# Patient Record
Sex: MALE | Race: WHITE | Employment: OTHER | URBAN - METROPOLITAN AREA
[De-identification: names, ages, dates, MRNs, and addresses within clinical notes are randomized per-mention and may not be internally consistent; named-entity substitution may affect disease eponyms.]

---

## 2021-03-27 ENCOUNTER — IMMUNIZATION (OUTPATIENT)
Dept: PRIMARY CARE CLINIC | Age: 51
End: 2021-03-27

## 2021-03-27 PROCEDURE — 0011A COVID-19, MODERNA VACCINE 100MCG/0.5ML DOSE: CPT | Performed by: PHYSICIAN ASSISTANT

## 2021-03-27 PROCEDURE — 91301 COVID-19, MODERNA VACCINE 100MCG/0.5ML DOSE: CPT | Performed by: PHYSICIAN ASSISTANT

## 2021-05-03 ENCOUNTER — IMMUNIZATION (OUTPATIENT)
Dept: PRIMARY CARE CLINIC | Age: 51
End: 2021-05-03

## 2021-05-03 PROCEDURE — 91301 COVID-19, MODERNA VACCINE 100MCG/0.5ML DOSE: CPT | Performed by: PHYSICIAN ASSISTANT

## 2021-05-03 PROCEDURE — 0012A COVID-19, MODERNA VACCINE 100MCG/0.5ML DOSE: CPT | Performed by: PHYSICIAN ASSISTANT

## 2022-04-26 ENCOUNTER — OFFICE VISIT (OUTPATIENT)
Dept: PRIMARY CARE CLINIC | Age: 52
End: 2022-04-26
Payer: COMMERCIAL

## 2022-04-26 VITALS
HEART RATE: 63 BPM | WEIGHT: 268 LBS | SYSTOLIC BLOOD PRESSURE: 122 MMHG | TEMPERATURE: 98.6 F | HEIGHT: 78 IN | DIASTOLIC BLOOD PRESSURE: 64 MMHG | OXYGEN SATURATION: 97 % | BODY MASS INDEX: 31.01 KG/M2

## 2022-04-26 DIAGNOSIS — F41.1 GAD (GENERALIZED ANXIETY DISORDER): ICD-10-CM

## 2022-04-26 DIAGNOSIS — Z12.11 SCREENING FOR COLON CANCER: ICD-10-CM

## 2022-04-26 DIAGNOSIS — E11.610 CHARCOT FOOT DUE TO DIABETES MELLITUS (HCC): Primary | ICD-10-CM

## 2022-04-26 DIAGNOSIS — E11.42 TYPE 2 DIABETES MELLITUS WITH DIABETIC POLYNEUROPATHY, WITH LONG-TERM CURRENT USE OF INSULIN (HCC): ICD-10-CM

## 2022-04-26 DIAGNOSIS — Z79.899 HIGH RISK MEDICATION USE: ICD-10-CM

## 2022-04-26 DIAGNOSIS — S98.112A AMPUTATION OF LEFT GREAT TOE (HCC): ICD-10-CM

## 2022-04-26 DIAGNOSIS — K64.4 HEMORRHOIDS, EXTERNAL WITHOUT COMPLICATIONS: ICD-10-CM

## 2022-04-26 DIAGNOSIS — Z79.4 TYPE 2 DIABETES MELLITUS WITH DIABETIC PERIPHERAL ANGIOPATHY WITHOUT GANGRENE, WITH LONG-TERM CURRENT USE OF INSULIN (HCC): ICD-10-CM

## 2022-04-26 DIAGNOSIS — Z86.31 PERSONAL HISTORY OF DIABETIC FOOT ULCER: ICD-10-CM

## 2022-04-26 DIAGNOSIS — Z79.4 TYPE 2 DIABETES MELLITUS WITH DIABETIC POLYNEUROPATHY, WITH LONG-TERM CURRENT USE OF INSULIN (HCC): ICD-10-CM

## 2022-04-26 DIAGNOSIS — R06.09 DOE (DYSPNEA ON EXERTION): ICD-10-CM

## 2022-04-26 DIAGNOSIS — Z12.5 SCREENING FOR PROSTATE CANCER: ICD-10-CM

## 2022-04-26 DIAGNOSIS — Z95.1 HISTORY OF FOUR VESSEL CORONARY ARTERY BYPASS GRAFT: ICD-10-CM

## 2022-04-26 DIAGNOSIS — E55.9 VITAMIN D DEFICIENCY: ICD-10-CM

## 2022-04-26 DIAGNOSIS — S98.131A AMPUTATION OF FIFTH TOE OF RIGHT FOOT (HCC): ICD-10-CM

## 2022-04-26 DIAGNOSIS — E11.51 TYPE 2 DIABETES MELLITUS WITH DIABETIC PERIPHERAL ANGIOPATHY WITHOUT GANGRENE, WITH LONG-TERM CURRENT USE OF INSULIN (HCC): ICD-10-CM

## 2022-04-26 DIAGNOSIS — N52.9 ERECTILE DYSFUNCTION, UNSPECIFIED ERECTILE DYSFUNCTION TYPE: ICD-10-CM

## 2022-04-26 PROBLEM — E11.621 TYPE 2 DIABETES MELLITUS WITH FOOT ULCER, WITH LONG-TERM CURRENT USE OF INSULIN (HCC): Status: ACTIVE | Noted: 2022-04-26

## 2022-04-26 PROBLEM — E11.621 TYPE 2 DIABETES MELLITUS WITH FOOT ULCER, WITH LONG-TERM CURRENT USE OF INSULIN (HCC): Status: RESOLVED | Noted: 2022-04-26 | Resolved: 2022-04-26

## 2022-04-26 PROBLEM — L97.509 TYPE 2 DIABETES MELLITUS WITH FOOT ULCER, WITH LONG-TERM CURRENT USE OF INSULIN (HCC): Status: RESOLVED | Noted: 2022-04-26 | Resolved: 2022-04-26

## 2022-04-26 PROBLEM — L97.509 TYPE 2 DIABETES MELLITUS WITH FOOT ULCER, WITH LONG-TERM CURRENT USE OF INSULIN (HCC): Status: ACTIVE | Noted: 2022-04-26

## 2022-04-26 LAB
ALBUMIN SERPL-MCNC: 5 G/DL (ref 3.5–5.2)
ALP BLD-CCNC: 96 U/L (ref 40–129)
ALT SERPL-CCNC: 26 U/L (ref 0–40)
ANION GAP SERPL CALCULATED.3IONS-SCNC: 16 MMOL/L (ref 7–16)
AST SERPL-CCNC: 22 U/L (ref 0–39)
BILIRUB SERPL-MCNC: 0.5 MG/DL (ref 0–1.2)
BUN BLDV-MCNC: 23 MG/DL (ref 6–20)
CALCIUM SERPL-MCNC: 10 MG/DL (ref 8.6–10.2)
CHLORIDE BLD-SCNC: 94 MMOL/L (ref 98–107)
CHOLESTEROL, TOTAL: 190 MG/DL (ref 0–199)
CO2: 24 MMOL/L (ref 22–29)
CREAT SERPL-MCNC: 1 MG/DL (ref 0.7–1.2)
CREATININE URINE: 31 MG/DL (ref 40–278)
GFR AFRICAN AMERICAN: >60
GFR NON-AFRICAN AMERICAN: >60 ML/MIN/1.73
GLUCOSE BLD-MCNC: 262 MG/DL (ref 74–99)
HBA1C MFR BLD: 10.2 %
HCT VFR BLD CALC: 46.7 % (ref 37–54)
HDLC SERPL-MCNC: 49 MG/DL
HEMOGLOBIN: 14.9 G/DL (ref 12.5–16.5)
LDL CHOLESTEROL CALCULATED: 114 MG/DL (ref 0–99)
MCH RBC QN AUTO: 27.8 PG (ref 26–35)
MCHC RBC AUTO-ENTMCNC: 31.9 % (ref 32–34.5)
MCV RBC AUTO: 87.1 FL (ref 80–99.9)
MICROALBUMIN UR-MCNC: <12 MG/L
MICROALBUMIN/CREAT UR-RTO: ABNORMAL (ref 0–30)
PDW BLD-RTO: 13.6 FL (ref 11.5–15)
PLATELET # BLD: 190 E9/L (ref 130–450)
PMV BLD AUTO: 10.8 FL (ref 7–12)
POTASSIUM SERPL-SCNC: 5 MMOL/L (ref 3.5–5)
PRO-BNP: 221 PG/ML (ref 0–125)
PROSTATE SPECIFIC ANTIGEN: 0.57 NG/ML (ref 0–4)
RBC # BLD: 5.36 E12/L (ref 3.8–5.8)
SODIUM BLD-SCNC: 134 MMOL/L (ref 132–146)
TOTAL PROTEIN: 8.4 G/DL (ref 6.4–8.3)
TRIGL SERPL-MCNC: 133 MG/DL (ref 0–149)
VITAMIN B-12: 492 PG/ML (ref 211–946)
VITAMIN D 25-HYDROXY: 44 NG/ML (ref 30–100)
VLDLC SERPL CALC-MCNC: 27 MG/DL
WBC # BLD: 4.2 E9/L (ref 4.5–11.5)

## 2022-04-26 PROCEDURE — G8427 DOCREV CUR MEDS BY ELIG CLIN: HCPCS | Performed by: FAMILY MEDICINE

## 2022-04-26 PROCEDURE — G8417 CALC BMI ABV UP PARAM F/U: HCPCS | Performed by: FAMILY MEDICINE

## 2022-04-26 PROCEDURE — 4004F PT TOBACCO SCREEN RCVD TLK: CPT | Performed by: FAMILY MEDICINE

## 2022-04-26 PROCEDURE — 83036 HEMOGLOBIN GLYCOSYLATED A1C: CPT | Performed by: FAMILY MEDICINE

## 2022-04-26 PROCEDURE — 3017F COLORECTAL CA SCREEN DOC REV: CPT | Performed by: FAMILY MEDICINE

## 2022-04-26 PROCEDURE — 2022F DILAT RTA XM EVC RTNOPTHY: CPT | Performed by: FAMILY MEDICINE

## 2022-04-26 PROCEDURE — 99204 OFFICE O/P NEW MOD 45 MIN: CPT | Performed by: FAMILY MEDICINE

## 2022-04-26 PROCEDURE — 3046F HEMOGLOBIN A1C LEVEL >9.0%: CPT | Performed by: FAMILY MEDICINE

## 2022-04-26 RX ORDER — LISINOPRIL 10 MG/1
TABLET ORAL
Qty: 90 TABLET | Refills: 1 | Status: SHIPPED
Start: 2022-04-26 | End: 2022-07-08 | Stop reason: SDUPTHER

## 2022-04-26 RX ORDER — EMPAGLIFLOZIN 25 MG/1
TABLET, FILM COATED ORAL
Qty: 90 TABLET | Refills: 1 | Status: SHIPPED
Start: 2022-04-26 | End: 2022-07-14 | Stop reason: SDUPTHER

## 2022-04-26 RX ORDER — FUROSEMIDE 20 MG/1
20 TABLET ORAL DAILY
Qty: 90 TABLET | Refills: 1 | Status: ON HOLD
Start: 2022-04-26 | End: 2022-08-05 | Stop reason: HOSPADM

## 2022-04-26 RX ORDER — MULTIVIT WITH MINERALS/LUTEIN
1000 TABLET ORAL DAILY
COMMUNITY

## 2022-04-26 RX ORDER — LISINOPRIL 10 MG/1
TABLET ORAL
COMMUNITY
Start: 2021-03-19 | End: 2022-04-26 | Stop reason: SDUPTHER

## 2022-04-26 RX ORDER — BUSPIRONE HYDROCHLORIDE 15 MG/1
TABLET ORAL
Qty: 180 TABLET | Refills: 1 | Status: SHIPPED
Start: 2022-04-26 | End: 2022-10-13 | Stop reason: SDUPTHER

## 2022-04-26 RX ORDER — SEMAGLUTIDE 1.34 MG/ML
INJECTION, SOLUTION SUBCUTANEOUS
COMMUNITY
Start: 2022-02-12 | End: 2022-04-26 | Stop reason: SDUPTHER

## 2022-04-26 RX ORDER — INSULIN GLARGINE 100 [IU]/ML
60 INJECTION, SOLUTION SUBCUTANEOUS DAILY
COMMUNITY
Start: 2021-01-28 | End: 2022-04-26 | Stop reason: SDUPTHER

## 2022-04-26 RX ORDER — CITALOPRAM 40 MG/1
TABLET ORAL
COMMUNITY
Start: 2022-02-10 | End: 2022-05-12 | Stop reason: ALTCHOICE

## 2022-04-26 RX ORDER — ASPIRIN 81 MG/1
162 TABLET ORAL DAILY
COMMUNITY

## 2022-04-26 RX ORDER — FUROSEMIDE 40 MG/1
TABLET ORAL
COMMUNITY
Start: 2022-02-10 | End: 2022-04-26 | Stop reason: SDUPTHER

## 2022-04-26 RX ORDER — METOPROLOL SUCCINATE 25 MG/1
TABLET, EXTENDED RELEASE ORAL
Qty: 90 TABLET | Refills: 1 | Status: SHIPPED
Start: 2022-04-26 | End: 2022-07-08 | Stop reason: SDUPTHER

## 2022-04-26 RX ORDER — BLOOD SUGAR DIAGNOSTIC
STRIP MISCELLANEOUS
Qty: 100 EACH | Refills: 1 | Status: SHIPPED
Start: 2022-04-26 | End: 2022-06-28

## 2022-04-26 RX ORDER — EMPAGLIFLOZIN 25 MG/1
TABLET, FILM COATED ORAL
COMMUNITY
Start: 2022-02-12 | End: 2022-04-26 | Stop reason: SDUPTHER

## 2022-04-26 RX ORDER — SEMAGLUTIDE 1.34 MG/ML
INJECTION, SOLUTION SUBCUTANEOUS
Qty: 4.5 ML | Refills: 1 | Status: SHIPPED
Start: 2022-04-26 | End: 2022-07-14 | Stop reason: SDUPTHER

## 2022-04-26 RX ORDER — GABAPENTIN 300 MG/1
300 CAPSULE ORAL 3 TIMES DAILY
Qty: 270 CAPSULE | Refills: 0 | Status: SHIPPED
Start: 2022-04-26 | End: 2022-10-13 | Stop reason: SDUPTHER

## 2022-04-26 RX ORDER — GABAPENTIN 300 MG/1
300 CAPSULE ORAL 3 TIMES DAILY
COMMUNITY
Start: 2022-04-22 | End: 2022-04-26 | Stop reason: SDUPTHER

## 2022-04-26 RX ORDER — BLOOD SUGAR DIAGNOSTIC
STRIP MISCELLANEOUS
COMMUNITY
Start: 2022-03-30

## 2022-04-26 RX ORDER — CITALOPRAM 10 MG/1
10 TABLET ORAL DAILY
Qty: 30 TABLET | Refills: 0 | Status: ON HOLD
Start: 2022-04-26 | End: 2022-08-03

## 2022-04-26 RX ORDER — INSULIN GLARGINE 100 [IU]/ML
60 INJECTION, SOLUTION SUBCUTANEOUS DAILY
Qty: 54 ML | Refills: 0
Start: 2022-04-26 | End: 2022-07-08 | Stop reason: SDUPTHER

## 2022-04-26 RX ORDER — TADALAFIL 20 MG/1
20 TABLET ORAL DAILY PRN
Qty: 30 TABLET | Refills: 1 | Status: SHIPPED | OUTPATIENT
Start: 2022-04-26

## 2022-04-26 RX ORDER — BUSPIRONE HYDROCHLORIDE 15 MG/1
TABLET ORAL
COMMUNITY
Start: 2022-04-07 | End: 2022-04-26 | Stop reason: SDUPTHER

## 2022-04-26 RX ORDER — METOPROLOL SUCCINATE 25 MG/1
TABLET, EXTENDED RELEASE ORAL
COMMUNITY
Start: 2022-02-10 | End: 2022-04-26 | Stop reason: SDUPTHER

## 2022-04-26 ASSESSMENT — PATIENT HEALTH QUESTIONNAIRE - PHQ9
2. FEELING DOWN, DEPRESSED OR HOPELESS: 0
1. LITTLE INTEREST OR PLEASURE IN DOING THINGS: 0
SUM OF ALL RESPONSES TO PHQ QUESTIONS 1-9: 0
SUM OF ALL RESPONSES TO PHQ9 QUESTIONS 1 & 2: 0
SUM OF ALL RESPONSES TO PHQ QUESTIONS 1-9: 0

## 2022-04-26 NOTE — PROGRESS NOTES
[facility]   1970    Chief Complaint:     Chief Complaint   Patient presents with   Sorto Establish Care     concern for poss hemorrhoids and weight loss     HPI   To establish  DM2  Taking medications as prescribed. Has not been compliant with recommended diet and exercise. Also taking ASA, ACEi/ARB. Not taking statin. Eye exam current (within one year): yes  Weight trend: stable  Ever attended diabetic education? yes  Compliance with diet:good   Compliance with medication: good   Home blood glucose monitoring? Frequent, CGM  Home blood sugar records: trend: stable  Any episodes of hypoglycemia? no  Proper foot care? yes  Diabetic complications: peripheral neuropathy, cardiovascular disease and peripheral vascular disease  Lab Results   Component Value Date    CHOL 190 2022    TRIG 133 2022    HDL 49 2022    LDLCALC 114 2022    LABVLDL 27 2022      Lab Results   Component Value Date/Time    MALBCR - (AA) 2022 12:00 PM   Hx amputation R 5th toe and L great toe. Follows w Podiatry and needs local referral. Charcot foot. Slow weight loss since dietary changes and starting SGLT2 and GLP1. No constitutional symptoms. Due for cancer screenings. Hx 4 vessel CABG. Needs current labs and to start back on statin. No CP, palps, tightness, orthopnea, PND. Has some exertional dyspnea. In reviewing Cardiology [facility] from [facility] he was to begin Cardiac Rehab on moving here a year ago. Having recent difficulty maintaining erections. Tried Viagra 100mg but no effect. Would likt to trial another medication. Reviewing meds, problem may have started since starting Celexa. Has lump lateral to anus. States bowel movements are normal. No bleeding. Due for colon cancer screening as well.      Need records from PCP, Endo, Podiatry, Cardiology    Past Medical History:   Diagnosis Date    Anxiety     Depression     Type 2 diabetes mellitus without complication ([facility] [address]        Past Surgical History:   Procedure Laterality Date    CORONARY ARTERY BYPASS GRAFT      EYE LID SURGERY      FOOT SURGERY Bilateral     OTHER SURGICAL HISTORY      atrial clip-has card in wallet    VEIN SURGERY         Social History     Socioeconomic History    Marital status:      Spouse name: None    Number of children: None    Years of education: None    Highest education level: None   Occupational History    None   Tobacco Use    Smoking status: Never Smoker    Smokeless tobacco: Current User     Types: Chew   Substance and Sexual Activity    Alcohol use: None    Drug use: None    Sexual activity: None   Other Topics Concern    None   Social History Narrative    None     Social Determinants of Health     Financial Resource Strain:     Difficulty of Paying Living Expenses: Not on file   Food Insecurity:     Worried About Running Out of Food in the Last Year: Not on file    Cindy of Food in the Last Year: Not on file   Transportation Needs:     Lack of Transportation (Medical): Not on file    Lack of Transportation (Non-Medical):  Not on file   Physical Activity:     Days of Exercise per Week: Not on file    Minutes of Exercise per Session: Not on file   Stress:     Feeling of Stress : Not on file   Social Connections:     Frequency of Communication with Friends and Family: Not on file    Frequency of Social Gatherings with Friends and Family: Not on file    Attends Muslim Services: Not on file    Active Member of Clubs or Organizations: Not on file    Attends Club or Organization Meetings: Not on file    Marital Status: Not on file   Intimate Partner Violence:     Fear of Current or Ex-Partner: Not on file    Emotionally Abused: Not on file    Physically Abused: Not on file    Sexually Abused: Not on file   Housing Stability:     Unable to Pay for Housing in the Last Year: Not on file    Number of [place] in the Last Year: Not on file    Unstable Housing in the Last Year: Not on file       Family History   Problem Relation Age of Onset    Diabetes Mother              Current Outpatient Medications   Medication Sig Dispense Refill    ONETOUCH VERIO strip USE 1 STRIP TO CHECK GLUCOSE TWICE DAILY      citalopram (CELEXA) 40 MG tablet TAKE 1 TABLET BY MOUTH ONCE DAILY FOR 90 DAYS      vitamin D3 (CHOLECALCIFEROL) 125 MCG (5000 UT) TABS tablet Take 5,000 Units by mouth daily      aspirin 81 MG EC tablet Take 162 mg by mouth daily      Ascorbic Acid (VITAMIN C) 1000 MG tablet Take 1,000 mg by mouth daily      tadalafil (CIALIS) 20 MG tablet Take 1 tablet by mouth daily as needed for Erectile Dysfunction 30 tablet 1    citalopram (CELEXA) 10 MG tablet Take 1 tablet by mouth daily . Taper to d/c 30 tablet 0    OZEMPIC, 0.25 OR 0.5 MG/DOSE, 2 MG/1.5ML SOPN INJECT 0.5MG SUBCUTANEOUSLY ONCE A WEEK 4.5 mL 1    metoprolol succinate (TOPROL XL) 25 MG extended release tablet TAKE 1 TABLET BY MOUTH ONCE DAILY FOR 90 DAYS 90 tablet 1    lisinopril (PRINIVIL;ZESTRIL) 10 MG tablet Take one tablet daily . 90 tablet 1    insulin glargine (LANTUS SOLOSTAR) 100 UNIT/ML injection pen Inject 60 Units into the skin daily 54 mL 0    gabapentin (NEURONTIN) 300 MG capsule Take 1 capsule by mouth 3 times daily for 90 days. 270 capsule 0    furosemide (LASIX) 20 MG tablet Take 1 tablet by mouth daily 90 tablet 1    empagliflozin (JARDIANCE) 25 MG tablet TAKE 1 TABLET BY MOUTH ONCE DAILY FOR 90 DAYS 90 tablet 1    busPIRone (BUSPAR) 15 MG tablet TAKE 1 TABLET BY MOUTH TWICE DAILY 180 tablet 1    Insulin Pen Needle (PEN NEEDLES) 32G X 5 MM MISC Use daily w insulin pens 100 each 1    rosuvastatin (CRESTOR) 20 MG tablet Take 1 tablet by mouth nightly 30 tablet 5     No current facility-administered medications for this visit.        No Known Allergies    REVIEW OF SYSTEMS  Review of Systems   Constitutional: Negative for activity change, appetite change, chills, diaphoresis, fatigue and fever.   Eyes: Negative for redness and visual disturbance. Respiratory: Positive for shortness of breath (exertional alone). Negative for cough, chest tightness and wheezing. Cardiovascular: Negative for chest pain, palpitations and leg swelling. Gastrointestinal: Negative for abdominal pain, anal bleeding, blood in stool, constipation, diarrhea, nausea, rectal pain and vomiting. Endocrine: Negative for cold intolerance, heat intolerance, polydipsia, polyphagia and polyuria. Skin: Negative for color change, pallor and rash. Neurological: Negative for dizziness, syncope, weakness, numbness and headaches. Psychiatric/Behavioral: Negative for confusion, dysphoric mood and sleep disturbance. The patient is not nervous/anxious. All other systems reviewed and are negative. PHYSICAL EXAM  /64   Pulse 63   Temp 98.6 °F (37 °C) (Temporal)   Ht 6' 7\" (2.007 m)   Wt 268 lb (121.6 kg)   SpO2 97%   BMI 30.19 kg/m²   Physical Exam  Vitals reviewed. Constitutional:       General: He is not in acute distress. Appearance: He is well-developed. HENT:      Right Ear: Tympanic membrane, ear canal and external ear normal.      Left Ear: Tympanic membrane, ear canal and external ear normal.      Nose: Nose normal.      Mouth/Throat:      Mouth: Mucous membranes are moist.      Pharynx: Oropharynx is clear. No oropharyngeal exudate or posterior oropharyngeal erythema. Eyes:      General: No scleral icterus. Conjunctiva/sclera: Conjunctivae normal.   Neck:      Thyroid: No thyromegaly. Vascular: No JVD. Cardiovascular:      Rate and Rhythm: Normal rate and regular rhythm. Heart sounds: Normal heart sounds. No murmur heard. No friction rub. No gallop. Pulmonary:      Effort: Pulmonary effort is normal. No respiratory distress. Breath sounds: Normal breath sounds. No stridor. No wheezing, rhonchi or rales.    Abdominal:      General: Bowel sounds are normal. There is no distension. Palpations: Abdomen is soft. Tenderness: There is no abdominal tenderness. Genitourinary:     Comments: Small, left sided external hemorrhoid  Musculoskeletal:      Cervical back: Neck supple. Lymphadenopathy:      Cervical: No cervical adenopathy. Skin:     General: Skin is warm and dry. Coloration: Skin is not pale. Findings: No erythema or rash. Neurological:      Mental Status: He is alert and oriented to person, place, and time. Psychiatric:         Behavior: Behavior normal.         Thought Content: Thought content normal.         Judgment: Judgment normal.         ASSESSMENT/PLAN:     Diagnosis Orders   1. Charcot foot due to diabetes mellitus [facility])  Andi Kayser, MD, Endocrinology, [address] [address], Raimundo Alegria DPM, Podiatry, [place]   2. Personal history of diabetic foot ulcer  Andi Kayser, MD, Endocrinology, [address] [address], Raimundo Alegria DPM, Podiatry, [place]   3. History of four vessel coronary artery bypass graft  aspirin 81 MG EC tablet    Kendell Gr MD, Cardiology, [place]    metoprolol succinate (TOPROL XL) 25 MG extended release tablet   4. HERNANDEZ (dyspnea on exertion)  Brain Natriuretic Peptide   5. High risk medication use  Vitamin B12   6. Type 2 diabetes mellitus with diabetic polyneuropathy, with long-term current use of insulin ([facility] [address]  Hu Fraga MD, Endocrinology, [place]    CBC    Comprehensive Metabolic Panel    Lipid Panel    Microalbumin / Creatinine Urine Ratio    POCT glycosylated hemoglobin (Hb A1C)    OZEMPIC, 0.25 OR 0.5 MG/DOSE, 2 MG/1.5ML SOPN    lisinopril (PRINIVIL;ZESTRIL) 10 MG tablet    insulin glargine (LANTUS SOLOSTAR) 100 UNIT/ML injection pen    empagliflozin (JARDIANCE) 25 MG tablet    Insulin Pen Needle (PEN NEEDLES) 32G X 5 MM MISC   7. Hemorrhoids, external without complications  Bebe Kasper MD, General Surgery, [facility]   8.  Screening for colon cancer  Guerrero Shah MD, General Surgery, [place]   9. Amputation of fifth toe of right foot [facility])  Keaton Pemberton MD, Endocrinology, [address], Carie Ross DPM, Podiatry, [place]   10. Amputation of left great toe [facility])  Keaton Pemberton MD, Endocrinology, [address], Carie Ross DPM, Podiatry, [place]   11. Vitamin D deficiency  vitamin D3 (CHOLECALCIFEROL) 125 MCG (5000 UT) TABS tablet    Vitamin D 25 Hydroxy   12. Screening for prostate cancer  PSA Screening   13. Type 2 diabetes mellitus with diabetic peripheral angiopathy without gangrene, with long-term current use of insulin (HCC)  gabapentin (NEURONTIN) 300 MG capsule   14. Erectile dysfunction, unspecified erectile dysfunction type  tadalafil (CIALIS) 20 MG tablet   15. RAQUEL (generalized anxiety disorder)  citalopram (CELEXA) 10 MG tablet    busPIRone (BUSPAR) 15 MG tablet     Orders and referrals as above. Continue same meds and restart statin. Labs to establish baseline. Records releases. Will order Cardiac Rehab. Taper off of Celexa and trial buspar, see if ED resolves off of Celexa. Reviewed age and gender appropriate health screening exams and vaccinations. Advised patient regardingimportance of keeping up with recommended health maintenance and to schedule as soon as possible if overdue, as this is important in assessing for undiagnosed pathology, especially cancer. Patient verbalizes understandingand agrees. Call or go to ED immediately if symptoms worsen or persist.  Return in about 6 weeks (around 6/7/2022). Sooner if necessary. Counseled regarding above diagnosis, including possible risks and complications,  especially if leftuncontrolled. Counseled regarding the possible side effects, risks, benefits and alternatives to treatment; patient and/or guardian verbalizes understanding. Advised patient to call with any new medication issues.  Allquestions answered.     Future Appointments   Date Time Provider Berta Steinbergi   5/13/2022  8:30 AM Jannie Negro MD [facility]   6/6/2022  3:45 PM [facility]   6/20/2022 10:00 AM Abdulaziz Bowles MD [facility]       Yovanny Meehan MD  4/26/2022

## 2022-05-03 ENCOUNTER — TELEPHONE (OUTPATIENT)
Dept: PRIMARY CARE CLINIC | Age: 52
End: 2022-05-03

## 2022-05-03 DIAGNOSIS — Z95.1 HISTORY OF FOUR VESSEL CORONARY ARTERY BYPASS GRAFT: Primary | ICD-10-CM

## 2022-05-03 DIAGNOSIS — R68.89 EXERCISE INTOLERANCE: ICD-10-CM

## 2022-05-03 DIAGNOSIS — R06.02 SOB (SHORTNESS OF BREATH): ICD-10-CM

## 2022-05-03 RX ORDER — ROSUVASTATIN CALCIUM 20 MG/1
20 TABLET, COATED ORAL NIGHTLY
Qty: 30 TABLET | Refills: 5 | Status: SHIPPED
Start: 2022-05-03 | End: 2022-05-13

## 2022-05-04 NOTE — TELEPHONE ENCOUNTER
Crestor sent to [facility]. Order placed for Cardiac Rehab. Per notes, he never completed on moving here.

## 2022-05-04 NOTE — TELEPHONE ENCOUNTER
----- Message from Arno Klinefelter, [place] sent at 4/29/2022  3:19 PM EDT -----  Spoke with patient regarding results. He has been on Crestor before which helped him. He would like rx sent to [facility]. He has never had cardiac rehab and does not have an exercise regiment.

## 2022-05-07 PROBLEM — N52.9 ERECTILE DYSFUNCTION: Status: ACTIVE | Noted: 2022-05-07

## 2022-05-07 PROBLEM — F41.1 GAD (GENERALIZED ANXIETY DISORDER): Status: ACTIVE | Noted: 2022-05-07

## 2022-05-07 ASSESSMENT — ENCOUNTER SYMPTOMS
RECTAL PAIN: 0
VOMITING: 0
ABDOMINAL PAIN: 0
BLOOD IN STOOL: 0
COUGH: 0
CONSTIPATION: 0
COLOR CHANGE: 0
WHEEZING: 0
ANAL BLEEDING: 0
SHORTNESS OF BREATH: 1
CHEST TIGHTNESS: 0
DIARRHEA: 0
NAUSEA: 0
EYE REDNESS: 0

## 2022-05-12 ENCOUNTER — HOSPITAL ENCOUNTER (OUTPATIENT)
Dept: CARDIAC REHAB | Age: 52
Setting detail: THERAPIES SERIES
Discharge: HOME | End: 2022-05-12
Payer: COMMERCIAL

## 2022-05-12 VITALS
HEART RATE: 70 BPM | WEIGHT: 277.4 LBS | OXYGEN SATURATION: 100 % | HEIGHT: 78 IN | RESPIRATION RATE: 20 BRPM | BODY MASS INDEX: 32.1 KG/M2

## 2022-05-12 PROCEDURE — 93798 PHYS/QHP OP CAR RHAB W/ECG: CPT

## 2022-05-12 PROCEDURE — 93797 PHYS/QHP OP CAR RHAB WO ECG: CPT

## 2022-05-12 ASSESSMENT — PATIENT HEALTH QUESTIONNAIRE - PHQ9
SUM OF ALL RESPONSES TO PHQ9 QUESTIONS 1 & 2: 0
SUM OF ALL RESPONSES TO PHQ QUESTIONS 1-9: 0
1. LITTLE INTEREST OR PLEASURE IN DOING THINGS: 0
2. FEELING DOWN, DEPRESSED OR HOPELESS: 0
SUM OF ALL RESPONSES TO PHQ QUESTIONS 1-9: 0

## 2022-05-12 ASSESSMENT — EJECTION FRACTION: EF_VALUE: 45

## 2022-05-12 ASSESSMENT — EXERCISE STRESS TEST
PEAK_BP: 140/62
PEAK_HR: 94
PEAK_BP: 140/62
PEAK_RPE: 13

## 2022-05-12 ASSESSMENT — LIFESTYLE VARIABLES: SMOKELESS_TOBACCO: YES

## 2022-05-12 ASSESSMENT — NEW YORK HEART ASSOCIATION (NYHA) CLASSIFICATION: NYHA FUNCTIONAL CLASS: NO NYHA CLASS OR UNABLE TO DETERMINE

## 2022-05-12 NOTE — CARDIO/PULMONARY
PT. S/P CABG X4 FROM 12-. PT. PT IS A DIABETIC TYPE II. HE HAS A HX OF CHARCOT FOOT AND HAS HAD AN AMPUTATION OF HIS RIGHTT 5TH TOE AND LEFT GREAT TOE. PT STATES HE GETS SOB ON EXERTION. HE HAS A HX OF ANXIETY AND DEPRESSION WHICH IS CONTROLLED WITH MEDICATION, PHQ 9 SCORE IS A 0. HE IS A HIGH [place] RISK. 5/8. PT WEARS A BRACE TO HIS RIGHT FOOT WHEN STANDING AND WALKING FOR LONG PERIODS OF TIME. PT CHEWS TOBACCO AND IS TRYING TO QUIT ON HIS OWN.

## 2022-05-12 NOTE — PROGRESS NOTES
05/12/22 0934   Treatment Diagnosis   Treatment Diagnosis 1 CABG   CABG Date 12/18/20   Referral Date 05/03/22   Co-morbidities Diabetes   Oxygen Intervention   Oxygen Use No   Individual Treatment Plan   ITP Visit Type Initial assessment   1st Date of Exercise  05/12/22   Visit #/Total Visits 2/36   EF% 45 %   Risk Stratification Moderate   ITP Exercise;Psychosocial;Tobacco;Nutrition;Education   Exercise    Stages of Change Action   Assisted Devices Other (comment)  (PT WEARS A FOOT BRACE . ... [place] BRACE ON RIGHT FOOT)   Test Exercise tolerance test  (Air assault bike)   Data Measured Before Walk   Heart Rate 70   Blood Pressure 109/67   O2 Saturation 100   O2 Device Room air   RPE 6   Data Measured during Walk   Indicate Mode of RPE 6 minutes   RPE Data Measured During   Bike Speed 160 mph   Symptoms   (none)   Any problems while exercising no   Do You Have Shortness of Breath No   Describe Type of Pain You Are Having none   Peak RPE 13   NYHA Heart Failure Classification No NYHA class or unable to determine   Data Measured Immediately After Walk   Peak Heart Rate 94   Peak Blood Pressure 140/62   RPE 13   O2 Saturation 99   Data Measured at 5 Minutes After Walk   Heart Rate 61   Blood Pressure 112/64   SpO2 100 %   Comments No issues. Unable to walk d/t Charcot syndrome. Air assault bike used for 6 minute test without difficulty. [address] [facility]. speed 160, RPM 40, 1.3 miles achieved. Exercise Prescription   Mode Bike;Ergometer; Other (comment)  (Nustep)   Frequency per week 3   Duration Per Session 31-60   Intensity METS       1.4-4.1   RPE 11-13   Progression Work up to 45-60 minutes of aerobic exercise. Work up to 3-5 METs and 12-14 RPE.    Symptoms with Exercise   (none)   Target Heart Rate   (UNM equation with 5% taken out for beta blocker)   Resistance Training Yes   Exercise Blood Pressures   Resting /67   Peak /62   Is BP WDL Yes   Exercise Activity at Home   Type non weight bearing Frequency 2x per week   Duration 20+minutes. Resistance Training Yes  (After we start here)   Exercise Education   Education Exercise safety;Signs/symptoms to report;RPE scale;Equipment orientation; Warm up/cool down;Physically active   Exercise Target Goal   Target Goal(s) Individual exercise RX; Aerobic activity 30 + minutes/day  5 days/week   Patient Stated Exercise Goals To get back to normal activities w/out SOB, to stay in shape and to start strength training   Psychosocial   Stages of Change Contemplate; Action   Psychosocial Intervention   Interventions Currently under treatment for depression   Consults PCP  (PT UNDER CARE OF PCP FOR ANXIETY AND DEPRESSION)   Resources Provided   (NONE)   Currently Taking Psychotropic Meds Yes   Medication Changes No   Psychosocial Education   Education Cardiac meds; Coping techniques;Relaxation techniques;Signs/symptoms of depression   Psychosocial Target Goals   Target Goal(s) Assess presence or absence of depression using a valid screening tool   Uses Stress Mgmt Techniques No   Patient Stated Psychosocial Goals CONTINUE TO HAVE ANXIETY AND DEPRESION UNDER CONTROL   Tobacco   Stages of Change Contemplate   Tobacco Use Yes   Date Started 05/12/10   Smokeless Tobacco Use Yes   Amount 1 CAN/DAY   Tobacco Cessation Intervention   Smoking Cessation Referral No  (PT IS TRYING TO QUIT ON HIS OWN)   Tobacco Education   Resource Information Provided No   Target Goal   Target Goal Complete cessation of tobacco use   Patient Stated Tobacco Goals PT STATED HE IS TRYING TO QUIT ON HIS OWN   Nutrition   Stages of Change Contemplate; Action   Diabetes Yes      FBS Date 05/12/22   HbA1C 10.2   HbA1C Date 04/26/22   BG at Home Yes   Diabetes Med(s) OZEMPIC,LANTUS, JARDIANCE   Diabetes Med(s) Change No   BG in Range Yes  (PT STATES 120-150)   BG Frequency TWICE A DAY  (TWICE A  DAY)   Lipids   Date Lipids Drawn 04/26/22   Total 190   HDL 49      Triglycerides 133   Lipid Med(s) CRESTOR   Lipid Med Change(s) No   Weight Management   Weight  277 lb 6.4 oz (125.8 kg)   Height  6' 7\" (2.007 m)   BMI 31.32   Alcohol None   Nutrition Intervention   Dietitian Consult Yes  (1;1 sceduled for Monday June 6th, 9am)   Nurse/Patient Discussion Yes   Diabetes Education Referral No   Lipid Clinic Referral No   Nutrition Education   Education Signs/symptoms/treatment of hypo/hyperglycemia;DM & CAD relationship;Low saturated fat diet;Limit added sugars; Overall healthy eating pattern that emphasizes vegetables, fruits, wholegrains, healthy proteins and non-tropical oils   Nutrition Target Goals   Target Goals LDL-C less than 70 and non-HDL-C <100 for those with ASCVD;HbA1C less than 7 percent for those with diabetes   Patient Stated Nutrition Goals LOWER HGBA1C   Education   Learning Barrier Ready to learn   Knowledge Test Score 8/10   Education Intervention   Education Schedule Given Yes   Patient Education    Education CAD;Risk factors;Med compliance;Cardiac A&P;Signs/Symptoms of angina; Sexuality   Hypertension Yes   Hypertension Controlled Yes   Is BP WDL Yes   Med(s) Change No   BP Meds LISINOPRIL, METOPROLOL   ACE/ARB Prescribed Yes   ACE/ARB Adherent Yes   ASA Prescribed Yes   ASA Adherent Yes   BB Prescribed Yes   BB Adherent Yes   Statins Prescribed Yes  (CRESTOR)   Statins Adherent Yes   Education Target Goals   Target Goals Medication compliance; Risk factors; Understand target guidelines for lipids; Understand target guidelines for B/P   Patient Stated Education Goals He want s learn how control his diabetes and to learn ways quit chewing tobacco   Treatment Goals   Goals Exercise; Tobacco;Education   Exercise Goal Work up to 60 minutes of aerobic exercise  and moderate intensityby end of program   Exercise Goal Status Initial   Exercise Goal Assessment Frequency/duration   Tobacco Goal To stop chewing tobacco   Tobacco Goal Status Initial   Tobacco Goal Comments Teach ways/methods of stopping cewing tobacco   Tobacco Goal Assessment Recommendations   Education Goal Improve cardiac knowledge, control diabetes and chewing tobacco cessation   Education Goal Status Initial   Education Goal Assessment Recommendations

## 2022-05-13 ENCOUNTER — TELEPHONE (OUTPATIENT)
Dept: VASCULAR SURGERY | Age: 52
End: 2022-05-13

## 2022-05-13 ENCOUNTER — OFFICE VISIT (OUTPATIENT)
Dept: CARDIOLOGY CLINIC | Age: 52
End: 2022-05-13
Payer: COMMERCIAL

## 2022-05-13 VITALS
HEIGHT: 78 IN | OXYGEN SATURATION: 98 % | WEIGHT: 268 LBS | DIASTOLIC BLOOD PRESSURE: 62 MMHG | HEART RATE: 74 BPM | SYSTOLIC BLOOD PRESSURE: 126 MMHG | RESPIRATION RATE: 20 BRPM | BODY MASS INDEX: 31.01 KG/M2

## 2022-05-13 DIAGNOSIS — I83.92 VARICOSE VEINS OF LEFT LOWER EXTREMITY, UNSPECIFIED WHETHER COMPLICATED: Primary | ICD-10-CM

## 2022-05-13 DIAGNOSIS — Z95.1 HISTORY OF FOUR VESSEL CORONARY ARTERY BYPASS GRAFT: ICD-10-CM

## 2022-05-13 DIAGNOSIS — I25.5 ISCHEMIC CARDIOMYOPATHY: ICD-10-CM

## 2022-05-13 DIAGNOSIS — I25.10 CORONARY ARTERY DISEASE INVOLVING NATIVE CORONARY ARTERY OF NATIVE HEART WITHOUT ANGINA PECTORIS: Primary | ICD-10-CM

## 2022-05-13 PROCEDURE — G8417 CALC BMI ABV UP PARAM F/U: HCPCS | Performed by: INTERNAL MEDICINE

## 2022-05-13 PROCEDURE — 3017F COLORECTAL CA SCREEN DOC REV: CPT | Performed by: INTERNAL MEDICINE

## 2022-05-13 PROCEDURE — 93000 ELECTROCARDIOGRAM COMPLETE: CPT | Performed by: INTERNAL MEDICINE

## 2022-05-13 PROCEDURE — G8427 DOCREV CUR MEDS BY ELIG CLIN: HCPCS | Performed by: INTERNAL MEDICINE

## 2022-05-13 PROCEDURE — 99203 OFFICE O/P NEW LOW 30 MIN: CPT | Performed by: INTERNAL MEDICINE

## 2022-05-13 PROCEDURE — 4004F PT TOBACCO SCREEN RCVD TLK: CPT | Performed by: INTERNAL MEDICINE

## 2022-05-13 RX ORDER — ROSUVASTATIN CALCIUM 40 MG/1
40 TABLET, COATED ORAL NIGHTLY
Qty: 30 TABLET | Refills: 5 | Status: SHIPPED
Start: 2022-05-13 | End: 2022-10-13 | Stop reason: SDUPTHER

## 2022-05-13 ASSESSMENT — ENCOUNTER SYMPTOMS
WHEEZING: 0
NAUSEA: 0
BACK PAIN: 0
CONSTIPATION: 0
SHORTNESS OF BREATH: 1
VOMITING: 0
DIARRHEA: 1
COUGH: 0
BLOOD IN STOOL: 0
ABDOMINAL PAIN: 0

## 2022-05-13 NOTE — TELEPHONE ENCOUNTER
Received referral from Dr. Brinda Chao for varicose veins, left message for patient to schedule appointment with Dr. Henrik Perera.

## 2022-05-13 NOTE — PROGRESS NOTES
OUTPATIENT CARDIOLOGY CONSULT    Name: Gricelda Christensen    Age: 46 y.o. Date of Service: 5/13/2022    Reason for Consultation:   Chief Complaint   Patient presents with    Coronary Artery Disease     Consult, per Dr Kaitlyn Perera, for cardiac eval. Seen by cardio in [place]; records in [address] [place] [address] Rd. Labs 4/26/22.  Atrial Fibrillation    Cardiomyopathy    Hypertension    Hyperlipidemia    Diabetes    Chronic Kidney Disease        Referring Physician: Raquel Grant MD    History of Present Illness:  51-year-old morbidly obese male who is referred for cardiac evaluation due to  He has history of hypertension, hyperlipidemia, diabetes, peripheral neuropathy, CAD, status post four-vessel CABG with LIMA to LAD, SVG to first diagonal, SVG to second obtuse marginal branch, and SVG to PDA and Atriclip in December 8, 2020 at [facility] in [place], post op atrial fibrillation, ischemic cardiomyopathy with an ejection fraction of 25 to 30% prior to surgery and 45+25% post surgery, chronic kidney disease, diabetic foot ulcer, status post left great toe amputation, erectile dysfunction, vitamin D deficiency, anxiety and depression. Patient has not been active. He denies smoking. He started cardiac rehab yesterday. He feels dyspnea on exertion at times. He feels little chest pain during strenuous exercise. He feels occasional dizziness but denies syncope. He denies orthopnea, PND or lower extremity edema. He denies palpitations. EKG done today revealed sinus rhythm at 74 bpm, left atrial enlargement, low voltage in frontal leads, PVC, interventricular conduction delay and nonspecific T wave changes. Review of Systems:  Review of Systems   Constitutional: Negative for chills, fatigue and fever. HENT: Negative for nosebleeds. Respiratory: Positive for shortness of breath. Negative for cough and wheezing. Cardiovascular: Positive for chest pain. Gastrointestinal: Positive for diarrhea.  Negative for abdominal pain, blood in stool, constipation, nausea and vomiting. Genitourinary: Negative for dysuria and hematuria. Musculoskeletal: Negative for back pain, joint swelling and myalgias. Right shoulder aching. Neurological: Negative for syncope and light-headedness. Psychiatric/Behavioral: The patient is not nervous/anxious. Past Medical History:  Past Medical History:   Diagnosis Date    Anxiety     CAD (coronary artery disease)     Depression     Hyperlipidemia     Type 2 diabetes mellitus without complication (HCC)        Past Surgical History:  Past Surgical History:   Procedure Laterality Date    CARDIAC SURGERY      CORONARY ARTERY BYPASS GRAFT      EYE LID SURGERY      FOOT SURGERY Bilateral     FRACTURE SURGERY      OTHER SURGICAL HISTORY      atrial clip-has card in wallet    VEIN SURGERY         Family History:  Family History   Problem Relation Age of Onset    Diabetes Mother     Coronary Art Dis Father        Social History:  Social History     Socioeconomic History    Marital status: Legally      Spouse name: Not on file    Number of children: Not on file    Years of education: Not on file    Highest education level: Not on file   Occupational History    Not on file   Tobacco Use    Smoking status: Never Smoker    Smokeless tobacco: Current User     Types: Chew   Substance and Sexual Activity    Alcohol use: Not Currently    Drug use: Not on file    Sexual activity: Not on file   Other Topics Concern    Not on file   Social History Narrative    Not on file     Social Determinants of Health     Financial Resource Strain:     Difficulty of Paying Living Expenses: Not on file   Food Insecurity:     Worried About Running Out of Food in the Last Year: Not on file    Cindy of Food in the Last Year: Not on file   Transportation Needs:     Lack of Transportation (Medical): Not on file    Lack of Transportation (Non-Medical):  Not on file Physical Activity:     Days of Exercise per Week: Not on file    Minutes of Exercise per Session: Not on file   Stress:     Feeling of Stress : Not on file   Social Connections:     Frequency of Communication with Friends and Family: Not on file    Frequency of Social Gatherings with Friends and Family: Not on file    Attends Cheondoism Services: Not on file    Active Member of [address] [place] [address] or Organizations: Not on file    Attends Club or Organization Meetings: Not on file    Marital Status: Not on file   Intimate Partner Violence:     Fear of Current or Ex-Partner: Not on file    Emotionally Abused: Not on file    Physically Abused: Not on file    Sexually Abused: Not on file   Housing Stability:     Unable to Pay for Housing in the Last Year: Not on file    Number of [place] in the Last Year: Not on file    Unstable Housing in the Last Year: Not on file       Allergies:  No Known Allergies    Current Medications:  Current Outpatient Medications   Medication Sig Dispense Refill    rosuvastatin (CRESTOR) 20 MG tablet Take 1 tablet by mouth nightly 30 tablet 5    ONETOUCH VERIO strip USE 1 STRIP TO CHECK GLUCOSE TWICE DAILY      vitamin D3 (CHOLECALCIFEROL) 125 MCG (5000 UT) TABS tablet Take 5,000 Units by mouth daily      aspirin 81 MG EC tablet Take 162 mg by mouth daily      Ascorbic Acid (VITAMIN C) 1000 MG tablet Take 1,000 mg by mouth daily      tadalafil (CIALIS) 20 MG tablet Take 1 tablet by mouth daily as needed for Erectile Dysfunction 30 tablet 1    citalopram (CELEXA) 10 MG tablet Take 1 tablet by mouth daily . Taper to d/c 30 tablet 0    OZEMPIC, 0.25 OR 0.5 MG/DOSE, 2 MG/1.5ML SOPN INJECT 0.5MG SUBCUTANEOUSLY ONCE A WEEK 4.5 mL 1    metoprolol succinate (TOPROL XL) 25 MG extended release tablet TAKE 1 TABLET BY MOUTH ONCE DAILY FOR 90 DAYS 90 tablet 1    lisinopril (PRINIVIL;ZESTRIL) 10 MG tablet Take one tablet daily .  90 tablet 1    insulin glargine (LANTUS SOLOSTAR) 100 UNIT/ML injection pen Inject 60 Units into the skin daily 54 mL 0    gabapentin (NEURONTIN) 300 MG capsule Take 1 capsule by mouth 3 times daily for 90 days. 270 capsule 0    furosemide (LASIX) 20 MG tablet Take 1 tablet by mouth daily 90 tablet 1    empagliflozin (JARDIANCE) 25 MG tablet TAKE 1 TABLET BY MOUTH ONCE DAILY FOR 90 DAYS 90 tablet 1    busPIRone (BUSPAR) 15 MG tablet TAKE 1 TABLET BY MOUTH TWICE DAILY 180 tablet 1    Insulin Pen Needle (PEN NEEDLES) 32G X 5 MM MISC Use daily w insulin pens 100 each 1     No current facility-administered medications for this visit. Physical Exam:  There were no vitals taken for this visit. Wt Readings from Last 3 Encounters:   05/12/22 277 lb 6.4 oz (125.8 kg)   04/26/22 268 lb (121.6 kg)     Physical Exam:  There were no vitals taken for this visit. Wt Readings from Last 3 Encounters:   05/12/22 277 lb 6.4 oz (125.8 kg)   04/26/22 268 lb (121.6 kg)     Physical Exam  Constitutional:       General: He is not in acute distress. Appearance: He is well-developed. HENT:      Head: Normocephalic and atraumatic. Neck:      Vascular: No carotid bruit or JVD. Cardiovascular:      Rate and Rhythm: Normal rate and regular rhythm. Heart sounds: No murmur heard. No friction rub. No gallop. Comments: Distant heart sounds. Pulmonary:      Breath sounds: No wheezing or rales. Comments: Fair air entry with no wheezing or crackles. Chest:      Chest wall: No tenderness. Abdominal:      General: Bowel sounds are normal. There is no distension. Palpations: Abdomen is soft. There is no mass. Tenderness: There is no abdominal tenderness. Comments: No abdominal bruit. Musculoskeletal:      Cervical back: Neck supple. Right lower leg: No edema. Left lower leg: No edema. Comments: Varicose veins of the lower extremities was noted. Skin:     General: Skin is warm and dry.    Neurological:      Mental Status: He is alert and oriented to person, place, and time. Laboratory Tests:  Lab Results   Component Value Date    CREATININE 1.0 04/26/2022    BUN 23 (H) 04/26/2022     04/26/2022    K 5.0 04/26/2022    CL 94 (L) 04/26/2022    CO2 24 04/26/2022       Lab Results   Component Value Date    WBC 4.2 (L) 04/26/2022    HGB 14.9 04/26/2022    HCT 46.7 04/26/2022    MCV 87.1 04/26/2022     04/26/2022     Lab Results   Component Value Date    ALT 26 04/26/2022    AST 22 04/26/2022    ALKPHOS 96 04/26/2022    BILITOT 0.5 04/26/2022         Lab Results   Component Value Date    CHOL 190 04/26/2022     Lab Results   Component Value Date    TRIG 133 04/26/2022     Lab Results   Component Value Date    HDL 49 04/26/2022     Lab Results   Component Value Date    LDLCALC 114 (H) 04/26/2022     Lab Results   Component Value Date    LABVLDL 27 04/26/2022             ASSESSMENT / PLAN:  -CAD: Status post four-vessel CABG: Stable with no angina.  -Ischemic cardiomyopathy with mild systolic dysfunction: Clinically compensated.  -Paroxysmal atrial fibrillation post CABG, status post Atriclip. He is in sinus rhythm.  -Hyperlipidemia: LDL is not at target. -Diabetes: Not controlled with last A1c 10.2.  -Hypertension: Controlled. -Chronic kidney disease.  -Peripheral arterial disease.  -Peripheral neuropathy.  -Erectile dysfunction.  -Varicose veins of the lower extremities.  -History of vitamin D deficiency. -Anxiety and depression.  -Morbid obesity. Patient was advised to continue cardiac rehab. We will continue current cardiac medications. Will increase his Crestor to 40 mg daily and recheck his fasting lipid profile and liver function test in 2 months. We will arrange for the patient to have an echocardiogram to reassess ejection fraction. Will refer for vascular consultation due to varicose veins of the lower extremities. Will follow-up at the office in 1 year.         Thank you for allowing me to participate in your patient's care. Please feel free to contact me if you have any questions or concerns.     Mariam Amanda MD [facility] [place], [address] [facility]

## 2022-05-16 ENCOUNTER — HOSPITAL ENCOUNTER (OUTPATIENT)
Dept: CARDIAC REHAB | Age: 52
Setting detail: THERAPIES SERIES
Discharge: HOME | End: 2022-05-16
Payer: COMMERCIAL

## 2022-05-16 PROCEDURE — 93798 PHYS/QHP OP CAR RHAB W/ECG: CPT

## 2022-05-18 ENCOUNTER — HOSPITAL ENCOUNTER (OUTPATIENT)
Dept: CARDIAC REHAB | Age: 52
Setting detail: THERAPIES SERIES
Discharge: HOME | End: 2022-05-18
Payer: COMMERCIAL

## 2022-05-22 ENCOUNTER — PATIENT MESSAGE (OUTPATIENT)
Dept: PRIMARY CARE CLINIC | Age: 52
End: 2022-05-22

## 2022-05-22 DIAGNOSIS — F41.1 GAD (GENERALIZED ANXIETY DISORDER): ICD-10-CM

## 2022-05-23 ENCOUNTER — APPOINTMENT (OUTPATIENT)
Dept: CARDIAC REHAB | Age: 52
End: 2022-05-23
Payer: COMMERCIAL

## 2022-05-23 ENCOUNTER — TELEPHONE (OUTPATIENT)
Dept: PRIMARY CARE CLINIC | Age: 52
End: 2022-05-23

## 2022-05-23 NOTE — TELEPHONE ENCOUNTER
Patient would like doctor to call him because he is having mood swings and is irritable and would like to go back to cardiac rehab. Would like to go back on Celexa or Zoloft.

## 2022-05-24 NOTE — TELEPHONE ENCOUNTER
Spoke with patient, he said the ED has improved since being off the medication. He said he thought you were going to try him on Zoloft since the Celexa caused his ED issues. He would like a rx sent to [address] [place] [address] in [facility].

## 2022-05-25 ENCOUNTER — HOSPITAL ENCOUNTER (OUTPATIENT)
Dept: CARDIAC REHAB | Age: 52
Setting detail: THERAPIES SERIES
End: 2022-05-25
Payer: COMMERCIAL

## 2022-05-25 RX ORDER — SERTRALINE HYDROCHLORIDE 25 MG/1
25 TABLET, FILM COATED ORAL DAILY
Qty: 30 TABLET | Refills: 1 | Status: SHIPPED
Start: 2022-05-25 | End: 2022-07-08 | Stop reason: SDUPTHER

## 2022-05-25 RX ORDER — CITALOPRAM 10 MG/1
10 TABLET ORAL DAILY
Qty: 30 TABLET | Refills: 0 | OUTPATIENT
Start: 2022-05-25

## 2022-05-26 ENCOUNTER — TELEPHONE (OUTPATIENT)
Dept: VASCULAR SURGERY | Age: 52
End: 2022-05-26

## 2022-05-26 NOTE — TELEPHONE ENCOUNTER
Second Attempt: Received referral from Dr. Dejah Rodriguez for Dr. Franko Harmon regarding varicose veins, left message for patient to return call.

## 2022-05-27 ENCOUNTER — APPOINTMENT (OUTPATIENT)
Dept: CARDIAC REHAB | Age: 52
End: 2022-05-27
Payer: COMMERCIAL

## 2022-06-01 ENCOUNTER — HOSPITAL ENCOUNTER (OUTPATIENT)
Dept: CARDIAC REHAB | Age: 52
Setting detail: THERAPIES SERIES
Discharge: HOME | End: 2022-06-01
Payer: COMMERCIAL

## 2022-06-01 PROCEDURE — 93798 PHYS/QHP OP CAR RHAB W/ECG: CPT

## 2022-06-03 ENCOUNTER — HOSPITAL ENCOUNTER (OUTPATIENT)
Dept: CARDIAC REHAB | Age: 52
Setting detail: THERAPIES SERIES
Discharge: HOME | End: 2022-06-03
Payer: COMMERCIAL

## 2022-06-03 PROCEDURE — 93798 PHYS/QHP OP CAR RHAB W/ECG: CPT

## 2022-06-08 ENCOUNTER — HOSPITAL ENCOUNTER (OUTPATIENT)
Dept: CARDIAC REHAB | Age: 52
Setting detail: THERAPIES SERIES
Discharge: HOME | End: 2022-06-08
Payer: COMMERCIAL

## 2022-06-08 PROCEDURE — 93798 PHYS/QHP OP CAR RHAB W/ECG: CPT

## 2022-06-10 ENCOUNTER — HOSPITAL ENCOUNTER (OUTPATIENT)
Dept: CARDIAC REHAB | Age: 52
Setting detail: THERAPIES SERIES
Discharge: HOME | End: 2022-06-10
Payer: COMMERCIAL

## 2022-06-10 PROCEDURE — 93798 PHYS/QHP OP CAR RHAB W/ECG: CPT

## 2022-06-13 ENCOUNTER — HOSPITAL ENCOUNTER (OUTPATIENT)
Dept: CARDIAC REHAB | Age: 52
Setting detail: THERAPIES SERIES
Discharge: HOME | End: 2022-06-13
Payer: COMMERCIAL

## 2022-06-13 PROCEDURE — 93798 PHYS/QHP OP CAR RHAB W/ECG: CPT

## 2022-06-26 DIAGNOSIS — Z79.4 TYPE 2 DIABETES MELLITUS WITH DIABETIC POLYNEUROPATHY, WITH LONG-TERM CURRENT USE OF INSULIN (HCC): ICD-10-CM

## 2022-06-26 DIAGNOSIS — E11.42 TYPE 2 DIABETES MELLITUS WITH DIABETIC POLYNEUROPATHY, WITH LONG-TERM CURRENT USE OF INSULIN (HCC): ICD-10-CM

## 2022-06-27 ENCOUNTER — TELEPHONE (OUTPATIENT)
Dept: CARDIAC REHAB | Age: 52
End: 2022-06-27

## 2022-06-27 NOTE — TELEPHONE ENCOUNTER
LVM for pt to return call regarding attendance to outpatient cardiac rehab. No call/no show since 6/13/2022. 8/36 sessions completed as of today.

## 2022-06-28 RX ORDER — PEN NEEDLE, DIABETIC 32 GX3/16"
NEEDLE, DISPOSABLE MISCELLANEOUS
Qty: 100 EACH | Refills: 1 | Status: SHIPPED
Start: 2022-06-28 | End: 2022-07-14 | Stop reason: SDUPTHER

## 2022-07-01 PROBLEM — E78.5 DYSLIPIDEMIA: Status: ACTIVE | Noted: 2022-07-01

## 2022-07-01 PROBLEM — L97.929 NON-PRESSURE CHRONIC ULCER OF UNSPECIFIED PART OF LEFT LOWER LEG WITH UNSPECIFIED SEVERITY (HCC): Status: ACTIVE | Noted: 2017-02-28

## 2022-07-01 PROBLEM — I83.10 VENOUS ECZEMA: Status: ACTIVE | Noted: 2017-02-28

## 2022-07-01 PROBLEM — I87.2 PERIPHERAL VENOUS INSUFFICIENCY: Status: ACTIVE | Noted: 2017-02-28

## 2022-07-01 PROBLEM — F33.1 MODERATE RECURRENT MAJOR DEPRESSION (HCC): Status: ACTIVE | Noted: 2022-07-01

## 2022-07-01 PROBLEM — L97.929 VENOUS ULCER OF LEFT LEG (HCC): Status: ACTIVE | Noted: 2017-02-28

## 2022-07-01 PROBLEM — I83.029 VENOUS ULCER OF LEFT LEG (HCC): Status: ACTIVE | Noted: 2017-02-28

## 2022-07-01 PROBLEM — N18.2 STAGE 2 CHRONIC KIDNEY DISEASE: Status: ACTIVE | Noted: 2022-07-01

## 2022-07-01 PROBLEM — G47.33 OBSTRUCTIVE SLEEP APNEA SYNDROME: Status: ACTIVE | Noted: 2022-07-01

## 2022-07-01 PROBLEM — I25.5 ISCHEMIC CARDIOMYOPATHY: Status: ACTIVE | Noted: 2022-07-01

## 2022-07-01 PROBLEM — E11.42 POLYNEUROPATHY DUE TO TYPE 2 DIABETES MELLITUS (HCC): Status: ACTIVE | Noted: 2022-07-01

## 2022-07-01 PROBLEM — I25.10 CAD, MULTIPLE VESSEL: Status: ACTIVE | Noted: 2020-12-11

## 2022-07-01 PROBLEM — I50.21 ACUTE SYSTOLIC HEART FAILURE (HCC): Status: ACTIVE | Noted: 2020-12-11

## 2022-07-01 PROBLEM — F41.9 ANXIETY: Status: ACTIVE | Noted: 2020-12-11

## 2022-07-01 PROBLEM — I44.7 LEFT BUNDLE BRANCH BLOCK: Status: ACTIVE | Noted: 2022-07-01

## 2022-07-01 PROBLEM — M86.679 CHRONIC OSTEOMYELITIS INVOLVING ANKLE AND FOOT (HCC): Status: ACTIVE | Noted: 2022-07-01

## 2022-07-08 ENCOUNTER — TELEPHONE (OUTPATIENT)
Dept: PRIMARY CARE CLINIC | Age: 52
End: 2022-07-08

## 2022-07-08 ENCOUNTER — TELEPHONE (OUTPATIENT)
Dept: ADMINISTRATIVE | Age: 52
End: 2022-07-08

## 2022-07-08 DIAGNOSIS — Z95.1 HISTORY OF FOUR VESSEL CORONARY ARTERY BYPASS GRAFT: ICD-10-CM

## 2022-07-08 DIAGNOSIS — E11.42 TYPE 2 DIABETES MELLITUS WITH DIABETIC POLYNEUROPATHY, WITH LONG-TERM CURRENT USE OF INSULIN (HCC): ICD-10-CM

## 2022-07-08 DIAGNOSIS — Z79.4 TYPE 2 DIABETES MELLITUS WITH DIABETIC POLYNEUROPATHY, WITH LONG-TERM CURRENT USE OF INSULIN (HCC): ICD-10-CM

## 2022-07-08 RX ORDER — METOPROLOL SUCCINATE 25 MG/1
TABLET, EXTENDED RELEASE ORAL
Qty: 90 TABLET | Refills: 1 | Status: SHIPPED
Start: 2022-07-08 | End: 2022-10-13 | Stop reason: SDUPTHER

## 2022-07-08 RX ORDER — LISINOPRIL 10 MG/1
TABLET ORAL
Qty: 90 TABLET | Refills: 1 | Status: ON HOLD
Start: 2022-07-08 | End: 2022-08-05 | Stop reason: HOSPADM

## 2022-07-08 RX ORDER — SERTRALINE HYDROCHLORIDE 25 MG/1
25 TABLET, FILM COATED ORAL DAILY
Qty: 30 TABLET | Refills: 1 | Status: SHIPPED
Start: 2022-07-08 | End: 2022-09-20 | Stop reason: SDUPTHER

## 2022-07-08 RX ORDER — INSULIN GLARGINE 100 [IU]/ML
60 INJECTION, SOLUTION SUBCUTANEOUS DAILY
Qty: 54 ML | Refills: 0 | Status: SHIPPED
Start: 2022-07-08 | End: 2022-07-14 | Stop reason: SDUPTHER

## 2022-07-08 NOTE — TELEPHONE ENCOUNTER
Left message for pt to call back for clarification on medication and all other meds have been sent to pharmacy

## 2022-07-08 NOTE — TELEPHONE ENCOUNTER
Pt called to reschedule appt for Dr. Meagan Hull from 6-20-22. Cancellation notes said for office to reschedule this appointment. Please call pt back. Thanks!

## 2022-07-08 NOTE — TELEPHONE ENCOUNTER
I don't have record of Humalog use, nothing in his chart? He didn't follow up on Zoloft either, is it helping? ED better on this than Celexa?

## 2022-07-08 NOTE — TELEPHONE ENCOUNTER
Pt called into [facility], needs refills on medication HUMALOG, LISINOPRIL, METROPROLOL, ZOLOFT, LANTUS AND JARDIANCE, \"running low on all medications listed\". [facility] [address] [facility], [place] [address].      .Electronically signed by Shannan King on 7/8/22 at 11:39 AM EDT

## 2022-07-14 ENCOUNTER — TELEMEDICINE (OUTPATIENT)
Dept: ENDOCRINOLOGY | Age: 52
End: 2022-07-14
Payer: COMMERCIAL

## 2022-07-14 DIAGNOSIS — E78.5 HYPERLIPIDEMIA, UNSPECIFIED HYPERLIPIDEMIA TYPE: ICD-10-CM

## 2022-07-14 DIAGNOSIS — E11.42 TYPE 2 DIABETES MELLITUS WITH DIABETIC POLYNEUROPATHY, WITH LONG-TERM CURRENT USE OF INSULIN (HCC): ICD-10-CM

## 2022-07-14 DIAGNOSIS — Z79.4 TYPE 2 DIABETES MELLITUS WITH DIABETIC POLYNEUROPATHY, WITH LONG-TERM CURRENT USE OF INSULIN (HCC): ICD-10-CM

## 2022-07-14 DIAGNOSIS — Z91.119 DIETARY NONCOMPLIANCE: Primary | ICD-10-CM

## 2022-07-14 PROCEDURE — G8417 CALC BMI ABV UP PARAM F/U: HCPCS | Performed by: INTERNAL MEDICINE

## 2022-07-14 PROCEDURE — 99204 OFFICE O/P NEW MOD 45 MIN: CPT | Performed by: INTERNAL MEDICINE

## 2022-07-14 PROCEDURE — 3046F HEMOGLOBIN A1C LEVEL >9.0%: CPT | Performed by: INTERNAL MEDICINE

## 2022-07-14 PROCEDURE — G8427 DOCREV CUR MEDS BY ELIG CLIN: HCPCS | Performed by: INTERNAL MEDICINE

## 2022-07-14 PROCEDURE — 3017F COLORECTAL CA SCREEN DOC REV: CPT | Performed by: INTERNAL MEDICINE

## 2022-07-14 PROCEDURE — 2022F DILAT RTA XM EVC RTNOPTHY: CPT | Performed by: INTERNAL MEDICINE

## 2022-07-14 PROCEDURE — 4004F PT TOBACCO SCREEN RCVD TLK: CPT | Performed by: INTERNAL MEDICINE

## 2022-07-14 RX ORDER — INSULIN GLARGINE 100 [IU]/ML
60 INJECTION, SOLUTION SUBCUTANEOUS EVERY MORNING
Qty: 25 PEN | Refills: 3 | Status: SHIPPED
Start: 2022-07-14 | End: 2022-09-29 | Stop reason: SDUPTHER

## 2022-07-14 RX ORDER — SEMAGLUTIDE 1.34 MG/ML
INJECTION, SOLUTION SUBCUTANEOUS
Qty: 3 PEN | Refills: 3 | Status: SHIPPED
Start: 2022-07-14 | End: 2022-10-13 | Stop reason: SDUPTHER

## 2022-07-14 RX ORDER — INSULIN LISPRO 100 [IU]/ML
INJECTION, SOLUTION INTRAVENOUS; SUBCUTANEOUS
Qty: 20 PEN | Refills: 3 | Status: SHIPPED
Start: 2022-07-14 | End: 2022-10-13 | Stop reason: SDUPTHER

## 2022-07-14 RX ORDER — PEN NEEDLE, DIABETIC 32 GX3/16"
NEEDLE, DISPOSABLE MISCELLANEOUS
Qty: 350 EACH | Refills: 3 | Status: SHIPPED
Start: 2022-07-14 | End: 2022-08-31

## 2022-07-14 NOTE — PROGRESS NOTES
[address] [place] [address] Department of Endocrinology Diabetes and Metabolism   [address] [facility] [address]   Phone: [phone]  Fax: [phone]    Date of Service: 7/14/2022  Primary Care Physician: Brandy Manley MD  Provider: Joy Castro MD    Reason for the visit:  DM type 2     History of Present Illness: The history is provided by the patient. No  was used. Accuracy of the patient data is excellent. Ishmael Pedro is a very pleasant 46 y.o. male seen today for diabetes management     Ishmael Pedro was diagnosed with diabetes at age 25 and currently on Lantus 60 units daily in the AM, Jardiance 25 mg daily, Ozempic 0.5 mg weekly    The patient has been checking blood sugar few times a week     .     Most recent A1c results summarized below  Lab Results   Component Value Date/Time    LABA1C 10.2 04/26/2022 11:41 AM     Patient has had no hypoglycemic episodes   The patient has been mindful of what has been eating and following diabetic diet as encouraged  I reviewed current medications and the patient has no issues with diabetes medications  Ishmael Pedro is up to date with eye exam and denied any history of diabetic retinopathy   The patient seeing podiatrist every  Few months S/p amputation Lt big toe on Lt and small toe on the RTt   Microvascular complications:  No Retinopathy, Nephropathy or Neuropathy   Macrovascular complications: + CAD s/p CABG   The patient receives Flushot every year and up to date with the Pneumonia vaccine     PAST MEDICAL HISTORY   Past Medical History:   Diagnosis Date    Anxiety     Atrial fibrillation, unspecified type ([address]     CAD (coronary artery disease)     Cardiomyopathy ([address]     CKD (chronic kidney disease)     Depression     HTN (hypertension)     Hyperlipidemia     LBBB (left bundle branch block)     Type 2 diabetes mellitus without complication ([address]        PAST SURGICAL HISTORY   Past Surgical History:   Procedure Laterality Date    CARDIAC SURGERY      CORONARY ARTERY BYPASS GRAFT  12/2020    EYE LID SURGERY      FOOT SURGERY Bilateral     FRACTURE SURGERY      OTHER SURGICAL HISTORY      atrial clip-has card in wallet    VEIN SURGERY         SOCIAL HISTORY   Tobacco:   reports that he has never smoked. His smokeless tobacco use includes chew. Alcohol:   reports previous alcohol use. Drugs:   reports no history of drug use. FAMILY HISTORY   Family History   Problem Relation Age of Onset    Diabetes Mother     Heart Attack Mother     Coronary Art Dis Father     Heart Attack Father        ALLERGIES AND DRUG REACTIONS   No Known Allergies    CURRENT MEDICATIONS   Current Outpatient Medications   Medication Sig Dispense Refill    insulin lispro, 1 Unit Dial, (HUMALOG KWIKPEN) 100 UNIT/ML SOPN Inject 10 units with meals + following sliding scale. -200 add 3U, -250 add 6U, -300 add 9U, -350 add 12U, -400 add 15U, BS over 400 add 18U 20 pen 3    insulin glargine (LANTUS SOLOSTAR) 100 UNIT/ML injection pen Inject 60 Units into the skin every morning 25 pen 3    Insulin Pen Needle (EASY TOUCH PEN NEEDLES) 32G X 5 MM MISC USE FOUR TIMES DAILY WITH INSULIN PENS 350 each 3    empagliflozin (JARDIANCE) 25 MG tablet TAKE 1 TABLET BY MOUTH ONCE DAILY FOR 90 DAYS 90 tablet 3    OZEMPIC, 0.25 OR 0.5 MG/DOSE, 2 MG/1.5ML SOPN INJECT 0.5MG SUBCUTANEOUSLY ONCE A WEEK 3 pen 3    metoprolol succinate (TOPROL XL) 25 MG extended release tablet TAKE 1 TABLET BY MOUTH ONCE DAILY FOR 90 DAYS 90 tablet 1    lisinopril (PRINIVIL;ZESTRIL) 10 MG tablet Take one tablet daily .  90 tablet 1    sertraline (ZOLOFT) 25 MG tablet Take 1 tablet by mouth daily 30 tablet 1    rosuvastatin (CRESTOR) 40 MG tablet Take 1 tablet by mouth nightly 30 tablet 5    ONETOUCH VERIO strip USE 1 STRIP TO CHECK GLUCOSE TWICE DAILY      vitamin D3 (CHOLECALCIFEROL) 125 MCG (5000 UT) TABS tablet Take S2, no murmur, no heave. Dorsalis pedis pulse palpable   Abd: soft lax, no tenderness, no organomegaly, audible bowel sounds. Skin: warm, no lesions, no rash.  No callus, no Ulcers, No acanthosis nigricans  Musculoskeletal: No back tenderness, no kyphosis/scoliosis    Neuro: CN intact, Monofilament sensation decreased bilateral , muscle power normal  Psych: normal mood, and affect    Review of Laboratory Data:  I personally reviewed the following lab:  Lab Results   Component Value Date/Time    WBC 4.2 (L) 04/26/2022 12:00 PM    RBC 5.36 04/26/2022 12:00 PM    HGB 14.9 04/26/2022 12:00 PM    HCT 46.7 04/26/2022 12:00 PM    MCV 87.1 04/26/2022 12:00 PM    MCH 27.8 04/26/2022 12:00 PM    MCHC 31.9 (L) 04/26/2022 12:00 PM    RDW 13.6 04/26/2022 12:00 PM     04/26/2022 12:00 PM    MPV 10.8 04/26/2022 12:00 PM      Lab Results   Component Value Date/Time     04/26/2022 12:00 PM    K 5.0 04/26/2022 12:00 PM    CO2 24 04/26/2022 12:00 PM    BUN 23 (H) 04/26/2022 12:00 PM    CREATININE 1.0 04/26/2022 12:00 PM    CALCIUM 10.0 04/26/2022 12:00 PM    LABGLOM >60 04/26/2022 12:00 PM    GFRAA >60 04/26/2022 12:00 PM      No results found for: TSH, T4FREE, C7VZDPH, FT3, Y5WNDLJ, TSI, TPOABS, THGAB  Lab Results   Component Value Date/Time    LABA1C 10.2 04/26/2022 11:41 AM    GLUCOSE 262 04/26/2022 12:00 PM    MALBCR - 04/26/2022 12:00 PM    LABMICR <12.0 04/26/2022 12:00 PM    LABCREA 31 04/26/2022 12:00 PM     Lab Results   Component Value Date/Time    LABA1C 10.2 04/26/2022 11:41 AM     Lab Results   Component Value Date/Time    TRIG 133 04/26/2022 12:00 PM    HDL 49 04/26/2022 12:00 PM    LDLCALC 114 04/26/2022 12:00 PM    CHOL 190 04/26/2022 12:00 PM     Lab Results   Component Value Date/Time    VITD25 44 04/26/2022 12:00 PM       ASSESSMENT & RECOMMENDATIONS   Dejan Patino, a 46 y.o.-old male seen in for the following issues     Diabetes Mellitus Type 2     · Patient's diabetes is uncontrol   Continue Janrdiance 25 mg daily and Ozempic 0.5 mg/wk   · Will change insulin regimen to Lantus 50u daily in AM, Humalog 10u with meals + ss 3:50>150   · Ordered freestyle Lamberto   · Continue checking blood sugars 4 times a day before meals and at bedtime and send BS readings to our office in a week. · Discussed with patient A1c and blood sugar goals   · Patient up to date with the routine diabetes maintenance and prevention  · A1c at next OV     Dietary noncompliance   Discussed with patient the importance of eating consistent carbohydrate meals, avoiding high glycemic index food. Also, discussed with patient the risk and negative consequences of dietary noncompliance on blood glucose control, blood pressure and weight    HLD  · Continue Crestor 40 mg daily     I personally reviewed external notes from PCP and other patient's care team providers, and personally interpreted labs associated with the above diagnosis. I also ordered labs to further assess and manage the above addressed medical conditions. Return in about 4 months (around 11/14/2022) for DM type 2, Hyperlipidemia, VitD deficiency. The above issues were reviewed with the patient who understood and agreed with the plan. Thank you for allowing us to participate in the care of this patient. Please do not hesitate to contact us with any additional questions. Diagnosis Orders   1. Dietary noncompliance     2. Type 2 diabetes mellitus with diabetic polyneuropathy, with long-term current use of insulin (HCC)  insulin lispro, 1 Unit Dial, (HUMALOG KWIKPEN) 100 UNIT/ML SOPN    insulin glargine (LANTUS SOLOSTAR) 100 UNIT/ML injection pen    Insulin Pen Needle (EASY TOUCH PEN NEEDLES) 32G X 5 MM MISC    empagliflozin (JARDIANCE) 25 MG tablet    OZEMPIC, 0.25 OR 0.5 MG/DOSE, 2 MG/1.5ML SOPN    Hemoglobin A1C   3.  Hyperlipidemia, unspecified hyperlipidemia type         Tamia Perez MD  Endocrinologist, [facility]   [address] [facility] [place] [address]   Phone: [phone]  Fax: [phone]  --------------------------------------------  An electronic signature was used to authenticate this note.  Jorge Guillen MD on 7/14/2022 at 12:50 PM

## 2022-07-30 ENCOUNTER — TELEPHONE (OUTPATIENT)
Dept: CARDIAC REHAB | Age: 52
End: 2022-07-30

## 2022-07-30 NOTE — TELEPHONE ENCOUNTER
LVM for pt to return call regarding attendance to outpatient cardiac rehab.     6/29 pt called to state he fell and was on his way to ED  7/8- pt called to state he planned to return on 7/11 at 0800. No call/ no show  7/30- LVM for pt to return call if he planned to return to exercise to schedule appt times.

## 2022-08-02 ENCOUNTER — APPOINTMENT (OUTPATIENT)
Dept: GENERAL RADIOLOGY | Age: 52
DRG: 347 | End: 2022-08-02
Payer: COMMERCIAL

## 2022-08-02 ENCOUNTER — HOSPITAL ENCOUNTER (INPATIENT)
Age: 52
LOS: 3 days | Discharge: HOME | DRG: 347 | End: 2022-08-05
Attending: EMERGENCY MEDICINE | Admitting: FAMILY MEDICINE
Payer: COMMERCIAL

## 2022-08-02 ENCOUNTER — APPOINTMENT (OUTPATIENT)
Dept: CT IMAGING | Age: 52
DRG: 347 | End: 2022-08-02
Payer: COMMERCIAL

## 2022-08-02 DIAGNOSIS — S12.401A CLOSED NONDISPLACED FRACTURE OF FIFTH CERVICAL VERTEBRA, UNSPECIFIED FRACTURE MORPHOLOGY, INITIAL ENCOUNTER (HCC): ICD-10-CM

## 2022-08-02 DIAGNOSIS — R55 SYNCOPE AND COLLAPSE: Primary | ICD-10-CM

## 2022-08-02 LAB
ALBUMIN SERPL-MCNC: 4.9 G/DL (ref 3.5–5.2)
ALP BLD-CCNC: 90 U/L (ref 40–129)
ALT SERPL-CCNC: 26 U/L (ref 0–40)
ANION GAP SERPL CALCULATED.3IONS-SCNC: 16 MMOL/L (ref 7–16)
AST SERPL-CCNC: 17 U/L (ref 0–39)
BASOPHILS ABSOLUTE: 0.03 E9/L (ref 0–0.2)
BASOPHILS RELATIVE PERCENT: 0.4 % (ref 0–2)
BILIRUB SERPL-MCNC: 0.5 MG/DL (ref 0–1.2)
BUN BLDV-MCNC: 13 MG/DL (ref 6–20)
CALCIUM SERPL-MCNC: 10 MG/DL (ref 8.6–10.2)
CHLORIDE BLD-SCNC: 99 MMOL/L (ref 98–107)
CO2: 26 MMOL/L (ref 22–29)
CREAT SERPL-MCNC: 0.9 MG/DL (ref 0.7–1.2)
D DIMER: 305 NG/ML DDU
EOSINOPHILS ABSOLUTE: 0.04 E9/L (ref 0.05–0.5)
EOSINOPHILS RELATIVE PERCENT: 0.6 % (ref 0–6)
GFR AFRICAN AMERICAN: >60
GFR NON-AFRICAN AMERICAN: >60 ML/MIN/1.73
GLUCOSE BLD-MCNC: 127 MG/DL
GLUCOSE BLD-MCNC: 159 MG/DL (ref 74–99)
HCT VFR BLD CALC: 44.8 % (ref 37–54)
HEMOGLOBIN: 15.1 G/DL (ref 12.5–16.5)
IMMATURE GRANULOCYTES #: 0.02 E9/L
IMMATURE GRANULOCYTES %: 0.3 % (ref 0–5)
LYMPHOCYTES ABSOLUTE: 1.97 E9/L (ref 1.5–4)
LYMPHOCYTES RELATIVE PERCENT: 28.5 % (ref 20–42)
MCH RBC QN AUTO: 28.3 PG (ref 26–35)
MCHC RBC AUTO-ENTMCNC: 33.7 % (ref 32–34.5)
MCV RBC AUTO: 83.9 FL (ref 80–99.9)
METER GLUCOSE: 127 MG/DL (ref 74–99)
MONOCYTES ABSOLUTE: 0.64 E9/L (ref 0.1–0.95)
MONOCYTES RELATIVE PERCENT: 9.3 % (ref 2–12)
NEUTROPHILS ABSOLUTE: 4.21 E9/L (ref 1.8–7.3)
NEUTROPHILS RELATIVE PERCENT: 60.9 % (ref 43–80)
PDW BLD-RTO: 12.9 FL (ref 11.5–15)
PLATELET # BLD: 166 E9/L (ref 130–450)
PMV BLD AUTO: 9.8 FL (ref 7–12)
POTASSIUM REFLEX MAGNESIUM: 4.2 MMOL/L (ref 3.5–5)
RBC # BLD: 5.34 E12/L (ref 3.8–5.8)
SODIUM BLD-SCNC: 141 MMOL/L (ref 132–146)
TOTAL PROTEIN: 8.1 G/DL (ref 6.4–8.3)
TROPONIN, HIGH SENSITIVITY: 14 NG/L (ref 0–11)
TROPONIN, HIGH SENSITIVITY: 14 NG/L (ref 0–11)
WBC # BLD: 6.9 E9/L (ref 4.5–11.5)

## 2022-08-02 PROCEDURE — 71275 CT ANGIOGRAPHY CHEST: CPT

## 2022-08-02 PROCEDURE — 84484 ASSAY OF TROPONIN QUANT: CPT

## 2022-08-02 PROCEDURE — 2060000000 HC ICU INTERMEDIATE R&B

## 2022-08-02 PROCEDURE — 96374 THER/PROPH/DIAG INJ IV PUSH: CPT

## 2022-08-02 PROCEDURE — 6360000002 HC RX W HCPCS: Performed by: STUDENT IN AN ORGANIZED HEALTH CARE EDUCATION/TRAINING PROGRAM

## 2022-08-02 PROCEDURE — 72125 CT NECK SPINE W/O DYE: CPT

## 2022-08-02 PROCEDURE — 85025 COMPLETE CBC W/AUTO DIFF WBC: CPT

## 2022-08-02 PROCEDURE — 6360000004 HC RX CONTRAST MEDICATION: Performed by: RADIOLOGY

## 2022-08-02 PROCEDURE — 36415 COLL VENOUS BLD VENIPUNCTURE: CPT

## 2022-08-02 PROCEDURE — 82962 GLUCOSE BLOOD TEST: CPT

## 2022-08-02 PROCEDURE — 71045 X-RAY EXAM CHEST 1 VIEW: CPT

## 2022-08-02 PROCEDURE — 93005 ELECTROCARDIOGRAM TRACING: CPT | Performed by: STUDENT IN AN ORGANIZED HEALTH CARE EDUCATION/TRAINING PROGRAM

## 2022-08-02 PROCEDURE — 99285 EMERGENCY DEPT VISIT HI MDM: CPT

## 2022-08-02 PROCEDURE — 80053 COMPREHEN METABOLIC PANEL: CPT

## 2022-08-02 PROCEDURE — 70450 CT HEAD/BRAIN W/O DYE: CPT

## 2022-08-02 PROCEDURE — 85378 FIBRIN DEGRADE SEMIQUANT: CPT

## 2022-08-02 RX ORDER — SODIUM CHLORIDE 0.9 % (FLUSH) 0.9 %
10 SYRINGE (ML) INJECTION EVERY 12 HOURS SCHEDULED
Status: DISCONTINUED | OUTPATIENT
Start: 2022-08-03 | End: 2022-08-05 | Stop reason: HOSPADM

## 2022-08-02 RX ORDER — LISINOPRIL 10 MG/1
10 TABLET ORAL DAILY
Status: DISCONTINUED | OUTPATIENT
Start: 2022-08-03 | End: 2022-08-05 | Stop reason: HOSPADM

## 2022-08-02 RX ORDER — POLYETHYLENE GLYCOL 3350 17 G/17G
17 POWDER, FOR SOLUTION ORAL DAILY PRN
Status: DISCONTINUED | OUTPATIENT
Start: 2022-08-02 | End: 2022-08-05 | Stop reason: HOSPADM

## 2022-08-02 RX ORDER — BUSPIRONE HYDROCHLORIDE 7.5 MG/1
15 TABLET ORAL 2 TIMES DAILY
Status: DISCONTINUED | OUTPATIENT
Start: 2022-08-03 | End: 2022-08-05 | Stop reason: HOSPADM

## 2022-08-02 RX ORDER — SERTRALINE HYDROCHLORIDE 25 MG/1
25 TABLET, FILM COATED ORAL DAILY
Status: DISCONTINUED | OUTPATIENT
Start: 2022-08-03 | End: 2022-08-05 | Stop reason: HOSPADM

## 2022-08-02 RX ORDER — ACETAMINOPHEN 325 MG/1
650 TABLET ORAL EVERY 6 HOURS PRN
Status: DISCONTINUED | OUTPATIENT
Start: 2022-08-02 | End: 2022-08-05 | Stop reason: HOSPADM

## 2022-08-02 RX ORDER — ASPIRIN 81 MG/1
162 TABLET ORAL DAILY
Status: DISCONTINUED | OUTPATIENT
Start: 2022-08-03 | End: 2022-08-05 | Stop reason: HOSPADM

## 2022-08-02 RX ORDER — PROMETHAZINE HYDROCHLORIDE 12.5 MG/1
12.5 TABLET ORAL EVERY 6 HOURS PRN
Status: DISCONTINUED | OUTPATIENT
Start: 2022-08-02 | End: 2022-08-05 | Stop reason: HOSPADM

## 2022-08-02 RX ORDER — INSULIN GLARGINE-YFGN 100 [IU]/ML
60 INJECTION, SOLUTION SUBCUTANEOUS
Status: DISCONTINUED | OUTPATIENT
Start: 2022-08-03 | End: 2022-08-05 | Stop reason: HOSPADM

## 2022-08-02 RX ORDER — ONDANSETRON 2 MG/ML
4 INJECTION INTRAMUSCULAR; INTRAVENOUS EVERY 6 HOURS PRN
Status: DISCONTINUED | OUTPATIENT
Start: 2022-08-02 | End: 2022-08-05 | Stop reason: HOSPADM

## 2022-08-02 RX ORDER — ENOXAPARIN SODIUM 100 MG/ML
30 INJECTION SUBCUTANEOUS 2 TIMES DAILY
Status: DISCONTINUED | OUTPATIENT
Start: 2022-08-03 | End: 2022-08-05 | Stop reason: HOSPADM

## 2022-08-02 RX ORDER — FUROSEMIDE 20 MG/1
20 TABLET ORAL DAILY
Status: DISCONTINUED | OUTPATIENT
Start: 2022-08-03 | End: 2022-08-05 | Stop reason: HOSPADM

## 2022-08-02 RX ORDER — GABAPENTIN 300 MG/1
300 CAPSULE ORAL 3 TIMES DAILY
Status: DISCONTINUED | OUTPATIENT
Start: 2022-08-03 | End: 2022-08-05 | Stop reason: HOSPADM

## 2022-08-02 RX ORDER — ROSUVASTATIN CALCIUM 20 MG/1
40 TABLET, COATED ORAL NIGHTLY
Status: DISCONTINUED | OUTPATIENT
Start: 2022-08-03 | End: 2022-08-05 | Stop reason: HOSPADM

## 2022-08-02 RX ORDER — ACETAMINOPHEN 650 MG/1
650 SUPPOSITORY RECTAL EVERY 6 HOURS PRN
Status: DISCONTINUED | OUTPATIENT
Start: 2022-08-02 | End: 2022-08-05 | Stop reason: HOSPADM

## 2022-08-02 RX ORDER — METOPROLOL SUCCINATE 25 MG/1
25 TABLET, EXTENDED RELEASE ORAL DAILY
Status: DISCONTINUED | OUTPATIENT
Start: 2022-08-03 | End: 2022-08-05 | Stop reason: HOSPADM

## 2022-08-02 RX ORDER — KETOROLAC TROMETHAMINE 30 MG/ML
15 INJECTION, SOLUTION INTRAMUSCULAR; INTRAVENOUS ONCE
Status: COMPLETED | OUTPATIENT
Start: 2022-08-02 | End: 2022-08-02

## 2022-08-02 RX ORDER — SODIUM CHLORIDE 0.9 % (FLUSH) 0.9 %
10 SYRINGE (ML) INJECTION PRN
Status: DISCONTINUED | OUTPATIENT
Start: 2022-08-02 | End: 2022-08-05 | Stop reason: HOSPADM

## 2022-08-02 RX ORDER — SODIUM CHLORIDE 9 MG/ML
INJECTION, SOLUTION INTRAVENOUS PRN
Status: DISCONTINUED | OUTPATIENT
Start: 2022-08-02 | End: 2022-08-05 | Stop reason: HOSPADM

## 2022-08-02 RX ADMIN — IOPAMIDOL 70 ML: 755 INJECTION, SOLUTION INTRAVENOUS at 21:24

## 2022-08-02 RX ADMIN — KETOROLAC TROMETHAMINE 15 MG: 30 INJECTION, SOLUTION INTRAMUSCULAR at 22:45

## 2022-08-02 ASSESSMENT — ENCOUNTER SYMPTOMS
SHORTNESS OF BREATH: 1
ABDOMINAL PAIN: 0
NAUSEA: 0
SORE THROAT: 0
VOMITING: 0
EYE REDNESS: 0
DIARRHEA: 0
EYE DISCHARGE: 0
SINUS PRESSURE: 0
WHEEZING: 0
BACK PAIN: 0
COUGH: 0
EYE PAIN: 0

## 2022-08-03 PROBLEM — I50.22 CHRONIC SYSTOLIC CHF (CONGESTIVE HEART FAILURE) (HCC): Status: ACTIVE | Noted: 2022-08-03

## 2022-08-03 PROBLEM — S12.401A CLOSED NONDISPLACED FRACTURE OF FIFTH CERVICAL VERTEBRA (HCC): Status: ACTIVE | Noted: 2022-08-03

## 2022-08-03 PROBLEM — R29.6 FREQUENT FALLS: Status: ACTIVE | Noted: 2022-08-03

## 2022-08-03 LAB
ALBUMIN SERPL-MCNC: 4.6 G/DL (ref 3.5–5.2)
ALP BLD-CCNC: 85 U/L (ref 40–129)
ALT SERPL-CCNC: 24 U/L (ref 0–40)
ANION GAP SERPL CALCULATED.3IONS-SCNC: 11 MMOL/L (ref 7–16)
AST SERPL-CCNC: 17 U/L (ref 0–39)
BACTERIA: ABNORMAL /HPF
BASOPHILS ABSOLUTE: 0.04 E9/L (ref 0–0.2)
BASOPHILS RELATIVE PERCENT: 0.7 % (ref 0–2)
BILIRUB SERPL-MCNC: 0.5 MG/DL (ref 0–1.2)
BILIRUBIN URINE: NEGATIVE
BLOOD, URINE: NEGATIVE
BUN BLDV-MCNC: 16 MG/DL (ref 6–20)
CALCIUM SERPL-MCNC: 9.5 MG/DL (ref 8.6–10.2)
CHLORIDE BLD-SCNC: 97 MMOL/L (ref 98–107)
CLARITY: CLEAR
CO2: 27 MMOL/L (ref 22–29)
COLOR: YELLOW
CREAT SERPL-MCNC: 1 MG/DL (ref 0.7–1.2)
EKG ATRIAL RATE: 79 BPM
EKG P AXIS: 28 DEGREES
EKG P-R INTERVAL: 202 MS
EKG Q-T INTERVAL: 366 MS
EKG QRS DURATION: 102 MS
EKG QTC CALCULATION (BAZETT): 419 MS
EKG R AXIS: 24 DEGREES
EKG T AXIS: -148 DEGREES
EKG VENTRICULAR RATE: 79 BPM
EOSINOPHILS ABSOLUTE: 0.05 E9/L (ref 0.05–0.5)
EOSINOPHILS RELATIVE PERCENT: 0.8 % (ref 0–6)
GFR AFRICAN AMERICAN: >60
GFR NON-AFRICAN AMERICAN: >60 ML/MIN/1.73
GLUCOSE BLD-MCNC: 174 MG/DL (ref 74–99)
GLUCOSE URINE: >=1000 MG/DL
HCT VFR BLD CALC: 43.2 % (ref 37–54)
HEMOGLOBIN: 14.4 G/DL (ref 12.5–16.5)
IMMATURE GRANULOCYTES #: 0.01 E9/L
IMMATURE GRANULOCYTES %: 0.2 % (ref 0–5)
KETONES, URINE: NEGATIVE MG/DL
LEUKOCYTE ESTERASE, URINE: NEGATIVE
LV EF: 53 %
LVEF MODALITY: NORMAL
LYMPHOCYTES ABSOLUTE: 2.29 E9/L (ref 1.5–4)
LYMPHOCYTES RELATIVE PERCENT: 38.7 % (ref 20–42)
MCH RBC QN AUTO: 28.9 PG (ref 26–35)
MCHC RBC AUTO-ENTMCNC: 33.3 % (ref 32–34.5)
MCV RBC AUTO: 86.7 FL (ref 80–99.9)
METER GLUCOSE: 160 MG/DL (ref 74–99)
METER GLUCOSE: 238 MG/DL (ref 74–99)
METER GLUCOSE: 255 MG/DL (ref 74–99)
METER GLUCOSE: 280 MG/DL (ref 74–99)
METER GLUCOSE: 291 MG/DL (ref 74–99)
MONOCYTES ABSOLUTE: 0.56 E9/L (ref 0.1–0.95)
MONOCYTES RELATIVE PERCENT: 9.5 % (ref 2–12)
NEUTROPHILS ABSOLUTE: 2.97 E9/L (ref 1.8–7.3)
NEUTROPHILS RELATIVE PERCENT: 50.1 % (ref 43–80)
NITRITE, URINE: NEGATIVE
PDW BLD-RTO: 12.9 FL (ref 11.5–15)
PH UA: 5.5 (ref 5–9)
PLATELET # BLD: 149 E9/L (ref 130–450)
PMV BLD AUTO: 9.7 FL (ref 7–12)
POTASSIUM REFLEX MAGNESIUM: 4 MMOL/L (ref 3.5–5)
PROTEIN UA: NEGATIVE MG/DL
RBC # BLD: 4.98 E12/L (ref 3.8–5.8)
RBC UA: ABNORMAL /HPF (ref 0–2)
SODIUM BLD-SCNC: 135 MMOL/L (ref 132–146)
SPECIFIC GRAVITY UA: 1.01 (ref 1–1.03)
TOTAL PROTEIN: 7.6 G/DL (ref 6.4–8.3)
UROBILINOGEN, URINE: 0.2 E.U./DL
WBC # BLD: 5.9 E9/L (ref 4.5–11.5)
WBC UA: ABNORMAL /HPF (ref 0–5)

## 2022-08-03 PROCEDURE — 81001 URINALYSIS AUTO W/SCOPE: CPT

## 2022-08-03 PROCEDURE — 99222 1ST HOSP IP/OBS MODERATE 55: CPT | Performed by: NEUROLOGICAL SURGERY

## 2022-08-03 PROCEDURE — 6370000000 HC RX 637 (ALT 250 FOR IP): Performed by: FAMILY MEDICINE

## 2022-08-03 PROCEDURE — 82962 GLUCOSE BLOOD TEST: CPT

## 2022-08-03 PROCEDURE — 80053 COMPREHEN METABOLIC PANEL: CPT

## 2022-08-03 PROCEDURE — 36415 COLL VENOUS BLD VENIPUNCTURE: CPT

## 2022-08-03 PROCEDURE — 2580000003 HC RX 258: Performed by: INTERNAL MEDICINE

## 2022-08-03 PROCEDURE — 93306 TTE W/DOPPLER COMPLETE: CPT

## 2022-08-03 PROCEDURE — 6370000000 HC RX 637 (ALT 250 FOR IP): Performed by: INTERNAL MEDICINE

## 2022-08-03 PROCEDURE — S5553 INSULIN LONG ACTING 5 U: HCPCS | Performed by: FAMILY MEDICINE

## 2022-08-03 PROCEDURE — 6360000004 HC RX CONTRAST MEDICATION: Performed by: FAMILY MEDICINE

## 2022-08-03 PROCEDURE — 2060000000 HC ICU INTERMEDIATE R&B

## 2022-08-03 PROCEDURE — 2580000003 HC RX 258: Performed by: FAMILY MEDICINE

## 2022-08-03 PROCEDURE — 85025 COMPLETE CBC W/AUTO DIFF WBC: CPT

## 2022-08-03 PROCEDURE — 6360000002 HC RX W HCPCS: Performed by: FAMILY MEDICINE

## 2022-08-03 RX ORDER — OXYCODONE HYDROCHLORIDE 5 MG/1
2.5 TABLET ORAL EVERY 6 HOURS PRN
Status: DISCONTINUED | OUTPATIENT
Start: 2022-08-03 | End: 2022-08-05 | Stop reason: HOSPADM

## 2022-08-03 RX ORDER — INSULIN LISPRO 100 [IU]/ML
0-4 INJECTION, SOLUTION INTRAVENOUS; SUBCUTANEOUS NIGHTLY
Status: DISCONTINUED | OUTPATIENT
Start: 2022-08-03 | End: 2022-08-05 | Stop reason: HOSPADM

## 2022-08-03 RX ORDER — INSULIN LISPRO 100 [IU]/ML
0-16 INJECTION, SOLUTION INTRAVENOUS; SUBCUTANEOUS
Status: DISCONTINUED | OUTPATIENT
Start: 2022-08-04 | End: 2022-08-05 | Stop reason: HOSPADM

## 2022-08-03 RX ORDER — INSULIN LISPRO 100 [IU]/ML
0-16 INJECTION, SOLUTION INTRAVENOUS; SUBCUTANEOUS
Status: DISCONTINUED | OUTPATIENT
Start: 2022-08-04 | End: 2022-08-03

## 2022-08-03 RX ORDER — INSULIN LISPRO 100 [IU]/ML
0-8 INJECTION, SOLUTION INTRAVENOUS; SUBCUTANEOUS
Status: DISCONTINUED | OUTPATIENT
Start: 2022-08-03 | End: 2022-08-03

## 2022-08-03 RX ORDER — DEXTROSE MONOHYDRATE 100 MG/ML
INJECTION, SOLUTION INTRAVENOUS CONTINUOUS PRN
Status: DISCONTINUED | OUTPATIENT
Start: 2022-08-03 | End: 2022-08-05 | Stop reason: HOSPADM

## 2022-08-03 RX ORDER — OXYCODONE AND ACETAMINOPHEN 7.5; 325 MG/1; MG/1
1 TABLET ORAL EVERY 6 HOURS PRN
Status: DISCONTINUED | OUTPATIENT
Start: 2022-08-03 | End: 2022-08-03 | Stop reason: CLARIF

## 2022-08-03 RX ORDER — SODIUM CHLORIDE 9 MG/ML
INJECTION, SOLUTION INTRAVENOUS CONTINUOUS
Status: ACTIVE | OUTPATIENT
Start: 2022-08-03 | End: 2022-08-04

## 2022-08-03 RX ORDER — INSULIN LISPRO 100 [IU]/ML
0-4 INJECTION, SOLUTION INTRAVENOUS; SUBCUTANEOUS NIGHTLY
Status: DISCONTINUED | OUTPATIENT
Start: 2022-08-03 | End: 2022-08-03

## 2022-08-03 RX ORDER — OXYCODONE HYDROCHLORIDE AND ACETAMINOPHEN 5; 325 MG/1; MG/1
1 TABLET ORAL EVERY 6 HOURS PRN
Status: DISCONTINUED | OUTPATIENT
Start: 2022-08-03 | End: 2022-08-05 | Stop reason: HOSPADM

## 2022-08-03 RX ADMIN — SERTRALINE HYDROCHLORIDE 25 MG: 25 TABLET ORAL at 08:41

## 2022-08-03 RX ADMIN — BUSPIRONE HYDROCHLORIDE 15 MG: 7.5 TABLET ORAL at 08:41

## 2022-08-03 RX ADMIN — INSULIN LISPRO 4 UNITS: 100 INJECTION, SOLUTION INTRAVENOUS; SUBCUTANEOUS at 17:55

## 2022-08-03 RX ADMIN — ENOXAPARIN SODIUM 30 MG: 100 INJECTION SUBCUTANEOUS at 20:43

## 2022-08-03 RX ADMIN — ASPIRIN 162 MG: 81 TABLET, COATED ORAL at 08:38

## 2022-08-03 RX ADMIN — Medication 5000 UNITS: at 08:41

## 2022-08-03 RX ADMIN — OXYCODONE AND ACETAMINOPHEN 1 TABLET: 5; 325 TABLET ORAL at 08:36

## 2022-08-03 RX ADMIN — LISINOPRIL 10 MG: 10 TABLET ORAL at 08:38

## 2022-08-03 RX ADMIN — GABAPENTIN 300 MG: 300 CAPSULE ORAL at 14:59

## 2022-08-03 RX ADMIN — ENOXAPARIN SODIUM 30 MG: 100 INJECTION SUBCUTANEOUS at 08:44

## 2022-08-03 RX ADMIN — INSULIN GLARGINE-YFGN 60 UNITS: 100 INJECTION, SOLUTION SUBCUTANEOUS at 09:02

## 2022-08-03 RX ADMIN — SODIUM CHLORIDE: 9 INJECTION, SOLUTION INTRAVENOUS at 15:50

## 2022-08-03 RX ADMIN — OXYCODONE 2.5 MG: 5 TABLET ORAL at 08:37

## 2022-08-03 RX ADMIN — GABAPENTIN 300 MG: 300 CAPSULE ORAL at 02:05

## 2022-08-03 RX ADMIN — OXYCODONE 2.5 MG: 5 TABLET ORAL at 20:44

## 2022-08-03 RX ADMIN — GABAPENTIN 300 MG: 300 CAPSULE ORAL at 08:38

## 2022-08-03 RX ADMIN — ACETAMINOPHEN 650 MG: 325 TABLET ORAL at 23:41

## 2022-08-03 RX ADMIN — GABAPENTIN 300 MG: 300 CAPSULE ORAL at 20:44

## 2022-08-03 RX ADMIN — OXYCODONE AND ACETAMINOPHEN 1 TABLET: 5; 325 TABLET ORAL at 20:43

## 2022-08-03 RX ADMIN — METOPROLOL SUCCINATE 25 MG: 25 TABLET, EXTENDED RELEASE ORAL at 08:38

## 2022-08-03 RX ADMIN — ENOXAPARIN SODIUM 30 MG: 100 INJECTION SUBCUTANEOUS at 02:06

## 2022-08-03 RX ADMIN — SODIUM CHLORIDE, PRESERVATIVE FREE 10 ML: 5 INJECTION INTRAVENOUS at 08:43

## 2022-08-03 RX ADMIN — INSULIN LISPRO 2 UNITS: 100 INJECTION, SOLUTION INTRAVENOUS; SUBCUTANEOUS at 12:26

## 2022-08-03 RX ADMIN — PERFLUTREN 1.65 MG: 6.52 INJECTION, SUSPENSION INTRAVENOUS at 12:16

## 2022-08-03 RX ADMIN — ROSUVASTATIN 40 MG: 20 TABLET, FILM COATED ORAL at 20:44

## 2022-08-03 RX ADMIN — ROSUVASTATIN 40 MG: 20 TABLET, FILM COATED ORAL at 02:05

## 2022-08-03 RX ADMIN — OXYCODONE AND ACETAMINOPHEN 1 TABLET: 5; 325 TABLET ORAL at 02:06

## 2022-08-03 RX ADMIN — BUSPIRONE HYDROCHLORIDE 15 MG: 7.5 TABLET ORAL at 04:10

## 2022-08-03 RX ADMIN — EMPAGLIFLOZIN 25 MG: 10 TABLET, FILM COATED ORAL at 08:40

## 2022-08-03 RX ADMIN — FUROSEMIDE 20 MG: 20 TABLET ORAL at 09:39

## 2022-08-03 RX ADMIN — OXYCODONE AND ACETAMINOPHEN 1 TABLET: 5; 325 TABLET ORAL at 14:59

## 2022-08-03 RX ADMIN — OXYCODONE 2.5 MG: 5 TABLET ORAL at 14:59

## 2022-08-03 ASSESSMENT — PAIN SCALES - GENERAL
PAINLEVEL_OUTOF10: 6
PAINLEVEL_OUTOF10: 6
PAINLEVEL_OUTOF10: 8
PAINLEVEL_OUTOF10: 9
PAINLEVEL_OUTOF10: 9
PAINLEVEL_OUTOF10: 8

## 2022-08-03 ASSESSMENT — PAIN DESCRIPTION - DESCRIPTORS
DESCRIPTORS: ACHING;DISCOMFORT;SORE
DESCRIPTORS: ACHING;DISCOMFORT;TINGLING

## 2022-08-03 ASSESSMENT — PAIN - FUNCTIONAL ASSESSMENT: PAIN_FUNCTIONAL_ASSESSMENT: PREVENTS OR INTERFERES SOME ACTIVE ACTIVITIES AND ADLS

## 2022-08-03 ASSESSMENT — PAIN DESCRIPTION - LOCATION
LOCATION: BACK;NECK
LOCATION: NECK;BACK;ARM
LOCATION: BACK;NECK;ARM

## 2022-08-03 NOTE — ED PROVIDER NOTES
Department of Emergency Medicine   ED  Provider Note  Admit Date/RoomTime: 8/2/2022  7:45 PM  ED Room: [facility]          History of Present Illness:  8/2/22, Time: 8:12 PM EDT  Chief Complaint   Patient presents with    Dizziness     Pt arrives to ED w/ recurring lightheadedness/dizziness x 1 month,along with multiple falls over the last month. Last fall was yesterday, pt fell and hit head on bath tub, unknown LOC, pt refused to come to hospital last night per EMS. FSBS 167. +CP/rib pain/neck pain. +intermittent SOB -blood thinners. Jessica Ashby is a 46 y.o. male presenting to the ED for near syncopal events and frequent falls, beginning 1 month ago. The complaint has been intermittent, moderate in severity, and worsened by changing position. Patient states his last fall was yesterday. He states he did hit his head against the bath tub. He does not remember the event. He is currently complaining of chest and neck pain from where he fell. He states he is not on any blood thinners. Review of Systems   Constitutional:  Negative for chills and fever. HENT:  Negative for ear pain, sinus pressure and sore throat. Eyes:  Negative for pain, discharge and redness. Respiratory:  Positive for shortness of breath. Negative for cough and wheezing. Cardiovascular:  Positive for chest pain. Gastrointestinal:  Negative for abdominal pain, diarrhea, nausea and vomiting. Genitourinary:  Negative for dysuria and frequency. Musculoskeletal:  Positive for neck pain. Negative for arthralgias and back pain. Skin:  Negative for rash and wound. Neurological:  Positive for weakness (Right arm) and light-headedness. Negative for headaches. Hematological:  Negative for adenopathy.    All other systems reviewed and are negative.       --------------------------------------------- PAST HISTORY ---------------------------------------------  Past Medical History:  has a past medical history of Anxiety, Atrial fibrillation, unspecified type ([facility] [address] CAD (coronary artery disease), Cardiomyopathy ([facility] [address] CKD (chronic kidney disease), Depression, HTN (hypertension), Hyperlipidemia, LBBB (left bundle branch block), and Type 2 diabetes mellitus without complication ([facility] [address] Past Surgical History:  has a past surgical history that includes Foot surgery (Bilateral); Coronary artery bypass graft (12/2020); Vein Surgery; Eye Lid Surgery; other surgical history; Cardiac surgery; and fracture surgery. Social History:  reports that he has never smoked. His smokeless tobacco use includes chew. He reports that he does not currently use alcohol. He reports that he does not use drugs. Family History: family history includes Coronary Art Dis in his father; Diabetes in his mother; Heart Attack in his father and mother. . Unless otherwise noted, family history is non contributory    The patients home medications have been reviewed. Allergies: Patient has no known allergies. ---------------------------------------------------PHYSICAL EXAM--------------------------------------    Physical Exam  Vitals and nursing note reviewed. Constitutional:       Appearance: He is well-developed. He is obese. HENT:      Head: Normocephalic and atraumatic. Eyes:      Conjunctiva/sclera: Conjunctivae normal.      Pupils: Pupils are equal, round, and reactive to light. Cardiovascular:      Rate and Rhythm: Normal rate and regular rhythm. Heart sounds: Normal heart sounds. No murmur heard. Pulmonary:      Effort: Pulmonary effort is normal. No respiratory distress. Breath sounds: Normal breath sounds. No wheezing or rales. Abdominal:      General: Bowel sounds are normal.      Palpations: Abdomen is soft. Tenderness: There is no abdominal tenderness. There is no guarding or rebound. Musculoskeletal:         General: No tenderness or deformity. Cervical back: Normal range of motion and neck supple.    Skin: General: Skin is warm and dry. Neurological:      Mental Status: He is alert and oriented to person, place, and time. Cranial Nerves: No cranial nerve deficit. Motor: No weakness. Coordination: Coordination normal.          -------------------------------------------------- RESULTS -------------------------------------------------  I have personally reviewed all laboratory and imaging results for this patient. Results are listed below.      LABS: (Lab results interpreted by me)  Results for orders placed or performed during the hospital encounter of 08/02/22   CBC with Auto Differential   Result Value Ref Range    WBC 6.9 4.5 - 11.5 E9/L    RBC 5.34 3.80 - 5.80 E12/L    Hemoglobin 15.1 12.5 - 16.5 g/dL    Hematocrit 44.8 37.0 - 54.0 %    MCV 83.9 80.0 - 99.9 fL    MCH 28.3 26.0 - 35.0 pg    MCHC 33.7 32.0 - 34.5 %    RDW 12.9 11.5 - 15.0 fL    Platelets 191 431 - 174 E9/L    MPV 9.8 7.0 - 12.0 fL    Neutrophils % 60.9 43.0 - 80.0 %    Immature Granulocytes % 0.3 0.0 - 5.0 %    Lymphocytes % 28.5 20.0 - 42.0 %    Monocytes % 9.3 2.0 - 12.0 %    Eosinophils % 0.6 0.0 - 6.0 %    Basophils % 0.4 0.0 - 2.0 %    Neutrophils Absolute 4.21 1.80 - 7.30 E9/L    Immature Granulocytes # 0.02 E9/L    Lymphocytes Absolute 1.97 1.50 - 4.00 E9/L    Monocytes Absolute 0.64 0.10 - 0.95 E9/L    Eosinophils Absolute 0.04 (L) 0.05 - 0.50 E9/L    Basophils Absolute 0.03 0.00 - 0.20 E9/L   Comprehensive Metabolic Panel w/ Reflex to MG   Result Value Ref Range    Sodium 141 132 - 146 mmol/L    Potassium reflex Magnesium 4.2 3.5 - 5.0 mmol/L    Chloride 99 98 - 107 mmol/L    CO2 26 22 - 29 mmol/L    Anion Gap 16 7 - 16 mmol/L    Glucose 159 (H) 74 - 99 mg/dL    BUN 13 6 - 20 mg/dL    Creatinine 0.9 0.7 - 1.2 mg/dL    GFR Non-African American >60 >=60 mL/min/1.73    GFR African American >60     Calcium 10.0 8.6 - 10.2 mg/dL    Total Protein 8.1 6.4 - 8.3 g/dL    Albumin 4.9 3.5 - 5.2 g/dL    Total Bilirubin 0.5 0.0 - 1.2 mg/dL    Alkaline Phosphatase 90 40 - 129 U/L    ALT 26 0 - 40 U/L    AST 17 0 - 39 U/L   Troponin   Result Value Ref Range    Troponin, High Sensitivity 14 (H) 0 - 11 ng/L   D-Dimer, Quantitative   Result Value Ref Range    D-Dimer, Quant 305 ng/mL DDU   Troponin   Result Value Ref Range    Troponin, High Sensitivity 14 (H) 0 - 11 ng/L   POCT Glucose   Result Value Ref Range    Glucose 127 mg/dL   POCT Glucose   Result Value Ref Range    Meter Glucose 127 (H) 74 - 99 mg/dL   EKG 12 Lead   Result Value Ref Range    Ventricular Rate 79 BPM    Atrial Rate 79 BPM    P-R Interval 202 ms    QRS Duration 102 ms    Q-T Interval 366 ms    QTc Calculation (Bazett) 419 ms    P Axis 28 degrees    R Axis 24 degrees    T Axis -148 degrees   ,       RADIOLOGY:  Interpreted by Radiologist unless otherwise specified  CT Head WO Contrast   Final Result   No acute intracranial abnormality. CT Cervical Spine WO Contrast   Final Result   Addendum (preliminary) 1 of 1   ADDENDUM:   I discussed the findings with the emergency room attending physician at   approximately 10 p.m. the         Final   Small oblique nondisplaced anterior inferior endplate C5 fracture at the site   of the spur formation. I doubt this is a significant fracture clinically. Otherwise unremarkable exam.            CTA PULMONARY W CONTRAST   Final Result   No evidence of pulmonary embolism or acute pulmonary abnormality. XR CHEST PORTABLE   Final Result   No acute process.                           ------------------------- NURSING NOTES AND VITALS REVIEWED ---------------------------   The nursing notes within the ED encounter and vital signs as below have been reviewed by myself  BP (!) 141/75   Pulse 88   Temp 98.5 °F (36.9 °C) (Oral)   Resp 18   Ht 6' 7\" (2.007 m)   Wt 278 lb (126.1 kg)   SpO2 97%   BMI 31.32 kg/m²     Oxygen Saturation Interpretation: Normal      The patients available past medical records and past encounters were reviewed. ------------------------------ ED COURSE/MEDICAL DECISION MAKING----------------------  Medications   iopamidol (ISOVUE-370) 76 % injection 70 mL (70 mLs IntraVENous Given 8/2/22 2124)   ketorolac (TORADOL) injection 15 mg (15 mg IntraVENous Given 8/2/22 2245)            Medical Decision Making:     Patient presents with syncopal falls. Troponins with nonsignificant delta. EKG did not meet STEMI Criteria. Imaging was obtained which showed a c5 fracture. Patient was placed in ccollar and neurosurgery was consulted. No acute intracranial processes. Toradol given for pain. Labwork otherwise largely unremarkable. Given patient's significant medical history and frequent syncopal events, patient will be admitted for further evaluation and care of his syncope. ED Course as of 08/02/22 2305   Tue Aug 02, 2022   2029 EKG shows sinus rhythm with occasional PVCs and a first-degree AV block at a ventricular rate of 79 bpm.  Normal axis. Does not meet STEMI criteria. There are nonspecific T wave changes in the far lateral and lateral leads. Comparable to previous EKG on 5/13/2022. [BB]      ED Course User Index  [BB] Dayana Tripathi DO        Re-Evaluations:  Patient was reevaluted and continued to be stable. This patient's ED course included: a personal history and physicial examination, multiple bedside re-evaluations, IV medications, and cardiac monitoring    This patient has remained hemodynamically stable during their ED course. Consultations:  Neurosurgery      Counseling: The emergency provider has spoken with the patient and discussed todays results, in addition to providing specific details for the plan of care and counseling regarding the diagnosis and prognosis. Questions are answered at this time and they are agreeable with the plan.       --------------------------------- IMPRESSION AND DISPOSITION ---------------------------------    IMPRESSION  1.  Syncope and collapse 2. Closed nondisplaced fracture of fifth cervical vertebra, unspecified fracture morphology, initial encounter ([facility] [address]        DISPOSITION  Disposition: Admit to telemetry  Patient condition is stable    Patient was seen and evaluated by both myself and Benito Gee MD.      NOTE: This report was transcribed using voice recognition software.  Every effort was made to ensure accuracy; however, inadvertent computerized transcription errors may be present           Martha Ames DO  Resident  08/02/22 [id number]

## 2022-08-03 NOTE — PROGRESS NOTES
Patient states that he thinks he already received his 60 units of long acting insulin this morning.  SEMGLEE-YGN dispensed from [facility] on unit this morning at 6:51 AM.

## 2022-08-03 NOTE — H&P
Hospitalist History & Physical      PCP: Shilpa Butts MD    Date of Service: Pt seen/examined on 8/2/2022     Chief Complaint:  had concerns including Dizziness (Pt arrives to ED w/ recurring lightheadedness/dizziness x 1 month,along with multiple falls over the last month. Last fall was yesterday, pt fell and hit head on bath tub, unknown LOC, pt refused to come to hospital last night per EMS. FSBS 167. +CP/rib pain/neck pain. +intermittent SOB -blood thinners. ). History Of Present Illness:    Mr. González Vital, a 46y.o. year old male  who denies to the emergency department with complaints of syncope and multiple falls. Last fall was yesterday. He states he hit his head against the bathtub. Currently complaining of chest and neck pain on the areas that he fell on. Vital signs within normal limits and stable. Oratory studies notable for glucose 159, proBNP 221, troponin 14. T the head unremarkable. CT cervical spine shows small oblique nondisplaced anterior inferior endplate C5 fracture. CT chest was unremarkable. Neurosurgery was consulted. Cervical collar was placed in the emergency department. Medicine consulted for admission    Past Medical History:   Diagnosis Date    Anxiety     Atrial fibrillation, unspecified type ([address]     CAD (coronary artery disease)     Cardiomyopathy ([address]     CKD (chronic kidney disease)     Depression     HTN (hypertension)     Hyperlipidemia     LBBB (left bundle branch block)     Type 2 diabetes mellitus without complication ([address]        Past Surgical History:   Procedure Laterality Date    CARDIAC SURGERY      CORONARY ARTERY BYPASS GRAFT  12/2020    EYE LID SURGERY      FOOT SURGERY Bilateral     FRACTURE SURGERY      OTHER SURGICAL HISTORY      atrial clip-has card in wallet    VEIN SURGERY         Prior to Admission medications    Medication Sig Start Date End Date Taking?  Authorizing Provider   insulin lispro, 1 Unit Dial, (HUMALOG KWIKPEN) 100 UNIT/ML SOPN Inject 10 units with meals + following sliding scale. -200 add 3U, -250 add 6U, -300 add 9U, -350 add 12U, -400 add 15U, BS over 400 add 18U 7/14/22   Stephanie Aquino MD   insulin glargine (LANTUS SOLOSTAR) 100 UNIT/ML injection pen Inject 60 Units into the skin every morning 7/14/22   Stephanie Aquino MD   Insulin Pen Needle (EASY TOUCH PEN NEEDLES) 32G X 5 MM MISC USE FOUR TIMES DAILY WITH INSULIN PENS 7/14/22   Stephanie Aquino MD   empagliflozin (JARDIANCE) 25 MG tablet TAKE 1 TABLET BY MOUTH ONCE DAILY FOR 90 DAYS 7/14/22   Abdulaziz Coleman MD   OZEMPIC, 0.25 OR 0.5 MG/DOSE, 2 MG/1.5ML SOPN INJECT 0.5MG SUBCUTANEOUSLY ONCE A WEEK 7/14/22   Abdulaziz Coleman MD   metoprolol succinate (TOPROL XL) 25 MG extended release tablet TAKE 1 TABLET BY MOUTH ONCE DAILY FOR 90 DAYS 7/8/22   Tanvir Ventura MD   lisinopril (PRINIVIL;ZESTRIL) 10 MG tablet Take one tablet daily . 7/8/22   Tanvir Ventura MD   sertraline (ZOLOFT) 25 MG tablet Take 1 tablet by mouth daily 7/8/22   Tanvir Ventura MD   rosuvastatin (CRESTOR) 40 MG tablet Take 1 tablet by mouth nightly 5/13/22   Sandra Herzog MD   ONETOUCH VERIO strip USE 1 STRIP TO [facility] 78 DAILY 3/30/22   Historical Provider, MD   vitamin D3 (CHOLECALCIFEROL) 125 MCG (5000 UT) TABS tablet Take 5,000 Units by mouth daily    Historical Provider, MD   aspirin 81 MG EC tablet Take 162 mg by mouth daily    Historical Provider, MD   Ascorbic Acid (VITAMIN C) 1000 MG tablet Take 1,000 mg by mouth daily    Historical Provider, MD   tadalafil (CIALIS) 20 MG tablet Take 1 tablet by mouth daily as needed for Erectile Dysfunction 4/26/22   Tanvir Ventura MD   citalopram (CELEXA) 10 MG tablet Take 1 tablet by mouth daily . Taper to d/c 4/26/22   Tanvir Ventura MD   gabapentin (NEURONTIN) 300 MG capsule Take 1 capsule by mouth 3 times daily for 90 days.  4/26/22 7/25/22  Tanvir Ventura MD   furosemide (LASIX) 20 MG tablet Take 1 tablet by mouth daily 4/26/22   Elizabeth Hernandez MD   busPIRone (BUSPAR) 15 MG tablet TAKE 1 TABLET BY MOUTH TWICE DAILY 4/26/22   Elizabeth Hernandez MD         Allergies:  Patient has no known allergies. Social History:    TOBACCO:   reports that he has never smoked. His smokeless tobacco use includes chew. ETOH:   reports that he does not currently use alcohol. Family History:    Reviewed in detail and negative for DM, CAD, Cancer, CVA. Positive as follows\"      Problem Relation Age of Onset    Diabetes Mother     Heart Attack Mother     Coronary Art Dis Father     Heart Attack Father        REVIEW OF SYSTEMS:   Pertinent positives as noted in the HPI. All other systems reviewed and negative. PHYSICAL EXAM:  BP (!) 141/75   Pulse 88   Temp 98.5 °F (36.9 °C) (Oral)   Resp 18   Ht 6' 7\" (2.007 m)   Wt 278 lb (126.1 kg)   SpO2 97%   BMI 31.32 kg/m²   General appearance: No apparent distress, appears stated age and cooperative. HEENT: Normal cephalic, atraumatic without obvious deformity. Pupils equal, round, and reactive to light. Extra ocular muscles intact. Conjunctivae/corneas clear. Neck: Supple, with full range of motion. No jugular venous distention. Trachea midline. Respiratory: CTA  Cardiovascular: RRR  Abdomen: S/NT/ND  Musculoskeletal: No clubbing, cyanosis, edema of bilateral lower extremities. Brisk capillary refill. Skin: Normal skin color. No rashes or lesions. Neurologic:  Neurovascularly intact without any focal sensory/motor deficits.  Cranial nerves: II-XII intact, grossly non-focal.  Psychiatric: Alert and oriented, thought content appropriate, normal insight    Reviewed EKG and CXR personally      CBC:   Recent Labs     08/02/22 2022   WBC 6.9   RBC 5.34   HGB 15.1   HCT 44.8   MCV 83.9   RDW 12.9        BMP:   Recent Labs     08/02/22 2022      K 4.2   CL 99   CO2 26   BUN 13   CREATININE 0.9     LFT:  Recent Labs     08/02/22 2022   PROT 8.1   ALKPHOS 90   ALT 26   AST 17   BILITOT 0.5     CE:  No results for input(s): CKTOTAL, TROPONINI in the last 72 hours. PT/INR: No results for input(s): INR, APTT in the last 72 hours. BNP: No results for input(s): BNP in the last 72 hours. ESR: No results found for: SEDRATE  CRP: No results found for: CRP  D Dimer:   Lab Results   Component Value Date    DDIMER 305 08/02/2022      Folate and B12:   Lab Results   Component Value Date    NVCUWZWN26 666 04/26/2022   , No results found for: FOLATE  Lactic Acid: No results found for: LACTA  Thyroid Studies: No results found for: TSH, Rosi Pace    Oupatient labs:  Lab Results   Component Value Date    CHOL 190 04/26/2022    TRIG 133 04/26/2022    HDL 49 04/26/2022    LDLCALC 114 (H) 04/26/2022    PSA 0.57 04/26/2022    LABA1C 10.2 04/26/2022       Urinalysis:  No results found for: NITRU, WBCUA, BACTERIA, RBCUA, BLOODU, SPECGRAV, GLUCOSEU    Imaging:  CT Head WO Contrast    Result Date: 8/2/2022  EXAMINATION: CT OF THE HEAD WITHOUT CONTRAST  8/2/2022 9:23 pm TECHNIQUE: CT of the head was performed without the administration of intravenous contrast. Automated exposure control, iterative reconstruction, and/or weight based adjustment of the mA/kV was utilized to reduce the radiation dose to as low as reasonably achievable. COMPARISON: None. HISTORY: ORDERING SYSTEM PROVIDED HISTORY: Syncopal Fall TECHNOLOGIST PROVIDED HISTORY: Reason for exam:->Syncopal Fall Has a \"code stroke\" or \"stroke alert\" been called? ->No Decision Support Exception - unselect if not a suspected or confirmed emergency medical condition->Emergency Medical Condition (MA) What reading provider will be dictating this exam?->CRC FINDINGS: BRAIN/VENTRICLES: There is no acute intracranial hemorrhage, mass effect or midline shift. No abnormal extra-axial fluid collection. The gray-white differentiation is maintained without evidence of an acute infarct. There is no evidence of hydrocephalus.  ORBITS: The visualized portion of the orbits demonstrate no acute abnormality. SINUSES: The visualized paranasal sinuses and mastoid air cells demonstrate no acute abnormality. SOFT TISSUES/SKULL:  No acute abnormality of the visualized skull or soft tissues. No acute intracranial abnormality. CT Cervical Spine WO Contrast    Addendum Date: 8/2/2022    ADDENDUM: I discussed the findings with the emergency room attending physician at approximately 10 p.m. the     Result Date: 8/2/2022  EXAMINATION: CT OF THE CERVICAL SPINE WITHOUT CONTRAST 8/2/2022 9:23 pm TECHNIQUE: CT of the cervical spine was performed without the administration of intravenous contrast. Multiplanar reformatted images are provided for review. Automated exposure control, iterative reconstruction, and/or weight based adjustment of the mA/kV was utilized to reduce the radiation dose to as low as reasonably achievable. COMPARISON: None. HISTORY: ORDERING SYSTEM PROVIDED HISTORY: Syncope Fall TECHNOLOGIST PROVIDED HISTORY: Reason for exam:->Syncope Fall Decision Support Exception - unselect if not a suspected or confirmed emergency medical condition->Emergency Medical Condition (MA) What reading provider will be dictating this exam?->CRC FINDINGS: BONES/ALIGNMENT: There is a small nondisplaced avulsion fracture along the anterior endplate of C5 which is best visualized on the sagittal sections Number 35. There is some questionable anterior soft tissue swelling at this site. No other fracture or subluxation could be identified. DEGENERATIVE CHANGES: No significant degenerative changes. SOFT TISSUES: There is questionable minimal prevertebral soft tissue swelling at the site of the nondisplaced avulsion fracture anterior aspect of C5. Small oblique nondisplaced anterior inferior endplate C5 fracture at the site of the spur formation. I doubt this is a significant fracture clinically.  Otherwise unremarkable exam.     XR CHEST PORTABLE    Result Date: 8/2/2022  EXAMINATION: ONE XRAY VIEW OF THE CHEST 8/2/2022 7:40 pm COMPARISON: None. HISTORY: ORDERING SYSTEM PROVIDED HISTORY: Syncope TECHNOLOGIST PROVIDED HISTORY: Reason for exam:->Syncope What reading provider will be dictating this exam?->CRC FINDINGS: The lungs are without acute focal process. There is no effusion or pneumothorax. The cardiomediastinal silhouette is without acute process. The osseous structures are without acute process. No acute process. CTA PULMONARY W CONTRAST    Result Date: 8/2/2022  EXAMINATION: CTA OF THE CHEST 8/2/2022 9:23 pm TECHNIQUE: CTA of the chest was performed after the administration of intravenous contrast.  Multiplanar reformatted images are provided for review. MIP images are provided for review. Automated exposure control, iterative reconstruction, and/or weight based adjustment of the mA/kV was utilized to reduce the radiation dose to as low as reasonably achievable. COMPARISON: None. HISTORY: ORDERING SYSTEM PROVIDED HISTORY: Elevated DDimer TECHNOLOGIST PROVIDED HISTORY: Reason for exam:->Elevated DDimer Decision Support Exception - unselect if not a suspected or confirmed emergency medical condition->Emergency Medical Condition (MA) What reading provider will be dictating this exam?->CRC FINDINGS: There is no acute pulmonary embolus in the main PA, right and left main PAs, in the lobar, segmental and subsegmental branches to the 3/4 order. Contrast density was target to evaluate the pulmonary artery circulation. There is no aneurysm formation or dissection of the thoracic aorta. Patient had previous CABG. Inner diameter for the left ventricular cavity is 6.2 cm and for the right ventricular cavities 4.3 cm. Calcifications are seen in the coronary arteries. There is no pericardial effusion. There is no mediastinal mass or adenopathy. Lungs are normally expanded. No infiltrates, consolidates or pleural effusions are seen.  Upper abdominal structures not fully covered on this study. What is visualized appear unremarkable. The adrenals not enlarged. Degenerative changes are seen throughout the thoracic spine with fused osteophytes and syndesmophytes. .     No evidence of pulmonary embolism or acute pulmonary abnormality. ASSESSMENT:  -Syncope and collapse  -Frequent falls  -C5 endplate fracture  -Coronary artery disease  -Hypertension  -Hyperlipidemia  -Diabetes mellitus type 2      PLAN:  -Admit to medicine  -Consult neurosurgery  -Telemetry  -Cervical collar  -Echocardiogram  -Continue home medications        Diet: No diet orders on file  Code Status: No Order  Surrogate decision maker confirmed with patient:   Extended Emergency Contact Information  Primary Emergency Contact: DANITA ALMARAZ  Address: [address] Phone: [phone]  Mobile Phone: [phone]  Relation: Ex-Spouse    DVT Prophylaxis: []Lovenox []Heparin []PCD [] [address] []Encouraged ambulation  Disposition: []Med/Surg [] Intermediate [] ICU/CCU  Admit status: [] Observation [] Inpatient     +++++++++++++++++++++++++++++++++++++++++++++++++  Lurene [place], DO  +++++++++++++++++++++++++++++++++++++++++++++++++  NOTE: This report was transcribed using voice recognition software. Every effort was made to ensure accuracy; however, inadvertent computerized transcription errors may be present.

## 2022-08-03 NOTE — PROGRESS NOTES
Orthos at 15:00  Lying 117/72 HR 79  Sitting 89/60 HR 82  Standing 65/46 HR 89    Patient states he felt light headed while standing.      15:18 Patient back in bed BP 95/63 HR 81

## 2022-08-03 NOTE — PLAN OF CARE
Patient's chart updated to reflect:      . - HF care plan, HF education points and HF discharge instructions.  -Orders: 2 gram sodium diet, daily weights, I/O.  -PCP and/or Cardiologist appointment to be scheduled within 7 days of hospital discharge.  -History of HF, not primary admission Dx.   Patient admitted for treatment of ASSESSMENT:  -Syncope and collapse  -Frequent falls  -C5 endplate fracture  -Coronary artery disease  -Hypertension  -Hyperlipidemia  -Diabetes mellitus type 2  Melyssa Fernandez RN RN, BSN  Heart Failure Navigator

## 2022-08-03 NOTE — PROGRESS NOTES
Patient moved from 8518A to room  with all belongings. 8518A heart monitor cleaned and placed in storage. Patient placed on appropriate monitor.  Patient educated on use of call light, patient verbalized understanding

## 2022-08-03 NOTE — CONSULTS
[address]                  [address] [place]                                  CONSULTATION    PATIENT NAME: Brooke Jane                    :        1970  MED REC NO:   [id number]                            ROOM:       8518  ACCOUNT NO:   [de-identified]                           ADMIT DATE: 2022  PROVIDER:     Dane Jaimes MD    CONSULT DATE:  2022    REASON FOR CONSULTATION:  C5 fracture. HISTORY OF PRESENT ILLNESS:  The patient is a 59-year-old gentleman who  presents to the hospital after he slipped and fell in the tub several  days ago. Of note, he states that he has had multiple episodes where he  just passes out and admits to some numbness, tingling, and weakness in  his arms and does occasionally get weakness in his legs. He also  complains of pain that radiates into his arms. He denies any loss of  control of bowel or bladder function. PAST MEDICAL HISTORY:  Positive for anxiety, atrial fibrillation,  coronary artery disease, cardiomyopathy, chronic kidney disease,  depression, hypertension, hyperlipidemia, bundle-branch block, and  diabetes. PAST SURGICAL HISTORY:  Positive for toe amputation, coronary artery  bypass grafting, and eyelid surgery. FAMILY HISTORY:  Positive for diabetes in his mother. SOCIAL HISTORY:  He does chew tobacco.    ALLERGIES:  He has no known drug allergies. HOME MEDICATIONS:  Include aspirin, Jardiance, and Zoloft. REVIEW OF SYSTEMS:  HEENT:  Negative for headache, double vision, blurry  vision, but positive for neck pain. CARDIOVASCULAR:  Negative for chest  pain, arrhythmia, or palpitations. RESPIRATORY:  Negative for shortness  of breath, asthma, bronchitis, or pneumonia. GASTROINTESTINAL:   Positive for constipation. GENITOURINARY:  Negative for hematuria or  dysuria. HEMATOLOGIC:  Positive for easy bruising. INFECTIOUS:   Negative for any recent infection. MUSCULOSKELETAL:  Positive for joint  stiffness and swelling. PSYCHIATRIC:  Positive for some anxiety and  depression. NEUROLOGIC:  Negative for seizure or stroke. ENDOCRINE:   Positive for diabetes. PHYSICAL EXAMINATION:  VITAL SIGNS:  He is currently afebrile with a T-current of 36.6 degrees  Celsius, pulse 72, blood pressure is 129/82, respiratory rate is 18. GENERAL:  He is resting in bed. Does not appear to be in any acute  distress. Appears his stated age. HEENT:  His head is normocephalic and atraumatic. Pupils are 3 to 2 mm  and reactive. He has no drainage out of his eyes, ears, nose, or  throat. SKIN:  His skin is warm and dry. MUSCULOSKELETAL:  He has got decreased range of motion in his bilateral  upper extremities. ABDOMEN:  Soft, nontender, nondistended. RESPIRATORY:  He is not using any accessory muscles of respiration. NEUROLOGIC:  On rest of neurologic exam, he is awake, alert, and  oriented x3. Cranial nerves II through XII are intact bilaterally. Motor exam reveals 5/5 strength in his bilateral lower extremities. He  has got 4/5 strength in his bilateral upper extremities. Sensation is  grossly intact to light touch. Reflexes are 2+ and symmetric. Toes are  going down. He has no clonus. REVIEW OF IMAGING:  He has a CT scan of his cervical spine that shows  fracture of an anterior osteophyte at C5. LABORATORY VALUES:  He has a white blood cell count of 5.9, hemoglobin  14.4, hematocrit 43.2, platelet count 009. ASSESSMENT AND PLAN:  The patient is a 72-year-old gentleman with C5  fracture and history of multiple falls with bilateral upper extremity  weakness. He is neurologically stable. Plan is to obtain an MRI of his  cervical spine to rule out spinal cord compression as the etiology of  his bilateral upper extremity weakness and his multiple falls.   He may  also need a syncopal workup and he needs to be evaluated by Neurology to  rule out drop attacks or seizures.         Conor Bah MD    D: 08/03/2022 6:34:02       T: 08/03/2022 8:18:54     [email or web address]  Job#: [id number]     Doc#: [id number]    CC:

## 2022-08-03 NOTE — PROGRESS NOTES
Hospitalist Progress Note      Synopsis: Patient admitted on 8/2/2022  46y.o. year old male  who presents to the emergency department with complaints of syncope and multiple falls. Last fall was yesterday. He states he hit his head against the bathtub. Currently complaining of chest and neck pain on the areas that he fell on. Vital signs within normal limits and stable. laboratory studies notable for glucose 159, proBNP 221, troponin 14. CT the head unremarkable. CT cervical spine shows small oblique nondisplaced anterior inferior endplate C5 fracture. CT chest was unremarkable. Neurosurgery was consulted. Cervical collar was placed in the emergency department. Medicine consulted for admission    Subjective    Clinically improving. Feeling better. Stable overnight. No other overnight issues reported. No CP, SOB, palpitations, blurred vision, HA, lightheadedness, LOC or focal neurological deficits    Exam:  /82   Pulse 72   Temp 97.8 °F (36.6 °C) (Temporal)   Resp 18   Ht 6' 7\" (2.007 m)   Wt 278 lb (126.1 kg)   SpO2 97%   BMI 31.32 kg/m²   General appearance: No apparent distress, appears stated age and cooperative. HEENT: Pupils equal, round, and reactive to light. Conjunctivae/corneas clear. Neck: Supple. No jugular venous distention. Trachea midline. Respiratory:  Normal respiratory effort. Clear to auscultation, bilaterally without Rales/Wheezes/Rhonchi. Cardiovascular: Regular rate and rhythm with normal S1/S2 without murmurs, rubs or gallops. Abdomen: Soft, non-tender, non-distended with normal bowel sounds. Musculoskeletal: No clubbing, cyanosis or edema bilaterally. Brisk capillary refill. 2+ lower extremity pulses (dorsalis pedis).    Skin:  No rashes    Neurologic: awake, alert and following commands     Medications:  Reviewed    Infusion Medications    sodium chloride       Scheduled Medications    aspirin  162 mg Oral Daily    busPIRone  15 mg Oral BID empagliflozin  25 mg Oral Daily    furosemide  20 mg Oral Daily    gabapentin  300 mg Oral TID    lisinopril  10 mg Oral Daily    insulin glargine-yfgn  60 Units SubCUTAneous QAM AC    metoprolol succinate  25 mg Oral Daily    rosuvastatin  40 mg Oral Nightly    sertraline  25 mg Oral Daily    vitamin D3  5,000 Units Oral Daily    sodium chloride flush  10 mL IntraVENous 2 times per day    enoxaparin  30 mg SubCUTAneous BID     PRN Meds: oxyCODONE-acetaminophen **AND** oxyCODONE, sodium chloride flush, sodium chloride, promethazine **OR** ondansetron, polyethylene glycol, acetaminophen **OR** acetaminophen, perflutren lipid microspheres    I/O  No intake or output data in the 24 hours ending 08/03/22 0753    Labs:   Recent Labs     08/02/22 2022 08/03/22  0542   WBC 6.9 5.9   HGB 15.1 14.4   HCT 44.8 43.2    149       Recent Labs     08/02/22 2022 08/03/22  0542    135   K 4.2 4.0   CL 99 97*   CO2 26 27   BUN 13 16   CREATININE 0.9 1.0   CALCIUM 10.0 9.5       Recent Labs     08/02/22 2022 08/03/22  0542   PROT 8.1 7.6   ALKPHOS 90 85   ALT 26 24   AST 17 17   BILITOT 0.5 0.5       No results for input(s): INR in the last 72 hours. No results for input(s): Candis Dross in the last 72 hours. Chronic labs:  Lab Results   Component Value Date    CHOL 190 04/26/2022    TRIG 133 04/26/2022    HDL 49 04/26/2022    LDLCALC 114 (H) 04/26/2022    PSA 0.57 04/26/2022    LABA1C 10.2 04/26/2022       Radiology:  Imaging studies reviewed today. ASSESSMENT:    Principal Problem:    Syncope and collapse  Active Problems:    Type 2 diabetes mellitus with diabetic polyneuropathy, with long-term current use of insulin (HCC)    CAD, multiple vessel    Essential hypertension    Ischemic cardiomyopathy    Closed nondisplaced fracture of fifth cervical vertebra (HCC)    Frequent falls    Chronic systolic CHF (congestive heart failure) ([facility] [address]  Resolved Problems:    * No resolved hospital problems.  * PLAN:  Syncope  -Admit to medicine  Pain control  Bowel regimen  Orthostatics positive, repeat  -Telemetry  EKG reviewed-normal sinus rhythm, PVCs, nonischemic ST-T wave abnormalities-not significantly changed from recent reviewed EKG    C5 endplate fracture  -Cervical collar  -Echocardiogram  -Consult neurosurgery    Dm2 unc  Add MBS, SSI, A1c   -Continue home medications    HTN  Medications Monitor BP and labs. Chronic systolic CHF/ischemic cardiomyopathy/history CAD  P.o. Lasix  ASA  Monitor I's and O's  Daily weights  Monitor labs closely   Echo     Diet: ADULT DIET; Regular  Code Status: Full Code  PT/OT Eval Status:     DVT Prophylaxis:     Recommended disposition at discharge:    Anticipate home 24 to 48 hours  +++++++++++++++++++++++++++++++++++++++++++++++++  Ashley Chua MD   [facility].  +++++++++++++++++++++++++++++++++++++++++++++++++  NOTE: This report was transcribed using voice recognition software. Every effort was made to ensure accuracy; however, inadvertent computerized transcription errors may be present.

## 2022-08-03 NOTE — CARE COORDINATION
Patient presented to ED a day after falling and hitting his head on his bath tub. H/o multiple falls with bue. Neurosurgery consulted. CT cervical spine showed C5 fracture. For MRI. PT/OT ordered. Met with patient at bedside to  discuss transition of care. Patient lives alone in a 1 story home with laundry facilities in basement. His PCP is Dr Tracie Perez. At this time, he anticipates no needs at discharge but if therapy recommended would prefer outpatient. His son will transport home when medically cleared.  CM/SW will continue to follow

## 2022-08-03 NOTE — PROCEDURES
Requested more information via call/fax from [facility] regarding the patients implanted atrial clip, there was no implant/MRI info on the card faxed, could not find info in chart either.

## 2022-08-03 NOTE — ED NOTES
Pt states he has right sides chest pain that goes down his R arm and his R hand and fingers feel numb. Pt had a bypass heart surgery Dec 2020  and he just wanted to make sure it is note related to the surgery. Pt resp even and unlabored A&Ox4  Pt also stating that is has had a few falls recently. Yesterday pt went to bathroom to urinate and woke up on his bathroom floor. Pt states he hit his head and right side of ribs on bathroom tub. Pt has abrasion above right eye. Pt + for LOC but by the time EMS came yesterday he was feeling better so he did not come to ED.  Pt came today because of worsening pain and weakness to R arm and chest.      Rosario Rouse RN  08/02/22 2049       Rosario Rouse RN  08/02/22 2105

## 2022-08-04 ENCOUNTER — APPOINTMENT (OUTPATIENT)
Dept: MRI IMAGING | Age: 52
DRG: 347 | End: 2022-08-04
Payer: COMMERCIAL

## 2022-08-04 LAB
HBA1C MFR BLD: 9.4 % (ref 4–5.6)
METER GLUCOSE: 160 MG/DL (ref 74–99)
METER GLUCOSE: 172 MG/DL (ref 74–99)
METER GLUCOSE: 222 MG/DL (ref 74–99)
METER GLUCOSE: 270 MG/DL (ref 74–99)

## 2022-08-04 PROCEDURE — S5553 INSULIN LONG ACTING 5 U: HCPCS | Performed by: FAMILY MEDICINE

## 2022-08-04 PROCEDURE — 6360000002 HC RX W HCPCS: Performed by: INTERNAL MEDICINE

## 2022-08-04 PROCEDURE — 2060000000 HC ICU INTERMEDIATE R&B

## 2022-08-04 PROCEDURE — 2580000003 HC RX 258: Performed by: FAMILY MEDICINE

## 2022-08-04 PROCEDURE — 82962 GLUCOSE BLOOD TEST: CPT

## 2022-08-04 PROCEDURE — 36415 COLL VENOUS BLD VENIPUNCTURE: CPT

## 2022-08-04 PROCEDURE — 72141 MRI NECK SPINE W/O DYE: CPT

## 2022-08-04 PROCEDURE — 83036 HEMOGLOBIN GLYCOSYLATED A1C: CPT

## 2022-08-04 PROCEDURE — 6370000000 HC RX 637 (ALT 250 FOR IP): Performed by: FAMILY MEDICINE

## 2022-08-04 PROCEDURE — 6360000002 HC RX W HCPCS: Performed by: FAMILY MEDICINE

## 2022-08-04 PROCEDURE — 6370000000 HC RX 637 (ALT 250 FOR IP)

## 2022-08-04 RX ORDER — MIDAZOLAM HYDROCHLORIDE 2 MG/2ML
1 INJECTION, SOLUTION INTRAMUSCULAR; INTRAVENOUS ONCE
Status: COMPLETED | OUTPATIENT
Start: 2022-08-04 | End: 2022-08-04

## 2022-08-04 RX ADMIN — MIDAZOLAM 1 MG: 1 INJECTION INTRAMUSCULAR; INTRAVENOUS at 18:46

## 2022-08-04 RX ADMIN — ROSUVASTATIN 40 MG: 20 TABLET, FILM COATED ORAL at 20:40

## 2022-08-04 RX ADMIN — OXYCODONE 2.5 MG: 5 TABLET ORAL at 20:41

## 2022-08-04 RX ADMIN — ASPIRIN 162 MG: 81 TABLET, COATED ORAL at 10:09

## 2022-08-04 RX ADMIN — BUSPIRONE HYDROCHLORIDE 15 MG: 7.5 TABLET ORAL at 20:40

## 2022-08-04 RX ADMIN — INSULIN GLARGINE-YFGN 60 UNITS: 100 INJECTION, SOLUTION SUBCUTANEOUS at 06:25

## 2022-08-04 RX ADMIN — GABAPENTIN 300 MG: 300 CAPSULE ORAL at 10:09

## 2022-08-04 RX ADMIN — ENOXAPARIN SODIUM 30 MG: 100 INJECTION SUBCUTANEOUS at 20:40

## 2022-08-04 RX ADMIN — SERTRALINE HYDROCHLORIDE 25 MG: 25 TABLET ORAL at 10:09

## 2022-08-04 RX ADMIN — ENOXAPARIN SODIUM 30 MG: 100 INJECTION SUBCUTANEOUS at 10:07

## 2022-08-04 RX ADMIN — Medication 5000 UNITS: at 10:08

## 2022-08-04 RX ADMIN — BUSPIRONE HYDROCHLORIDE 15 MG: 7.5 TABLET ORAL at 10:08

## 2022-08-04 RX ADMIN — GABAPENTIN 300 MG: 300 CAPSULE ORAL at 13:47

## 2022-08-04 RX ADMIN — METOPROLOL SUCCINATE 25 MG: 25 TABLET, EXTENDED RELEASE ORAL at 10:08

## 2022-08-04 RX ADMIN — GABAPENTIN 300 MG: 300 CAPSULE ORAL at 20:41

## 2022-08-04 RX ADMIN — OXYCODONE AND ACETAMINOPHEN 1 TABLET: 5; 325 TABLET ORAL at 04:04

## 2022-08-04 RX ADMIN — SODIUM CHLORIDE, PRESERVATIVE FREE 10 ML: 5 INJECTION INTRAVENOUS at 20:40

## 2022-08-04 RX ADMIN — INSULIN LISPRO 8 UNITS: 100 INJECTION, SOLUTION INTRAVENOUS; SUBCUTANEOUS at 12:07

## 2022-08-04 RX ADMIN — EMPAGLIFLOZIN 25 MG: 10 TABLET, FILM COATED ORAL at 10:09

## 2022-08-04 RX ADMIN — OXYCODONE 2.5 MG: 5 TABLET ORAL at 04:04

## 2022-08-04 RX ADMIN — OXYCODONE AND ACETAMINOPHEN 1 TABLET: 5; 325 TABLET ORAL at 10:07

## 2022-08-04 RX ADMIN — OXYCODONE AND ACETAMINOPHEN 1 TABLET: 5; 325 TABLET ORAL at 20:41

## 2022-08-04 RX ADMIN — INSULIN LISPRO 4 UNITS: 100 INJECTION, SOLUTION INTRAVENOUS; SUBCUTANEOUS at 17:29

## 2022-08-04 RX ADMIN — OXYCODONE 2.5 MG: 5 TABLET ORAL at 10:08

## 2022-08-04 ASSESSMENT — PAIN SCALES - GENERAL
PAINLEVEL_OUTOF10: 8
PAINLEVEL_OUTOF10: 9
PAINLEVEL_OUTOF10: 9
PAINLEVEL_OUTOF10: 4

## 2022-08-04 NOTE — PROGRESS NOTES
Occupational Therapy  OT SESSION ATTEMPT     Date:2022  Patient Name: Virgie Munoz  MRN: [id number]  : 1970  Room: [address] [place], [place] [address]     Attempted OT session this date:    [] unavailable due to other medical staff currently with pt   [x] on hold - await MRI c-spine and neurosurgery recommendations/POC   [] on hold per nursing staff secondary to lab / radiology results    [] declined treatment  this date due to ____. Benefits of participation in therapy reviewed with pt.    [] off unit   [] Other:     Will reattempt OT eval at a later time.     DREW YbarraR/L #[id number]

## 2022-08-04 NOTE — PROGRESS NOTES
Call placed to [facility] regarding information on pt atrial clip-stated they would fax info.    Info faxed to MRI

## 2022-08-04 NOTE — PROGRESS NOTES
Date: 2022       Patient Name: Cinthia Resendez  : 1970      MRN: [id number]    PT order received and chart reviewed. PT evaluation held pending neurosurgery recommendations.   Will follow up as appropriate    Emilee Lee PT, DPT  [id number]

## 2022-08-04 NOTE — PROGRESS NOTES
Hospitalist Progress Note      Synopsis: Patient admitted on 8/2/2022  46y.o. year old male  who presents to the emergency department with complaints of syncope and multiple falls. Last fall was yesterday. He states he hit his head against the bathtub. Currently complaining of chest and neck pain on the areas that he fell on. Vital signs within normal limits and stable. laboratory studies notable for glucose 159, proBNP 221, troponin 14. CT the head unremarkable. CT cervical spine shows small oblique nondisplaced anterior inferior endplate C5 fracture. CT chest was unremarkable. Neurosurgery was consulted. Cervical collar was placed in the emergency department. Medicine consulted for admission    Subjective    Clinically improving. Feeling better. Stable overnight. No other overnight issues reported. No CP, SOB, palpitations, blurred vision, HA, lightheadedness, LOC or focal neurological deficits    Exam:  /72   Pulse 74   Temp 98.2 °F (36.8 °C) (Oral)   Resp 18   Ht 6' 7\" (2.007 m)   Wt 269 lb (122 kg)   SpO2 96%   BMI 30.30 kg/m²   General appearance: No apparent distress, appears stated age and cooperative. HEENT: Pupils equal, round, and reactive to light. Conjunctivae/corneas clear. Neck: Supple. No jugular venous distention. Trachea midline. Respiratory:  Normal respiratory effort. Clear to auscultation, bilaterally without Rales/Wheezes/Rhonchi. Cardiovascular: Regular rate and rhythm with normal S1/S2 without murmurs, rubs or gallops. Abdomen: Soft, non-tender, non-distended with normal bowel sounds. Musculoskeletal: No clubbing, cyanosis or edema bilaterally. Brisk capillary refill. 2+ lower extremity pulses (dorsalis pedis).    Skin:  No rashes    Neurologic: awake, alert and following commands     Medications:  Reviewed    Infusion Medications    dextrose      sodium chloride       Scheduled Medications    insulin lispro  0-16 Units SubCUTAneous TID WC    insulin nondisplaced fracture of fifth cervical vertebra (HCC)    Frequent falls    Chronic systolic CHF (congestive heart failure) ([address]  Resolved Problems:    * No resolved hospital problems. *       PLAN:  Syncope  -Admit to medicine  Pain control  Bowel regimen  Orthostatics positive, repeat  IV fluid resuscitation  Hold antihypertensives  SRAVANTHI hose  -Telemetry  EKG reviewed-normal sinus rhythm, PVCs, nonischemic ST-T wave abnormalities-not significantly changed from recent reviewed EKG  Pt with significant orthostasis as likely etiology of syncope. History not consistent with seizure      C5 endplate fracture  -Cervical collar  -Echocardiogram  -Consult neurosurgery-- Consult appreciated-plan for MRI cspine    Dm2 unc  Add MBS, SSI, A1c   -Continue home medications    HTN hold medications Monitor BP and labs. Chronic systolic CHF/ischemic cardiomyopathy/history CAD-EF is recovered to 50 to 55%  P.o. Lasix  ASA  Monitor I's and O's  Daily weights  Monitor labs closely   Echo:Ejection fraction is visually estimated at 50-55%. No regional wall motion abnormalities seen. Normal right ventricle structure and function. Physiologic and/or trace mitral regurgitation is present. No evidence for hemodynamically significant pericardial effusion    Diet: ADULT DIET; Regular; Low Sodium (2 gm)  Code Status: Full Code  PT/OT Eval Status:     DVT Prophylaxis:     Recommended disposition at discharge:    Anticipate home 24 to 48 hours  +++++++++++++++++++++++++++++++++++++++++++++++++  Toni Song MD   [facility].  +++++++++++++++++++++++++++++++++++++++++++++++++  NOTE: This report was transcribed using voice recognition software. Every effort was made to ensure accuracy; however, inadvertent computerized transcription errors may be present.

## 2022-08-04 NOTE — PROCEDURES
Obtained operative report for atrial clip, unfortunately there is no name or model number specified to confirm MRI eligibility. Unable to proceed with MRI at this time. Remains on hold. Notified RN to see if they can further investigate.

## 2022-08-05 VITALS
SYSTOLIC BLOOD PRESSURE: 165 MMHG | TEMPERATURE: 96 F | HEIGHT: 78 IN | DIASTOLIC BLOOD PRESSURE: 110 MMHG | WEIGHT: 269 LBS | OXYGEN SATURATION: 99 % | RESPIRATION RATE: 15 BRPM | BODY MASS INDEX: 31.12 KG/M2 | HEART RATE: 70 BPM

## 2022-08-05 PROBLEM — I95.1 ORTHOSTATIC HYPOTENSION: Status: ACTIVE | Noted: 2022-08-05

## 2022-08-05 LAB
METER GLUCOSE: 165 MG/DL (ref 74–99)
METER GLUCOSE: 182 MG/DL (ref 74–99)
METER GLUCOSE: 184 MG/DL (ref 74–99)
METER GLUCOSE: 188 MG/DL (ref 74–99)
METER GLUCOSE: 252 MG/DL (ref 74–99)

## 2022-08-05 PROCEDURE — 97535 SELF CARE MNGMENT TRAINING: CPT

## 2022-08-05 PROCEDURE — 2580000003 HC RX 258: Performed by: FAMILY MEDICINE

## 2022-08-05 PROCEDURE — 6370000000 HC RX 637 (ALT 250 FOR IP)

## 2022-08-05 PROCEDURE — 97530 THERAPEUTIC ACTIVITIES: CPT

## 2022-08-05 PROCEDURE — 97165 OT EVAL LOW COMPLEX 30 MIN: CPT

## 2022-08-05 PROCEDURE — 6370000000 HC RX 637 (ALT 250 FOR IP): Performed by: FAMILY MEDICINE

## 2022-08-05 PROCEDURE — 97161 PT EVAL LOW COMPLEX 20 MIN: CPT

## 2022-08-05 PROCEDURE — S5553 INSULIN LONG ACTING 5 U: HCPCS | Performed by: FAMILY MEDICINE

## 2022-08-05 PROCEDURE — 6360000002 HC RX W HCPCS: Performed by: FAMILY MEDICINE

## 2022-08-05 PROCEDURE — 82962 GLUCOSE BLOOD TEST: CPT

## 2022-08-05 PROCEDURE — 99232 SBSQ HOSP IP/OBS MODERATE 35: CPT | Performed by: NEUROLOGICAL SURGERY

## 2022-08-05 RX ORDER — OXYCODONE HYDROCHLORIDE AND ACETAMINOPHEN 5; 325 MG/1; MG/1
1 TABLET ORAL EVERY 6 HOURS PRN
Qty: 20 TABLET | Refills: 0 | Status: SHIPPED | OUTPATIENT
Start: 2022-08-05 | End: 2022-08-10

## 2022-08-05 RX ORDER — SENNA AND DOCUSATE SODIUM 50; 8.6 MG/1; MG/1
2 TABLET, FILM COATED ORAL 2 TIMES DAILY PRN
Qty: 50 TABLET | Refills: 0 | Status: SHIPPED | OUTPATIENT
Start: 2022-08-05

## 2022-08-05 RX ADMIN — ASPIRIN 162 MG: 81 TABLET, COATED ORAL at 09:20

## 2022-08-05 RX ADMIN — INSULIN GLARGINE-YFGN 60 UNITS: 100 INJECTION, SOLUTION SUBCUTANEOUS at 05:49

## 2022-08-05 RX ADMIN — OXYCODONE AND ACETAMINOPHEN 1 TABLET: 5; 325 TABLET ORAL at 04:49

## 2022-08-05 RX ADMIN — INSULIN LISPRO 8 UNITS: 100 INJECTION, SOLUTION INTRAVENOUS; SUBCUTANEOUS at 18:58

## 2022-08-05 RX ADMIN — BUSPIRONE HYDROCHLORIDE 15 MG: 7.5 TABLET ORAL at 09:20

## 2022-08-05 RX ADMIN — ENOXAPARIN SODIUM 30 MG: 100 INJECTION SUBCUTANEOUS at 09:20

## 2022-08-05 RX ADMIN — GABAPENTIN 300 MG: 300 CAPSULE ORAL at 09:19

## 2022-08-05 RX ADMIN — GABAPENTIN 300 MG: 300 CAPSULE ORAL at 20:02

## 2022-08-05 RX ADMIN — GABAPENTIN 300 MG: 300 CAPSULE ORAL at 14:40

## 2022-08-05 RX ADMIN — METOPROLOL SUCCINATE 25 MG: 25 TABLET, EXTENDED RELEASE ORAL at 09:19

## 2022-08-05 RX ADMIN — SODIUM CHLORIDE, PRESERVATIVE FREE 10 ML: 5 INJECTION INTRAVENOUS at 09:21

## 2022-08-05 RX ADMIN — ROSUVASTATIN 40 MG: 20 TABLET, FILM COATED ORAL at 20:02

## 2022-08-05 RX ADMIN — EMPAGLIFLOZIN 25 MG: 10 TABLET, FILM COATED ORAL at 09:19

## 2022-08-05 RX ADMIN — Medication 5000 UNITS: at 09:19

## 2022-08-05 RX ADMIN — BUSPIRONE HYDROCHLORIDE 15 MG: 7.5 TABLET ORAL at 20:03

## 2022-08-05 RX ADMIN — SERTRALINE HYDROCHLORIDE 25 MG: 25 TABLET ORAL at 09:20

## 2022-08-05 RX ADMIN — OXYCODONE 2.5 MG: 5 TABLET ORAL at 04:50

## 2022-08-05 ASSESSMENT — PAIN SCALES - GENERAL
PAINLEVEL_OUTOF10: 0
PAINLEVEL_OUTOF10: 0

## 2022-08-05 NOTE — PROGRESS NOTES
Hospitalist Progress Note      Synopsis: Patient admitted on 8/2/2022  46y.o. year old male  who presents to the emergency department with complaints of syncope and multiple falls. Last fall was yesterday. He states he hit his head against the bathtub. Currently complaining of chest and neck pain on the areas that he fell on. Vital signs within normal limits and stable. laboratory studies notable for glucose 159, proBNP 221, troponin 14. CT the head unremarkable. CT cervical spine shows small oblique nondisplaced anterior inferior endplate C5 fracture. CT chest was unremarkable. Neurosurgery was consulted. Cervical collar was placed in the emergency department. Medicine consulted for admission    Subjective    Clinically improving. Feeling better. Stable overnight. No other overnight issues reported. No CP, SOB, palpitations, blurred vision, HA, lightheadedness, LOC or focal neurological deficits    Exam:  /89   Pulse 79   Temp 97.6 °F (36.4 °C) (Temporal)   Resp 18   Ht 6' 7\" (2.007 m)   Wt 269 lb (122 kg)   SpO2 99%   BMI 30.30 kg/m²   General appearance: No apparent distress, appears stated age and cooperative. HEENT: Forehead hematoma with severe appearing periorbital ecchymosis pupils equal, round, and reactive to light. Conjunctivae/corneas clear. Neck: Supple. No jugular venous distention. Trachea midline. Respiratory:  Normal respiratory effort. Clear to auscultation, bilaterally without Rales/Wheezes/Rhonchi. Cardiovascular: Regular rate and rhythm with normal S1/S2 without murmurs, rubs or gallops. Abdomen: Soft, non-tender, non-distended with normal bowel sounds. Musculoskeletal: No clubbing, cyanosis or edema bilaterally. Brisk capillary refill. 2+ lower extremity pulses (dorsalis pedis).    Skin:  No rashes    Neurologic: awake, alert and following commands     Medications:  Reviewed    Infusion Medications    dextrose      sodium chloride       Scheduled Medications    insulin lispro  0-16 Units SubCUTAneous TID     insulin lispro  0-4 Units SubCUTAneous Nightly    aspirin  162 mg Oral Daily    busPIRone  15 mg Oral BID    empagliflozin  25 mg Oral Daily    [Held by provider] furosemide  20 mg Oral Daily    gabapentin  300 mg Oral TID    [Held by provider] lisinopril  10 mg Oral Daily    insulin glargine-yfgn  60 Units SubCUTAneous QAM AC    metoprolol succinate  25 mg Oral Daily    rosuvastatin  40 mg Oral Nightly    sertraline  25 mg Oral Daily    vitamin D3  5,000 Units Oral Daily    sodium chloride flush  10 mL IntraVENous 2 times per day    enoxaparin  30 mg SubCUTAneous BID     PRN Meds: oxyCODONE-acetaminophen **AND** oxyCODONE, glucose, dextrose bolus **OR** dextrose bolus, glucagon (rDNA), dextrose, sodium chloride flush, sodium chloride, promethazine **OR** ondansetron, polyethylene glycol, acetaminophen **OR** acetaminophen    I/O    Intake/Output Summary (Last 24 hours) at 8/5/2022 0821  Last data filed at 8/4/2022 1453  Gross per 24 hour   Intake 2420.11 ml   Output --   Net 2420.11 ml       Labs:   Recent Labs     08/02/22 2022 08/03/22  0542   WBC 6.9 5.9   HGB 15.1 14.4   HCT 44.8 43.2    149         Recent Labs     08/02/22 2022 08/03/22  0542    135   K 4.2 4.0   CL 99 97*   CO2 26 27   BUN 13 16   CREATININE 0.9 1.0   CALCIUM 10.0 9.5         Recent Labs     08/02/22 2022 08/03/22  0542   PROT 8.1 7.6   ALKPHOS 90 85   ALT 26 24   AST 17 17   BILITOT 0.5 0.5         No results for input(s): INR in the last 72 hours. No results for input(s): Magdalene Negrete in the last 72 hours. Chronic labs:  Lab Results   Component Value Date    CHOL 190 04/26/2022    TRIG 133 04/26/2022    HDL 49 04/26/2022    LDLCALC 114 (H) 04/26/2022    PSA 0.57 04/26/2022    LABA1C 9.4 (H) 08/04/2022       Radiology:  Imaging studies reviewed today.     ASSESSMENT:    Principal Problem:    Syncope and collapse  Active Problems:    Type 2 diabetes mellitus with diabetic polyneuropathy, with long-term current use of insulin (HCC)    CAD, multiple vessel    Essential hypertension    Ischemic cardiomyopathy    Closed nondisplaced fracture of fifth cervical vertebra (HCC)    Frequent falls    Chronic systolic CHF (congestive heart failure) ([facility] [address]  Resolved Problems:    * No resolved hospital problems. *       PLAN:  Syncope-suspected secondary to orthostasis-suspect component of autonomic insufficiency  Repeat orthostatic vital signs after placement of SRAVANTHI hose. Discussed techniques to minimize fall risk. Rising slowly. Waiting before moving. Using assist device. Wearing SRAVANTHI hose. Medications adjusted. Advised increased water intake, less soda. -Admit to medicine  Pain control  Bowel regimen  Orthostatics positive, repeat  IV fluid resuscitation  Hold antihypertensives  SRAVANTHI hose  -Telemetry  EKG reviewed-normal sinus rhythm, PVCs, nonischemic ST-T wave abnormalities-not significantly changed from recent reviewed EKG  Pt with significant orthostasis as likely etiology of syncope. History not consistent with seizure      C5 endplate fracture  -Custom cervical collar  -Echocardiogram  -Consult neurosurgery-- Consult appreciated-plan for MRI cspine  C-spine demonstrating myelomalacia with high-grade canal stenosis at multiple levels    Frontal scalp contusion with periorbital ecchymosis  Developing periorbital ecchymosis  Conservative management    Dm2 unc  Add MBS, SSI, A1c   -Continue home medications    HTN hold medications Monitor BP and labs. -Held    Chronic systolic CHF/ischemic cardiomyopathy/history CAD-EF is recovered to 50 to 55%  P.o. Lasix-held  ASA  Monitor I's and O's  Daily weights  Monitor labs closely   Echo:Ejection fraction is visually estimated at 50-55%. No regional wall motion abnormalities seen. Normal right ventricle structure and function. Physiologic and/or trace mitral regurgitation is present.    No evidence for hemodynamically

## 2022-08-05 NOTE — PROGRESS NOTES
Occupational Therapy  OCCUPATIONAL THERAPY INITIAL EVALUATION     [employer] [id number] [place] [address]  [address] [facility], [facility]                                                Patient Name: Radha Perez  MRN: [id number]  : 1970  Room: 8507/8507-[address]    Referring Provider: Noa Gerardo MD  Specific Provider Orders/Date: OT eval and treat 22     Diagnosis: Syncope and collapse [R55]  Closed nondisplaced fracture of fifth cervical vertebra, unspecified fracture morphology, initial encounter ([facility] [S12.401A]   Pt admitted to hospital on 22 for dizziness/lightheadedness, multiple falls      Pertinent Medical History:  has a past medical history of Anxiety, Atrial fibrillation, unspecified type ([facility] CAD (coronary artery disease), Cardiomyopathy ([facility] CKD (chronic kidney disease), Depression, HTN (hypertension), Hyperlipidemia, LBBB (left bundle branch block), and Type 2 diabetes mellitus without complication ([facility]        Precautions:  Fall Risk, R UE coordination deficits     Assessment of current deficits    [x] Functional mobility  [x]ADLs  [x] Strength               []Cognition    [x] Functional transfers   [x] IADLs         [x] Safety Awareness   [x]Endurance    [] Fine Coordination              [x] Balance      [] Vision/perception   []Sensation     []Gross Motor Coordination  [] ROM  [] Delirium                   [] Motor Control     OT PLAN OF CARE   OT POC based on physician orders, patient diagnosis and results of clinical assessment    Frequency/Duration 1-3 days/wk for 2 weeks PRN   Specific OT Treatment Interventions to include:   * Instruction/training on adapted ADL techniques and AE recommendations to increase functional independence within precautions       * Training on energy conservation strategies, correct breathing pattern and techniques to improve independence/tolerance for self-care routine  * Functional transfer/mobility training/DME recommendations for increased independence, safety, and fall prevention  * Patient/Family education to increase follow through with safety techniques and functional independence  * Recommendation of environmental modifications for increased safety with functional transfers/mobility and ADLs  * Therapeutic exercise to improve motor endurance, ROM, and functional strength for ADLs/functional transfers  * Therapeutic activities to facilitate/challenge dynamic balance, stand tolerance for increased safety and independence with ADLs  * Therapeutic activities to facilitate gross/fine motor skills for increased independence with ADLs    Recommended Adaptive Equipment: TBD     Home Living: Pt lives alone in 1 floor home. 2 THOMAS, 1 handrail  Basement laundry - 4+8 stairs, 1 handrail   Bathroom setup: tub/shower    Equipment owned: n/a    Prior Level of Function: independent with ADLs , independent with IADLs; ambulated independently w/o AD   Driving: yes   Occupation: on disability    Pain Level: Pt c/o neck radiating to B UE's (R>L) this session ; reinforced management strategies    Cognition: A&O: 4/4; Follows 1-2 step directions   Memory:  good    Sequencing:  good    Problem solving:  fair    Judgement/safety:  fair      Functional Assessment:  AM-PAC Daily Activity Raw Score: 18/24   Initial Eval Status  Date: 8/5/22 Treatment Status  Date: STGs = LTGs  Time frame: 10-14 days   Feeding Supervision  D/t R UE coordination deficits  -   Grooming Stand by Assist   Standing at sink  Modified [place]    UB Dressing Stand by Assist   Modified [place]    LB Dressing Minimal Assist   B shoes  Modified [place]    Bathing Minimal Assist  Modified [place]    Toileting Stand by Assist   Modified [place]    Bed Mobility  Supine to sit:  Independent   Sit to supine: Independent   NT   Functional Transfers Stand by Assist   Modified [place] Functional Mobility Stand by Assist   Modified [place]    Balance Sitting:     Static:  Independent    Dynamic:Supervision  Standing: SBA, no AD     Activity Tolerance Fair  Good   Visual/  Perceptual Glasses: yes  WFL  R ecchymosis                  Hand Dominance R   AROM (PROM) Strength Additional Info:    RUE  WFL Grossly: 4-/5 Fair   and fair-  FMC/dexterity noted during ADL tasks    Finger opp: fair-     LUE WFL WFL  Noted during functional tasks good  and wfl FMC/dexterity noted during ADL tasks       Hearing: WFL   Sensation:  c/o numbness/tingling B UE's  Tone: WFL   Edema: R orbital    Comments: Upon arrival patient lying in bed. Therapist educated pt on role of OT. RN clearance. At end of session, patient seated in chair with call light and phone within reach, all lines and tubes intact. Overall patient demonstrated decreased independence and safety during completion of ADL/functional transfer/mobility tasks. Pt would benefit from continued skilled OT to increase safety and independence with completion of ADL/IADL tasks for functional independence and quality of life. Treatment: OT treatment provided this date includes: Facilitation of bed mobility, unsupported sitting balance (addressing posture, safety, weight shifting, dynamic reaching to prep for ADL's), functional transfers (various surfaces w/ education/cues for safety/hand placement), standing tolerance tasks (addressing posture, balance and activity tolerance while incorporating light functional reaching impacting ADLs and functional activity) and functional ambulation tasks (including to/ from bathroom and item retrieval tasks w/ education/skilled cuing on hand placement, posture, body mechanics, energy conservation techniques and safety).   Therapist facilitated self-care retraining: UB/LB self-care tasks, toileting task and standing grooming tasks while educating pt on modified techniques, posture, safety and energy

## 2022-08-05 NOTE — FLOWSHEET NOTE
08/05/22 1600   Vital Signs   Orthostatic B/P and Pulse?  Yes   Blood Pressure Lying 133/75   Pulse Lying 78 PER MINUTE   Blood Pressure Sitting 136/76   Pulse Sitting 82 PER MINUTE   Blood Pressure Standing 92/61   Pulse Standing 91 PER MINUTE       With SRAVANTHI hose on

## 2022-08-05 NOTE — PROGRESS NOTES
Messaged Dr. Taylor Treviño about patient's questions and concerns about his bilateral eye bruising and possible concussion symptoms. He wants to speak with Dr. Taylor Treviño and make sure it is safe for him to leave. Will continue to monitor patient.

## 2022-08-05 NOTE — PROGRESS NOTES
Physician Progress Note      Ronal Rowe  YESSI #:                  [id number]  :                       1970  ADMIT DATE:       2022 7:45 PM  DISCH DATE:  RESPONDING  PROVIDER #:        Zoe HILARIO DO        QUERY TEXT:    Stage of Chronic Kidney Disease: Please provide further specificity, if known. Clinical indicators include: ckd (chronic kidney disease, bun, creatinine,   gfr, chronic kidney disease, probnp, bnp  Options provided:  -- Chronic kidney disease stage 1  -- Chronic kidney disease stage 2  -- Chronic kidney disease stage 3  -- Chronic kidney disease stage 3a  -- Chronic kidney disease stage 3b  -- Chronic kidney disease stage 4  -- Chronic kidney disease stage 5  -- Chronic kidney disease stage 5, requiring dialysis  -- End stage renal disease  -- Other - I will add my own diagnosis  -- Disagree - Not applicable / Not valid  -- Disagree - Clinically Unable to determine / Unknown        PROVIDER RESPONSE TEXT:    Provider was unable to determine a response for this query.       Electronically signed by:  Chung Shah DO 2022 5:54 AM

## 2022-08-05 NOTE — PROGRESS NOTES
Department of Neurosurgery  Progress Note    CHIEF COMPLAINT: neck pain     SUBJECTIVE:  Sitting up in chair. No collar currently on. States his arm weakness and numbness is improving. REVIEW OF SYSTEMS :  Constitutional: Negative for chills and fever. Neurological: Negative for dizziness, tremors and speech change.      OBJECTIVE:   VITALS:  BP (!) 140/80   Pulse 74   Temp 97.6 °F (36.4 °C) (Temporal)   Resp 18   Ht 6' 7\" (2.007 m)   Wt 269 lb (122 kg)   SpO2 97%   BMI 30.30 kg/m²     PHYSICAL:  Neurologic: Alert and oriented x3; PERRL  Motor Exam:  Motor exam is 4/5 BUE, 5/5 BLE  Sensory:  Decreased sensation BUE        DATA:  CBC:   Lab Results   Component Value Date/Time    WBC 5.9 08/03/2022 05:42 AM    RBC 4.98 08/03/2022 05:42 AM    HGB 14.4 08/03/2022 05:42 AM    HCT 43.2 08/03/2022 05:42 AM    MCV 86.7 08/03/2022 05:42 AM    MCH 28.9 08/03/2022 05:42 AM    MCHC 33.3 08/03/2022 05:42 AM    RDW 12.9 08/03/2022 05:42 AM     08/03/2022 05:42 AM    MPV 9.7 08/03/2022 05:42 AM     BMP:    Lab Results   Component Value Date/Time     08/03/2022 05:42 AM    K 4.0 08/03/2022 05:42 AM    CL 97 08/03/2022 05:42 AM    CO2 27 08/03/2022 05:42 AM    BUN 16 08/03/2022 05:42 AM    LABALBU 4.6 08/03/2022 05:42 AM    CREATININE 1.0 08/03/2022 05:42 AM    CALCIUM 9.5 08/03/2022 05:42 AM    GFRAA >60 08/03/2022 05:42 AM    LABGLOM >60 08/03/2022 05:42 AM    GLUCOSE 174 08/03/2022 05:42 AM     PT/INR:  No results found for: PROTIME, INR  PTT:  No results found for: APTT, PTT[APTT}    Current Inpatient Medications  Current Facility-Administered Medications: oxyCODONE-acetaminophen (PERCOCET) 5-325 MG per tablet 1 tablet, 1 tablet, Oral, Q6H PRN **AND** oxyCODONE (ROXICODONE) immediate release tablet 2.5 mg, 2.5 mg, Oral, Q6H PRN  glucose chewable tablet 16 g, 4 tablet, Oral, PRN  dextrose bolus 10% 125 mL, 125 mL, IntraVENous, PRN **OR** dextrose bolus 10% 250 mL, 250 mL, IntraVENous, PRN  glucagon (rDNA) injection 1 mg, 1 mg, SubCUTAneous, PRN  dextrose 10 % infusion, , IntraVENous, Continuous PRN  insulin lispro (HUMALOG) injection vial 0-16 Units, 0-16 Units, SubCUTAneous, TID WC  insulin lispro (HUMALOG) injection vial 0-4 Units, 0-4 Units, SubCUTAneous, Nightly  aspirin EC tablet 162 mg, 162 mg, Oral, Daily  busPIRone (BUSPAR) tablet 15 mg, 15 mg, Oral, BID  empagliflozin (JARDIANCE) tablet 25 mg, 25 mg, Oral, Daily  [Held by provider] furosemide (LASIX) tablet 20 mg, 20 mg, Oral, Daily  gabapentin (NEURONTIN) capsule 300 mg, 300 mg, Oral, TID  [Held by provider] lisinopril (PRINIVIL;ZESTRIL) tablet 10 mg, 10 mg, Oral, Daily  insulin glargine-yfgn (SEMGLEE-YFGN) injection vial 60 Units, 60 Units, SubCUTAneous, QAM AC  metoprolol succinate (TOPROL XL) extended release tablet 25 mg, 25 mg, Oral, Daily  rosuvastatin (CRESTOR) tablet 40 mg, 40 mg, Oral, Nightly  sertraline (ZOLOFT) tablet 25 mg, 25 mg, Oral, Daily  vitamin D3 (CHOLECALCIFEROL) tablet 5,000 Units, 5,000 Units, Oral, Daily  sodium chloride flush 0.9 % injection 10 mL, 10 mL, IntraVENous, 2 times per day  sodium chloride flush 0.9 % injection 10 mL, 10 mL, IntraVENous, PRN  0.9 % sodium chloride infusion, , IntraVENous, PRN  enoxaparin Sodium (LOVENOX) injection 30 mg, 30 mg, SubCUTAneous, BID  promethazine (PHENERGAN) tablet 12.5 mg, 12.5 mg, Oral, Q6H PRN **OR** ondansetron (ZOFRAN) injection 4 mg, 4 mg, IntraVENous, Q6H PRN  polyethylene glycol (GLYCOLAX) packet 17 g, 17 g, Oral, Daily PRN  acetaminophen (TYLENOL) tablet 650 mg, 650 mg, Oral, Q6H PRN **OR** acetaminophen (TYLENOL) suppository 650 mg, 650 mg, Rectal, Q6H PRN    ASSESSMENT:   Cervical stenosis   C5 fracture    PLAN:  -MRI reviewed.  Cervical stenosis noted at C4-C7 with myelomalacia.   -Cervical collar ordered  -Okay for PT/OT in cervical collar   -Follow up with Dr. Tristin Torres in 2 weeks to discuss surgical intervention       Electronically signed by Apple Cardenas PA-C on 8/5/2022 at 2:31 PM    Nsx Attending:    Patient was seen and examined by me with the team.  I personally reviewed all pertinent radiological images. I concur with Miss Perera's clinical assessment and plan. Left upper extremity has improved significantly. Patient still experiences 4 out of 5 triceps wrist extension handgrip and dorsal interossei function on the right. Some dysesthetic pain. Given his improvement recommend collar and surgical decompression in delayed fashion as the patient is eager to return home. He was extensively counseled at bedside regarding the need for bracing and that he is at risk for further damage should he fall again without surgical decompression. We also asked him to abstain from his tobacco use. All questions were answered. Discussed with internal medicine physician at bedside. Thank you so much for allowing us to participate in the care of this patient. I certify that I spent more than half of the total time on this encounter. NOTE: This report was transcribed using voice recognition software.  Every effort was made to ensure accuracy; however, inadvertent computerized transcription errors may be present

## 2022-08-05 NOTE — FLOWSHEET NOTE
08/05/22 0906   Vital Signs   Orthostatic B/P and Pulse?  Yes   Blood Pressure Lying 140/80   Pulse Lying 74 PER MINUTE   Blood Pressure Sitting 120/80   Pulse Sitting 79 PER MINUTE   Blood Pressure Standing 81/70   Pulse Standing 91 PER MINUTE

## 2022-08-05 NOTE — CARE COORDINATION
Discharge order noted. Pt eval 18/24. Per neurosurgery-Okay for PT/OT in cervical collar-Follow up with Dr. Jo Matute in 2 weeks to discuss surgical intervention. anticipates Discharge plan is home. His son will transport home when medically cleared. CM/SW will continue to follow. Electronically signed by Samanta Brown RN on 8/5/2022 at 2:49 PM

## 2022-08-05 NOTE — PROGRESS NOTES
Physical Therapy  Physical Therapy Initial Assessment       Name: Isai Perez  : 1970  MRN: [id number]      Date of Service: 2022    Evaluating PT:  Ko Pitts PT, DPT  [id number]    Room #:  7467/2356-V  Diagnosis:  Syncope and collapse [R55]  Closed nondisplaced fracture of fifth cervical vertebra, unspecified fracture morphology, initial encounter ([place] [address].) [S12.401A]  PMHx/PSHx:   has a past medical history of Anxiety, Atrial fibrillation, unspecified type ([facility] CAD (coronary artery disease), Cardiomyopathy ([facility] CKD (chronic kidney disease), Depression, HTN (hypertension), Hyperlipidemia, LBBB (left bundle branch block), and Type 2 diabetes mellitus without complication ([place] [address].). Procedure/Surgery:    Precautions:  Falls  Equipment Needs:      SUBJECTIVE:    Pt lives alone in a 1 story home with 2 stairs to enter and single rail. Bed is on first floor and bath is on first floor. Pt ambulated with no device independently PTA. Equipment Owned:     OBJECTIVE:   Initial Evaluation  Date: 22 Treatment Short Term/ Long Term   Goals   AM-PAC 6 Clicks 45/81     Was pt agreeable to Eval/treatment? Yes     Does pt have pain? Neuropathic pain radiating through BUEs R>L     Bed Mobility  Rolling: Independent  Supine to sit: Independent  Sit to supine: NT  Scooting: Independent    Independent     Transfers Sit to stand: SBA  Stand to sit: SBA  Stand pivot: SBA  Sit to stand: Independent    Stand to sit:  Independent    Stand pivot: Independent     Ambulation    300 feet with SBA no AD  >300 feet with Independent      Stair negotiation: ascended and descended  NT  >4 steps with single rail Modified Independent     ROM BUE:  Defer to OT  BLE:  WFL     Strength BUE:  Defer to OT  BLE:    Improve 1 MMT   Balance Sitting EOB:  Independent    Dynamic Standing:  SBA no AD  Sitting EOB:  Independent    Dynamic Standing:  Independent       Pt is A & O x 4  Sensation:  Decreased B hands  Edema:  mild facial edema periorbital          Therapeutic Exercises:  functional mobility    Patient education  Pt educated on role of PT    Patient response to education:   Pt verbalized understanding Pt demonstrated skill Pt requires further education in this area   x x x     ASSESSMENT:    Conditions Requiring Skilled Therapeutic Intervention:    [x]Decreased strength     [x]Decreased ROM  [x]Decreased functional mobility  [x]Decreased balance   [x]Decreased endurance   []Decreased posture  []Decreased sensation  []Decreased coordination   []Decreased vision  [x]Decreased safety awareness   [x]Increased pain       Comments:  Pt agreeable to PT evaluation. Pt performing all mobility without assist. Pt denied any dizziness with  mobility this date. Pt ambulation pattern steady. Patient would benefit from continued skilled PT to maximize functional mobility independence. Treatment:  Patient practiced and was instructed in the following treatment:      Functional transfers-Verbal cues for proper positioning and sequencing to perform transfers safely with maximum independence. Gait training-Verbal cues for proper positioning and sequencing to maximize functional mobility independence. Pt's/ family goals   1. Get better    Prognosis is good for reaching above PT goals. Patient and or family understand(s) diagnosis, prognosis, and plan of care.   yes    PHYSICAL THERAPY PLAN OF CARE:    PT POC is established based on physician order and patient diagnosis     Referring provider/PT Order:    08/03/22 1015  PT eval and treat  Start:  08/03/22 1015,   End:  08/03/22 1015,   ONE TIME,   Standing Count:  1 Occurrences,   R         Nicky Bumpers, MD     Diagnosis:  Syncope and collapse [R55]  Closed nondisplaced fracture of fifth cervical vertebra, unspecified fracture morphology, initial encounter [facility]) [S12.401A]  Specific instructions for next treatment:  Gait training    Current Treatment Recommendations:     [x] Strengthening to improve independence with functional mobility   [x] ROM to improve independence with functional mobility   [x] Balance Training to improve static/dynamic balance and to reduce fall risk   Endurance Training to improve activity tolerance during functional mobility   [x][x] Transfer Training to improve safety and independence with all functional transfers   [x] Gait Training to improve gait mechanics, endurance and assess need for appropriate assistive device   Stair Training in preparation for safe discharge home and/or into the community   [x][x] Positioning to prevent skin breakdown and contractures  [x] Safety and Education Training   [x] Patient/Caregiver Education   [x] HEP  [] Other     PT long term treatment goals are located in above grid    Frequency of treatments: 2-5x/week x 1-2 weeks. Time in  1026  Time out  1046    Total Treatment Time  8 minutes     Evaluation Time includes thorough review of current medical information, gathering information on past medical history/social history and prior level of function, completion of standardized testing/informal observation of tasks, assessment of data and education on plan of care and goals.     CPT codes:  [x] Low Complexity PT evaluation 76489  [] Moderate Complexity PT evaluation 95644  [] High Complexity PT evaluation 35261  [] PT Re-evaluation 77110  [] Gait training 74459 0 minutes  [] Manual therapy 80983 0 minutes  [x] Therapeutic activities 29243 8 minutes  [] Therapeutic exercises 19442 0 minutes  [] Neuromuscular reeducation 67064 0 minutes       Kirby Aparicio PT, DPT   [id number]

## 2022-08-05 NOTE — PLAN OF CARE
Problem: Chronic Conditions and Co-morbidities  Goal: Patient's chronic conditions and co-morbidity symptoms are monitored and maintained or improved  Outcome: Completed     Problem: Discharge Planning  Goal: Discharge to home or other facility with appropriate resources  Outcome: Completed     Problem: Pain  Goal: Verbalizes/displays adequate comfort level or baseline comfort level  Outcome: Completed     Problem: Skin/Tissue Integrity  Goal: Absence of new skin breakdown  Description: 1. Monitor for areas of redness and/or skin breakdown  2. Assess vascular access sites hourly  3. Every 4-6 hours minimum:  Change oxygen saturation probe site  4. Every 4-6 hours:  If on nasal continuous positive airway pressure, respiratory therapy assess nares and determine need for appliance change or resting period.   Outcome: Completed     Problem: Safety - Adult  Goal: Free from fall injury  Outcome: Completed     Problem: ABCDS Injury Assessment  Goal: Absence of physical injury  Outcome: Completed     Problem: Cardiovascular - Adult  Goal: Maintains optimal cardiac output and hemodynamic stability  Outcome: Completed     Problem: Metabolic/Fluid and Electrolytes - Adult  Goal: Hemodynamic stability and optimal renal function maintained  Outcome: Completed

## 2022-08-08 ENCOUNTER — TELEPHONE (OUTPATIENT)
Dept: PRIMARY CARE CLINIC | Age: 52
End: 2022-08-08

## 2022-08-09 ENCOUNTER — TELEPHONE (OUTPATIENT)
Dept: CARDIAC REHAB | Age: 52
End: 2022-08-09

## 2022-08-09 NOTE — PROGRESS NOTES
Yelena Quiñones has completed 8/36 telemetry monitored exercise sessions. He has been a no call, no show since 6/13/2022 and has not returned our calls. We are discharging him at this time. We would be happy to accommodate him if he would like to return to Cardiac Rehab. Upon request, adetailed summary of his sessions can be sent for your review. Please call [place] at [phone] with any questions or concerns. Thank you for allowing us to care for your patient.

## 2022-08-09 NOTE — TELEPHONE ENCOUNTER
CARLOS AM to check about absence since 6/13/22. Asked to call us if he ever wants to return. He is discharged from the program as of this time.

## 2022-08-17 ENCOUNTER — OFFICE VISIT (OUTPATIENT)
Dept: NEUROSURGERY | Age: 52
End: 2022-08-17
Payer: COMMERCIAL

## 2022-08-17 VITALS
WEIGHT: 269 LBS | OXYGEN SATURATION: 95 % | DIASTOLIC BLOOD PRESSURE: 69 MMHG | SYSTOLIC BLOOD PRESSURE: 101 MMHG | HEIGHT: 78 IN | BODY MASS INDEX: 31.12 KG/M2 | TEMPERATURE: 98.1 F | HEART RATE: 88 BPM | RESPIRATION RATE: 20 BRPM

## 2022-08-17 DIAGNOSIS — M48.02 CERVICAL STENOSIS OF SPINAL CANAL: Primary | ICD-10-CM

## 2022-08-17 PROCEDURE — 3017F COLORECTAL CA SCREEN DOC REV: CPT | Performed by: NEUROLOGICAL SURGERY

## 2022-08-17 PROCEDURE — 4004F PT TOBACCO SCREEN RCVD TLK: CPT | Performed by: NEUROLOGICAL SURGERY

## 2022-08-17 PROCEDURE — 99213 OFFICE O/P EST LOW 20 MIN: CPT | Performed by: NEUROLOGICAL SURGERY

## 2022-08-17 PROCEDURE — 99212 OFFICE O/P EST SF 10 MIN: CPT

## 2022-08-17 PROCEDURE — G8417 CALC BMI ABV UP PARAM F/U: HCPCS | Performed by: NEUROLOGICAL SURGERY

## 2022-08-17 PROCEDURE — G8427 DOCREV CUR MEDS BY ELIG CLIN: HCPCS | Performed by: NEUROLOGICAL SURGERY

## 2022-08-17 PROCEDURE — 1111F DSCHRG MED/CURRENT MED MERGE: CPT | Performed by: NEUROLOGICAL SURGERY

## 2022-08-17 NOTE — PROGRESS NOTES
Patient is here for follow up from a hospital consult for: cervical stenosis from C3-C7    Physical exam  Alert and Oriented X3  PERRLA, EOMI  VILLAREAL 5/5  Sensation intact to LT and PP  Reflexes are 3+ and symmetric  Positive Edmar's     A/P: patient is here for follow up for: cervical stenosis from C3-C7. I have advised him that I am recommending surgery as he has signs of myelopathy. He will, think about it and will call when ready.   He states that the has orthostasis and wants this to be taken care of first.     Carlyle Albrecht MD

## 2022-08-18 NOTE — DISCHARGE SUMMARY
Physician Discharge Summary     Patient ID:  Efraín Billy  [id number]  46 y.o.  1970    Admit date: 8/2/2022    Discharge date and time:  8/5/2022    Discharge Diagnoses: Principal Problem:    Syncope and collapse  Active Problems:    Type 2 diabetes mellitus with diabetic polyneuropathy, with long-term current use of insulin (HCC)    CAD, multiple vessel    Essential hypertension    Ischemic cardiomyopathy    Closed nondisplaced fracture of fifth cervical vertebra (HCC)    Frequent falls    Chronic systolic CHF (congestive heart failure) ([facility])    Orthostatic hypotension  Resolved Problems:    * No resolved hospital problems. *      Consults: IP CONSULT TO NEUROSURGERY  IP CONSULT TO INTERNAL MEDICINE  INPATIENT CONSULT TO ORTHOTIST/PROSTHETIST    Procedures: See below    Hospital Course: Patient admitted on 8/2/2022  46y.o. year old male  who presents to the emergency department with complaints of syncope and multiple falls. Last fall was yesterday. He states he hit his head against the bathtub. Currently complaining of chest and neck pain on the areas that he fell on. Vital signs within normal limits and stable. laboratory studies notable for glucose 159, proBNP 221, troponin 14. CT the head unremarkable. CT cervical spine shows small oblique nondisplaced anterior inferior endplate C5 fracture. CT chest was unremarkable. Neurosurgery was consulted. Cervical collar was placed in the emergency department. Medicine consulted for admission    Syncope-suspected secondary to orthostasis-suspect component of autonomic insufficiency  Repeat orthostatic vital signs after placement of SRAVANTHI hose. Discussed techniques to minimize fall risk. Rising slowly. Waiting before moving. Using assist device. Wearing SRAVANTHI hose. Medications adjusted. Advised increased water intake, less soda.   Pain control  Bowel regimen  Orthostatics positive, repeat  IV fluid resuscitation  Hold antihypertensives  SRAVANTHI hose  -Telemetry  EKG reviewed-normal sinus rhythm, PVCs, nonischemic ST-T wave abnormalities-not significantly changed from recent reviewed EKG  Pt with significant orthostasis as likely etiology of syncope. History not consistent with seizure       C5 endplate fracture  -Custom cervical collar  -Echocardiogram  -Consult neurosurgery-- Consult appreciated-plan for MRI cspine  C-spine demonstrating myelomalacia with high-grade canal stenosis at multiple levels     Frontal scalp contusion with periorbital ecchymosis  Developing periorbital ecchymosis  Conservative management     Dm2 unc  Add MBS, SSI, A1c   -Continue home medications     HTN hold medications Monitor BP and labs. -Held     Chronic systolic CHF/ischemic cardiomyopathy/history CAD-EF is recovered to 50 to 55%  P.o. Lasix-held  ASA  Monitor I's and O's  Daily weights  Monitor labs closely  Echo:Ejection fraction is visually estimated at 50-55%. No regional wall motion abnormalities seen. Normal right ventricle structure and function. Physiologic and/or trace mitral regurgitation is present. No evidence for hemodynamically significant pericardial effusion       Discharge Exam:  See progress note from today    Condition:  Stable    Disposition: home    Patient Instructions:   Discharge Medication List as of 8/5/2022  7:12 PM        START taking these medications    Details   oxyCODONE-acetaminophen (PERCOCET) 5-325 MG per tablet Take 1 tablet by mouth every 6 hours as needed for Pain for up to 5 days. Intended supply: 5 days. Take lowest dose possible to manage pain, Disp-20 tablet, R-0Print      sennosides-docusate sodium (SENOKOT-S) 8.6-50 MG tablet Take 2 tablets by mouth 2 times daily as needed for Constipation, Disp-50 tablet, R-0Normal           CONTINUE these medications which have NOT CHANGED    Details   insulin lispro, 1 Unit Dial, (HUMALOG KWIKPEN) 100 UNIT/ML SOPN Inject 10 units with meals + following sliding scale.  -200 add 3U, -250 add 6U, -300 add 9U, -350 add 12U, -400 add 15U, BS over 400 add 18U, Disp-20 pen, R-3Normal      insulin glargine (LANTUS SOLOSTAR) 100 UNIT/ML injection pen Inject 60 Units into the skin every morning, Disp-25 pen, R-3Normal      Insulin Pen Needle (EASY TOUCH PEN NEEDLES) 32G X 5 MM MISC Disp-350 each, R-3, NormalUSE FOUR TIMES DAILY WITH INSULIN PENS      empagliflozin (JARDIANCE) 25 MG tablet TAKE 1 TABLET BY MOUTH ONCE DAILY FOR 90 DAYS, Disp-90 tablet, R-3Normal      OZEMPIC, 0.25 OR 0.5 MG/DOSE, 2 MG/1.5ML SOPN INJECT 0.5MG SUBCUTANEOUSLY ONCE A WEEK, Disp-3 pen, R-3, DAWNormal      metoprolol succinate (TOPROL XL) 25 MG extended release tablet TAKE 1 TABLET BY MOUTH ONCE DAILY FOR 90 DAYS, Disp-90 tablet, R-1Normal      sertraline (ZOLOFT) 25 MG tablet Take 1 tablet by mouth daily, Disp-30 tablet, R-1Normal      rosuvastatin (CRESTOR) 40 MG tablet Take 1 tablet by mouth nightly, Disp-30 tablet, R-5Normal      ONETOUCH VERIO strip USE 1 STRIP TO CHECK GLUCOSE TWICE DAILY, DAWHistorical Med      vitamin D3 (CHOLECALCIFEROL) 125 MCG (5000 UT) TABS tablet Take 5,000 Units by mouth dailyHistorical Med      aspirin 81 MG EC tablet Take 162 mg by mouth dailyHistorical Med      Ascorbic Acid (VITAMIN C) 1000 MG tablet Take 1,000 mg by mouth dailyHistorical Med      tadalafil (CIALIS) 20 MG tablet Take 1 tablet by mouth daily as needed for Erectile Dysfunction, Disp-30 tablet, R-1Normal      gabapentin (NEURONTIN) 300 MG capsule Take 1 capsule by mouth 3 times daily for 90 days. , Disp-270 capsule, R-0Normal      busPIRone (BUSPAR) 15 MG tablet TAKE 1 TABLET BY MOUTH TWICE DAILY, Disp-180 tablet, R-1Normal           STOP taking these medications       lisinopril (PRINIVIL;ZESTRIL) 10 MG tablet Comments:   Reason for Stopping:         citalopram (CELEXA) 10 MG tablet Comments:   Reason for Stopping:         furosemide (LASIX) 20 MG tablet Comments:   Reason for Stopping: Activity: activity as tolerated  Diet: regular diet carb control    Follow-up with 1 week PCP 2 weeks neurosurgery    Note that over 30 minutes was spent in preparing discharge papers, discussing discharge with patient, staff, consultants, medication review, arranging follow up, etc.    Signed:  Sara Larson MD  8/18/2022  12:53 PM

## 2022-08-29 DIAGNOSIS — E11.42 TYPE 2 DIABETES MELLITUS WITH DIABETIC POLYNEUROPATHY, WITH LONG-TERM CURRENT USE OF INSULIN (HCC): ICD-10-CM

## 2022-08-29 DIAGNOSIS — Z79.4 TYPE 2 DIABETES MELLITUS WITH DIABETIC POLYNEUROPATHY, WITH LONG-TERM CURRENT USE OF INSULIN (HCC): ICD-10-CM

## 2022-08-31 ENCOUNTER — TELEPHONE (OUTPATIENT)
Dept: ENDOCRINOLOGY | Age: 52
End: 2022-08-31

## 2022-08-31 RX ORDER — PEN NEEDLE, DIABETIC 32 GX3/16"
NEEDLE, DISPOSABLE MISCELLANEOUS
Qty: 400 EACH | Refills: 1 | Status: SHIPPED | OUTPATIENT
Start: 2022-08-31

## 2022-09-20 RX ORDER — SERTRALINE HYDROCHLORIDE 25 MG/1
TABLET, FILM COATED ORAL
Qty: 30 TABLET | Refills: 2 | Status: SHIPPED
Start: 2022-09-20 | End: 2022-10-13 | Stop reason: SDUPTHER

## 2022-09-29 DIAGNOSIS — Z79.4 TYPE 2 DIABETES MELLITUS WITH DIABETIC POLYNEUROPATHY, WITH LONG-TERM CURRENT USE OF INSULIN (HCC): ICD-10-CM

## 2022-09-29 DIAGNOSIS — E11.42 TYPE 2 DIABETES MELLITUS WITH DIABETIC POLYNEUROPATHY, WITH LONG-TERM CURRENT USE OF INSULIN (HCC): ICD-10-CM

## 2022-09-29 RX ORDER — INSULIN GLARGINE 100 [IU]/ML
60 INJECTION, SOLUTION SUBCUTANEOUS EVERY MORNING
Qty: 6 ADJUSTABLE DOSE PRE-FILLED PEN SYRINGE | Refills: 2 | Status: SHIPPED
Start: 2022-09-29 | End: 2022-10-13 | Stop reason: SDUPTHER

## 2022-09-29 NOTE — TELEPHONE ENCOUNTER
----- Message from Lanie Janet sent at 9/28/2022  2:09 PM EDT -----  Subject: Refill Request    QUESTIONS  Name of Medication? insulin glargine (LANTUS SOLOSTAR) 100 UNIT/ML   injection pen  Patient-reported dosage and instructions? 60 units/day  How many days do you have left? 4  Preferred Pharmacy? [facility] phone number (if available)? [phone]  Additional Information for Provider? Patient will not have enough to make   it till his appt, can he get a refill to last him?  ---------------------------------------------------------------------------  --------------  CALL BACK INFO  What is the best way for the office to contact you? OK to leave message on   voicemail  Preferred Call Back Phone Number? [phone]  ---------------------------------------------------------------------------  --------------  SCRIPT ANSWERS  Relationship to Patient?  Self

## 2022-10-12 ENCOUNTER — OFFICE VISIT (OUTPATIENT)
Dept: PRIMARY CARE CLINIC | Age: 52
End: 2022-10-12
Payer: COMMERCIAL

## 2022-10-12 VITALS
BODY MASS INDEX: 32.12 KG/M2 | SYSTOLIC BLOOD PRESSURE: 136 MMHG | HEART RATE: 87 BPM | WEIGHT: 277.6 LBS | TEMPERATURE: 98.6 F | OXYGEN SATURATION: 98 % | HEIGHT: 78 IN | RESPIRATION RATE: 20 BRPM | DIASTOLIC BLOOD PRESSURE: 82 MMHG

## 2022-10-12 DIAGNOSIS — M48.02 CERVICAL STENOSIS OF SPINE: ICD-10-CM

## 2022-10-12 DIAGNOSIS — E11.51 TYPE 2 DIABETES MELLITUS WITH DIABETIC PERIPHERAL ANGIOPATHY WITHOUT GANGRENE, WITH LONG-TERM CURRENT USE OF INSULIN (HCC): ICD-10-CM

## 2022-10-12 DIAGNOSIS — I50.22 CHRONIC SYSTOLIC CHF (CONGESTIVE HEART FAILURE) (HCC): ICD-10-CM

## 2022-10-12 DIAGNOSIS — Z79.4 TYPE 2 DIABETES MELLITUS WITH DIABETIC POLYNEUROPATHY, WITH LONG-TERM CURRENT USE OF INSULIN (HCC): ICD-10-CM

## 2022-10-12 DIAGNOSIS — S12.401D CLOSED NONDISPLACED FRACTURE OF FIFTH CERVICAL VERTEBRA WITH ROUTINE HEALING, UNSPECIFIED FRACTURE MORPHOLOGY, SUBSEQUENT ENCOUNTER: Primary | ICD-10-CM

## 2022-10-12 DIAGNOSIS — Z95.1 HISTORY OF FOUR VESSEL CORONARY ARTERY BYPASS GRAFT: ICD-10-CM

## 2022-10-12 DIAGNOSIS — E11.42 TYPE 2 DIABETES MELLITUS WITH DIABETIC POLYNEUROPATHY, WITH LONG-TERM CURRENT USE OF INSULIN (HCC): ICD-10-CM

## 2022-10-12 DIAGNOSIS — Z79.4 TYPE 2 DIABETES MELLITUS WITH DIABETIC PERIPHERAL ANGIOPATHY WITHOUT GANGRENE, WITH LONG-TERM CURRENT USE OF INSULIN (HCC): ICD-10-CM

## 2022-10-12 DIAGNOSIS — F41.1 GAD (GENERALIZED ANXIETY DISORDER): ICD-10-CM

## 2022-10-12 PROCEDURE — 3017F COLORECTAL CA SCREEN DOC REV: CPT | Performed by: FAMILY MEDICINE

## 2022-10-12 PROCEDURE — G8484 FLU IMMUNIZE NO ADMIN: HCPCS | Performed by: FAMILY MEDICINE

## 2022-10-12 PROCEDURE — 3046F HEMOGLOBIN A1C LEVEL >9.0%: CPT | Performed by: FAMILY MEDICINE

## 2022-10-12 PROCEDURE — 2022F DILAT RTA XM EVC RTNOPTHY: CPT | Performed by: FAMILY MEDICINE

## 2022-10-12 PROCEDURE — 3078F DIAST BP <80 MM HG: CPT | Performed by: FAMILY MEDICINE

## 2022-10-12 PROCEDURE — 4004F PT TOBACCO SCREEN RCVD TLK: CPT | Performed by: FAMILY MEDICINE

## 2022-10-12 PROCEDURE — G8417 CALC BMI ABV UP PARAM F/U: HCPCS | Performed by: FAMILY MEDICINE

## 2022-10-12 PROCEDURE — G8427 DOCREV CUR MEDS BY ELIG CLIN: HCPCS | Performed by: FAMILY MEDICINE

## 2022-10-12 PROCEDURE — 3074F SYST BP LT 130 MM HG: CPT | Performed by: FAMILY MEDICINE

## 2022-10-12 PROCEDURE — 99214 OFFICE O/P EST MOD 30 MIN: CPT | Performed by: FAMILY MEDICINE

## 2022-10-12 SDOH — ECONOMIC STABILITY: FOOD INSECURITY: WITHIN THE PAST 12 MONTHS, THE FOOD YOU BOUGHT JUST DIDN'T LAST AND YOU DIDN'T HAVE MONEY TO GET MORE.: NEVER TRUE

## 2022-10-12 SDOH — ECONOMIC STABILITY: FOOD INSECURITY: WITHIN THE PAST 12 MONTHS, YOU WORRIED THAT YOUR FOOD WOULD RUN OUT BEFORE YOU GOT MONEY TO BUY MORE.: NEVER TRUE

## 2022-10-12 ASSESSMENT — SOCIAL DETERMINANTS OF HEALTH (SDOH): HOW HARD IS IT FOR YOU TO PAY FOR THE VERY BASICS LIKE FOOD, HOUSING, MEDICAL CARE, AND HEATING?: NOT HARD AT ALL

## 2022-10-12 NOTE — PROGRESS NOTES
Tone Patino  : 1970    Chief Complaint:     Chief Complaint   Patient presents with    Diabetes       HPI  Cervical stenosis following with Dr Ronni Rodriguez, planned for surgery soon once elects. Myelopathy continues but stable. To review DM. Down slightly from 10.2. sugars still running high, but no hypoglycemic episodes. Hemoglobin A1C   Date Value Ref Range Status   2022 9.4 (H) 4.0 - 5.6 % Final   RAQUEL was previously on buspar with his zoloft which helped his anxiety, would like refill of this. CHF euvolemic. Lasix working well for volume control. Continues on Jardiance for both DM and CHF. Past medical, surgical, family and social histories reviewed and updated today as appropriate    Health Maintenance Due   Topic Date Due    Pneumococcal 0-64 years Vaccine (1 - PCV) Never done    Diabetic foot exam  Never done    HIV screen  Never done    Diabetic retinal exam  Never done    Hepatitis C screen  Never done    Hepatitis B vaccine (1 of 3 - Risk 3-dose series) 1989    DTaP/Tdap/Td vaccine (1 - Tdap) Never done    Colorectal Cancer Screen  Never done    Shingles vaccine (1 of 2) Never done    COVID-19 Vaccine (4 - Booster for Moderna series) 2022    Flu vaccine (1) 2022    A1C test (Diabetic or Prediabetic)  2022       Current Outpatient Medications   Medication Sig Dispense Refill    sertraline (ZOLOFT) 25 MG tablet TAKE ONE TABLET BY MOUTH DAILY 90 tablet 1    metoprolol succinate (TOPROL XL) 25 MG extended release tablet TAKE 1 TABLET BY MOUTH ONCE DAILY FOR 90 DAYS 90 tablet 1    insulin glargine (LANTUS SOLOSTAR) 100 UNIT/ML injection pen Inject 60 Units into the skin every morning 6 Adjustable Dose Pre-filled Pen Syringe 2    busPIRone (BUSPAR) 15 MG tablet TAKE 1 TABLET BY MOUTH TWICE DAILY 180 tablet 1    empagliflozin (JARDIANCE) 25 MG tablet Indications: This medication can cause dehydration if blood sugars are not controlled.  TAKE 1 TABLET BY MOUTH ONCE DAILY FOR 90 DAYS 90 tablet 1    gabapentin (NEURONTIN) 300 MG capsule Take 1 capsule by mouth 3 times daily for 90 days. 270 capsule 1    insulin lispro, 1 Unit Dial, (HUMALOG KWIKPEN) 100 UNIT/ML SOPN Inject 10 units with meals + following sliding scale. -200 add 3U, -250 add 6U, -300 add 9U, -350 add 12U, -400 add 15U, BS over 400 add 18-20U. Max 90 units per day 27 Adjustable Dose Pre-filled Pen Syringe 0    OZEMPIC, 0.25 OR 0.5 MG/DOSE, 2 MG/1.5ML SOPN INJECT 0.5MG SUBCUTANEOUSLY ONCE A WEEK 2 mL 3    rosuvastatin (CRESTOR) 40 MG tablet Take 1 tablet by mouth nightly 90 tablet 1    oxyCODONE-acetaminophen (PERCOCET) 7.5-325 MG per tablet Take 1 tablet by mouth 2 times daily as needed for Pain for up to 30 days. 60 tablet 0    Insulin Pen Needle (EASY TOUCH PEN NEEDLES) 32G X 5 MM MISC USE DAILY WITH INSULIN PENS 400 each 1    ONETOUCH VERIO strip USE 1 STRIP TO CHECK GLUCOSE TWICE DAILY      vitamin D3 (CHOLECALCIFEROL) 125 MCG (5000 UT) TABS tablet Take 5,000 Units by mouth daily      aspirin 81 MG EC tablet Take 162 mg by mouth daily      Ascorbic Acid (VITAMIN C) 1000 MG tablet Take 1,000 mg by mouth daily      furosemide (LASIX) 20 MG tablet Take 1 tablet by mouth daily as needed (LE edema or weight gain as reviewed) 30 tablet 2    sennosides-docusate sodium (SENOKOT-S) 8.6-50 MG tablet Take 2 tablets by mouth 2 times daily as needed for Constipation (Patient not taking: Reported on 10/12/2022) 50 tablet 0    tadalafil (CIALIS) 20 MG tablet Take 1 tablet by mouth daily as needed for Erectile Dysfunction (Patient not taking: Reported on 10/12/2022) 30 tablet 1     No current facility-administered medications for this visit. No Known Allergies      REVIEW OF SYSTEMS  Review of Systems   Constitutional:  Negative for chills, diaphoresis, fatigue and fever. Respiratory:  Negative for cough, chest tightness, shortness of breath and wheezing.     Cardiovascular:  Negative for chest pain, palpitations and leg swelling. Endocrine: Negative for cold intolerance, heat intolerance, polydipsia, polyphagia and polyuria. Neurological:  Negative for dizziness, syncope, weakness, numbness and headaches. Psychiatric/Behavioral:  Negative for confusion and dysphoric mood (symptoms controlled). The patient is nervous/anxious. All other systems reviewed and are negative. PHYSICAL EXAM  /82   Pulse 87   Temp 98.6 °F (37 °C) (Temporal)   Resp 20   Ht 6' 7\" (2.007 m)   Wt 277 lb 9.6 oz (125.9 kg)   SpO2 98%   BMI 31.27 kg/m²   Physical Exam      The 10-year ASCVD risk score (Kika CORREA, et al., 2019) is: 9.9%    Values used to calculate the score:      Age: 46 years      Sex: Male      Is Non- : No      Diabetic: Yes      Tobacco smoker: No      Systolic Blood Pressure: 436 mmHg      Is BP treated: Yes      HDL Cholesterol: 49 mg/dL      Total Cholesterol: 190 mg/dL    ASSESSMENT/PLAN:    1. Closed nondisplaced fracture of fifth cervical vertebra with routine healing, unspecified fracture morphology, subsequent encounter  -     oxyCODONE-acetaminophen (PERCOCET) 7.5-325 MG per tablet; Take 1 tablet by mouth 2 times daily as needed for Pain for up to 30 days. , Disp-60 tablet, R-0Normal  2. History of four vessel coronary artery bypass graft  -     metoprolol succinate (TOPROL XL) 25 MG extended release tablet; TAKE 1 TABLET BY MOUTH ONCE DAILY FOR 90 DAYS, Disp-90 tablet, R-1Normal  -     rosuvastatin (CRESTOR) 40 MG tablet; Take 1 tablet by mouth nightly, Disp-90 tablet, R-1Normal  3. Type 2 diabetes mellitus with diabetic polyneuropathy, with long-term current use of insulin (HCC)  -     insulin glargine (LANTUS SOLOSTAR) 100 UNIT/ML injection pen; Inject 60 Units into the skin every morning, Disp-6 Adjustable Dose Pre-filled Pen Syringe, R-2Normal  -     empagliflozin (JARDIANCE) 25 MG tablet; Indications:  This medication can cause dehydration if blood sugars are not controlled. TAKE 1 TABLET BY MOUTH ONCE DAILY FOR 90 DAYS, Disp-90 tablet, R-1Normal  -     insulin lispro, 1 Unit Dial, (HUMALOG KWIKPEN) 100 UNIT/ML SOPN; Inject 10 units with meals + following sliding scale. -200 add 3U, -250 add 6U, -300 add 9U, -350 add 12U, -400 add 15U, BS over 400 add 18-20U. Max 90 units per day, Disp-27 Adjustable Dose Pre-filled Pen Syringe, R-0Normal  -     OZEMPIC, 0.25 OR 0.5 MG/DOSE, 2 MG/1.5ML SOPN; INJECT 0.5MG SUBCUTANEOUSLY ONCE A WEEK, Disp-2 mL, R-3, DAWNormal  4. RAQUEL (generalized anxiety disorder)  -     busPIRone (BUSPAR) 15 MG tablet; TAKE 1 TABLET BY MOUTH TWICE DAILY, Disp-180 tablet, R-1Normal  5. Type 2 diabetes mellitus with diabetic peripheral angiopathy without gangrene, with long-term current use of insulin (HCC)  -     gabapentin (NEURONTIN) 300 MG capsule; Take 1 capsule by mouth 3 times daily for 90 days. , Disp-270 capsule, R-1Normal  6. Cervical stenosis of spine  -     oxyCODONE-acetaminophen (PERCOCET) 7.5-325 MG per tablet; Take 1 tablet by mouth 2 times daily as needed for Pain for up to 30 days. , Disp-60 tablet, R-0Normal        Reviewed age and gender appropriate health screening exams and vaccinations. Advised patient regarding importance of keeping up with recommended health maintenance and to schedule as soon as possible if overdue, as this is important in assessing for undiagnosed pathology, especially cancer. Patient verbalizes understanding and agrees. Call or go to ED immediately if symptoms worsen or persist.  No follow-ups on file. Sooner if necessary. Counseled regarding above diagnosis(es), including possible risks and complications, especially if left uncontrolled. Counseled regarding the possible side effects, risks, benefits and alternatives to treatment; patient and/or guardian verbalizes understanding. Advised patient to call with any new medication issues.  All questions answered.     Juliann Cat MD  10/12/22

## 2022-10-13 RX ORDER — GABAPENTIN 300 MG/1
300 CAPSULE ORAL 3 TIMES DAILY
Qty: 270 CAPSULE | Refills: 1 | Status: SHIPPED
Start: 2022-10-13 | End: 2022-11-15 | Stop reason: SDUPTHER

## 2022-10-13 RX ORDER — BUSPIRONE HYDROCHLORIDE 15 MG/1
TABLET ORAL
Qty: 180 TABLET | Refills: 1 | Status: SHIPPED | OUTPATIENT
Start: 2022-10-13

## 2022-10-13 RX ORDER — INSULIN LISPRO 100 [IU]/ML
INJECTION, SOLUTION INTRAVENOUS; SUBCUTANEOUS
Qty: 27 ADJUSTABLE DOSE PRE-FILLED PEN SYRINGE | Refills: 0 | Status: SHIPPED
Start: 2022-10-13 | End: 2022-11-15 | Stop reason: SDUPTHER

## 2022-10-13 RX ORDER — OXYCODONE AND ACETAMINOPHEN 7.5; 325 MG/1; MG/1
1 TABLET ORAL 2 TIMES DAILY PRN
Qty: 60 TABLET | Refills: 0 | Status: SHIPPED
Start: 2022-10-13 | End: 2022-11-15 | Stop reason: SDUPTHER

## 2022-10-13 RX ORDER — SEMAGLUTIDE 1.34 MG/ML
INJECTION, SOLUTION SUBCUTANEOUS
Qty: 2 ML | Refills: 3 | Status: SHIPPED
Start: 2022-10-13 | End: 2022-11-15 | Stop reason: SDUPTHER

## 2022-10-13 RX ORDER — INSULIN GLARGINE 100 [IU]/ML
60 INJECTION, SOLUTION SUBCUTANEOUS EVERY MORNING
Qty: 6 ADJUSTABLE DOSE PRE-FILLED PEN SYRINGE | Refills: 2 | Status: SHIPPED
Start: 2022-10-13 | End: 2022-11-15 | Stop reason: SDUPTHER

## 2022-10-13 RX ORDER — METOPROLOL SUCCINATE 25 MG/1
TABLET, EXTENDED RELEASE ORAL
Qty: 90 TABLET | Refills: 1 | Status: SHIPPED | OUTPATIENT
Start: 2022-10-13

## 2022-10-13 RX ORDER — SERTRALINE HYDROCHLORIDE 25 MG/1
TABLET, FILM COATED ORAL
Qty: 90 TABLET | Refills: 1 | Status: SHIPPED | OUTPATIENT
Start: 2022-10-13

## 2022-10-13 RX ORDER — ROSUVASTATIN CALCIUM 40 MG/1
40 TABLET, COATED ORAL NIGHTLY
Qty: 90 TABLET | Refills: 1 | Status: SHIPPED | OUTPATIENT
Start: 2022-10-13

## 2022-10-26 RX ORDER — FUROSEMIDE 20 MG/1
20 TABLET ORAL DAILY PRN
Qty: 30 TABLET | Refills: 2 | Status: SHIPPED | OUTPATIENT
Start: 2022-10-26

## 2022-10-29 ASSESSMENT — ENCOUNTER SYMPTOMS
COUGH: 0
WHEEZING: 0
CHEST TIGHTNESS: 0
SHORTNESS OF BREATH: 0

## 2022-11-15 ENCOUNTER — OFFICE VISIT (OUTPATIENT)
Dept: ENDOCRINOLOGY | Age: 52
End: 2022-11-15
Payer: COMMERCIAL

## 2022-11-15 ENCOUNTER — TELEPHONE (OUTPATIENT)
Dept: PRIMARY CARE CLINIC | Age: 52
End: 2022-11-15

## 2022-11-15 VITALS
SYSTOLIC BLOOD PRESSURE: 138 MMHG | WEIGHT: 274 LBS | BODY MASS INDEX: 31.7 KG/M2 | HEART RATE: 90 BPM | OXYGEN SATURATION: 98 % | RESPIRATION RATE: 16 BRPM | DIASTOLIC BLOOD PRESSURE: 77 MMHG | HEIGHT: 78 IN

## 2022-11-15 DIAGNOSIS — Z79.4 TYPE 2 DIABETES MELLITUS WITH DIABETIC POLYNEUROPATHY, WITH LONG-TERM CURRENT USE OF INSULIN (HCC): Primary | ICD-10-CM

## 2022-11-15 DIAGNOSIS — Z79.4 TYPE 2 DIABETES MELLITUS WITH DIABETIC PERIPHERAL ANGIOPATHY WITHOUT GANGRENE, WITH LONG-TERM CURRENT USE OF INSULIN (HCC): ICD-10-CM

## 2022-11-15 DIAGNOSIS — Z91.119 DIETARY NONCOMPLIANCE: ICD-10-CM

## 2022-11-15 DIAGNOSIS — E55.9 VITAMIN D DEFICIENCY: ICD-10-CM

## 2022-11-15 DIAGNOSIS — E11.51 TYPE 2 DIABETES MELLITUS WITH DIABETIC PERIPHERAL ANGIOPATHY WITHOUT GANGRENE, WITH LONG-TERM CURRENT USE OF INSULIN (HCC): ICD-10-CM

## 2022-11-15 DIAGNOSIS — E78.5 HYPERLIPIDEMIA, UNSPECIFIED HYPERLIPIDEMIA TYPE: ICD-10-CM

## 2022-11-15 DIAGNOSIS — Z79.4 TYPE 2 DIABETES MELLITUS WITH DIABETIC POLYNEUROPATHY, WITH LONG-TERM CURRENT USE OF INSULIN (HCC): ICD-10-CM

## 2022-11-15 DIAGNOSIS — S12.401D CLOSED NONDISPLACED FRACTURE OF FIFTH CERVICAL VERTEBRA WITH ROUTINE HEALING, UNSPECIFIED FRACTURE MORPHOLOGY, SUBSEQUENT ENCOUNTER: ICD-10-CM

## 2022-11-15 DIAGNOSIS — M48.02 CERVICAL STENOSIS OF SPINE: ICD-10-CM

## 2022-11-15 DIAGNOSIS — E11.42 TYPE 2 DIABETES MELLITUS WITH DIABETIC POLYNEUROPATHY, WITH LONG-TERM CURRENT USE OF INSULIN (HCC): Primary | ICD-10-CM

## 2022-11-15 DIAGNOSIS — E11.42 TYPE 2 DIABETES MELLITUS WITH DIABETIC POLYNEUROPATHY, WITH LONG-TERM CURRENT USE OF INSULIN (HCC): ICD-10-CM

## 2022-11-15 LAB — HBA1C MFR BLD: 7.5 %

## 2022-11-15 PROCEDURE — 99214 OFFICE O/P EST MOD 30 MIN: CPT | Performed by: INTERNAL MEDICINE

## 2022-11-15 PROCEDURE — G8427 DOCREV CUR MEDS BY ELIG CLIN: HCPCS | Performed by: INTERNAL MEDICINE

## 2022-11-15 PROCEDURE — 4004F PT TOBACCO SCREEN RCVD TLK: CPT | Performed by: INTERNAL MEDICINE

## 2022-11-15 PROCEDURE — G8484 FLU IMMUNIZE NO ADMIN: HCPCS | Performed by: INTERNAL MEDICINE

## 2022-11-15 PROCEDURE — 2022F DILAT RTA XM EVC RTNOPTHY: CPT | Performed by: INTERNAL MEDICINE

## 2022-11-15 PROCEDURE — 3078F DIAST BP <80 MM HG: CPT | Performed by: INTERNAL MEDICINE

## 2022-11-15 PROCEDURE — G8417 CALC BMI ABV UP PARAM F/U: HCPCS | Performed by: INTERNAL MEDICINE

## 2022-11-15 PROCEDURE — 3074F SYST BP LT 130 MM HG: CPT | Performed by: INTERNAL MEDICINE

## 2022-11-15 PROCEDURE — 3051F HG A1C>EQUAL 7.0%<8.0%: CPT | Performed by: INTERNAL MEDICINE

## 2022-11-15 PROCEDURE — 83036 HEMOGLOBIN GLYCOSYLATED A1C: CPT | Performed by: INTERNAL MEDICINE

## 2022-11-15 PROCEDURE — 3017F COLORECTAL CA SCREEN DOC REV: CPT | Performed by: INTERNAL MEDICINE

## 2022-11-15 RX ORDER — INSULIN GLARGINE 100 [IU]/ML
60 INJECTION, SOLUTION SUBCUTANEOUS EVERY MORNING
Qty: 24 ADJUSTABLE DOSE PRE-FILLED PEN SYRINGE | Refills: 2 | Status: SHIPPED | OUTPATIENT
Start: 2022-11-15

## 2022-11-15 RX ORDER — SEMAGLUTIDE 1.34 MG/ML
INJECTION, SOLUTION SUBCUTANEOUS
Qty: 4.5 ML | Refills: 3 | Status: SHIPPED | OUTPATIENT
Start: 2022-11-15

## 2022-11-15 RX ORDER — OXYCODONE AND ACETAMINOPHEN 7.5; 325 MG/1; MG/1
1 TABLET ORAL 2 TIMES DAILY PRN
Qty: 60 TABLET | Refills: 0 | Status: SHIPPED | OUTPATIENT
Start: 2022-11-15 | End: 2022-12-15

## 2022-11-15 RX ORDER — BLOOD-GLUCOSE TRANSMITTER
1 EACH MISCELLANEOUS ONCE
Qty: 1 EACH | Refills: 3 | Status: SHIPPED | OUTPATIENT
Start: 2022-11-15 | End: 2022-11-15

## 2022-11-15 RX ORDER — INSULIN LISPRO 100 [IU]/ML
INJECTION, SOLUTION INTRAVENOUS; SUBCUTANEOUS
Qty: 27 ADJUSTABLE DOSE PRE-FILLED PEN SYRINGE | Refills: 3 | Status: SHIPPED | OUTPATIENT
Start: 2022-11-15

## 2022-11-15 RX ORDER — GABAPENTIN 300 MG/1
300 CAPSULE ORAL 3 TIMES DAILY
Qty: 270 CAPSULE | Refills: 3 | Status: SHIPPED | OUTPATIENT
Start: 2022-11-15 | End: 2023-02-13

## 2022-11-15 RX ORDER — BLOOD-GLUCOSE SENSOR
EACH MISCELLANEOUS
Qty: 6 EACH | Refills: 3 | Status: SHIPPED | OUTPATIENT
Start: 2022-11-15

## 2022-11-15 NOTE — PROGRESS NOTES
[address] [place] [address] Department of Endocrinology Diabetes and Metabolism   [address] [facility] [address]   Phone: [phone]  Fax: [phone]    Date of Service: 11/15/2022  Primary Care Physician: Quynh Thornton MD  Provider: Francesca Wu MD    Reason for the visit:  DM type 2     History of Present Illness: The history is provided by the patient. No  was used. Accuracy of the patient data is excellent.   Len Romero is a very pleasant 46 y.o. male seen today for diabetes management     Len Romero was diagnosed with diabetes at age 25 and currently on Lantus 60 units daily in the AM, Humalog 12u with meals + high ss, Jardiance 25 mg daily, Ozempic 0.5 mg weekly   The patient has been checking blood sugar 3-4 times/day and readings are much better   Most recent A1c results summarized below  Lab Results   Component Value Date/Time    LABA1C 7.5 11/15/2022 09:42 AM    LABA1C 9.4 08/04/2022 07:00 AM    LABA1C 10.2 04/26/2022 11:41 AM     Patient has had no hypoglycemic episodes   The patient has been mindful of what has been eating and following diabetic diet as encouraged  I reviewed current medications and the patient has no issues with diabetes medications  Len Romero is up to date with eye exam and denied any history of diabetic retinopathy   The patient seeing podiatrist every  Few months S/p amputation Lt big toe on Lt and small toe on the RTt   Microvascular complications:  No Retinopathy, Nephropathy or Neuropathy   Macrovascular complications: + CAD s/p CABG   The patient receives Flushot every year and up to date with the Pneumonia vaccine     PAST MEDICAL HISTORY   Past Medical History:   Diagnosis Date    Anxiety     Atrial fibrillation, unspecified type ([address]     CAD (coronary artery disease)     Cardiomyopathy [address] [address]     CKD (chronic kidney disease)     Depression     HTN (hypertension)     Hyperlipidemia     LBBB (left bundle branch block)     Type 2 diabetes mellitus without complication ([facility] [address]        PAST SURGICAL HISTORY   Past Surgical History:   Procedure Laterality Date    CARDIAC SURGERY      CORONARY ARTERY BYPASS GRAFT  12/2020    EYE LID SURGERY      FOOT SURGERY Bilateral     FRACTURE SURGERY      OTHER SURGICAL HISTORY      atrial clip-has card in wallet    VEIN SURGERY         SOCIAL HISTORY   Tobacco:   reports that he has never smoked. His smokeless tobacco use includes chew. Alcohol:   reports that he does not currently use alcohol. Drugs:   reports no history of drug use. FAMILY HISTORY   Family History   Problem Relation Age of Onset    Diabetes Mother     Heart Attack Mother     Coronary Art Dis Father     Heart Attack Father        ALLERGIES AND DRUG REACTIONS   No Known Allergies    CURRENT MEDICATIONS   Current Outpatient Medications   Medication Sig Dispense Refill    furosemide (LASIX) 20 MG tablet Take 1 tablet by mouth daily as needed (LE edema or weight gain as reviewed) 30 tablet 2    sertraline (ZOLOFT) 25 MG tablet TAKE ONE TABLET BY MOUTH DAILY 90 tablet 1    metoprolol succinate (TOPROL XL) 25 MG extended release tablet TAKE 1 TABLET BY MOUTH ONCE DAILY FOR 90 DAYS 90 tablet 1    insulin glargine (LANTUS SOLOSTAR) 100 UNIT/ML injection pen Inject 60 Units into the skin every morning 6 Adjustable Dose Pre-filled Pen Syringe 2    busPIRone (BUSPAR) 15 MG tablet TAKE 1 TABLET BY MOUTH TWICE DAILY 180 tablet 1    empagliflozin (JARDIANCE) 25 MG tablet Indications: This medication can cause dehydration if blood sugars are not controlled. TAKE 1 TABLET BY MOUTH ONCE DAILY FOR 90 DAYS 90 tablet 1    gabapentin (NEURONTIN) 300 MG capsule Take 1 capsule by mouth 3 times daily for 90 days. 270 capsule 1    insulin lispro, 1 Unit Dial, (HUMALOG KWIKPEN) 100 UNIT/ML SOPN Inject 10 units with meals + following sliding scale.  -200 add 3U, -250 add 6U, -300 add 9U, -350 add 12U, -400 add 15U, BS over 400 add 18-20U. Max 90 units per day (Patient taking differently: Inject 12 units with meals + following sliding scale. -200 add 3U, -250 add 6U, -300 add 9U, -350 add 12U, -400 add 15U, BS over 400 add 18-20U. Max 90 units per day) 27 Adjustable Dose Pre-filled Pen Syringe 0    OZEMPIC, 0.25 OR 0.5 MG/DOSE, 2 MG/1.5ML SOPN INJECT 0.5MG SUBCUTANEOUSLY ONCE A WEEK 2 mL 3    rosuvastatin (CRESTOR) 40 MG tablet Take 1 tablet by mouth nightly 90 tablet 1    Insulin Pen Needle (EASY TOUCH PEN NEEDLES) 32G X 5 MM MISC USE DAILY WITH INSULIN PENS 400 each 1    ONETOUCH VERIO strip USE 1 STRIP TO CHECK GLUCOSE TWICE DAILY      vitamin D3 (CHOLECALCIFEROL) 125 MCG (5000 UT) TABS tablet Take 5,000 Units by mouth daily      aspirin 81 MG EC tablet Take 162 mg by mouth daily      Ascorbic Acid (VITAMIN C) 1000 MG tablet Take 1,000 mg by mouth daily      tadalafil (CIALIS) 20 MG tablet Take 1 tablet by mouth daily as needed for Erectile Dysfunction 30 tablet 1    sennosides-docusate sodium (SENOKOT-S) 8.6-50 MG tablet Take 2 tablets by mouth 2 times daily as needed for Constipation (Patient not taking: Reported on 11/15/2022) 50 tablet 0     No current facility-administered medications for this visit. Review of Systems  Constitutional: No fever, no chills, no diaphoresis, no generalized weakness. HEENT: No blurred vision, No sore throat, no ear pain, no hair loss  Neck: denied any neck swelling, difficulty swallowing,   Cardio-pulmonary: No CP, SOB or palpitation, No orthopnea or PND. No cough or wheezing. GI: No N/V/D, no constipation, No abdominal pain, no melena or hematochezia   : Denied any dysuria, hematuria, flank pain, discharge, or incontinence. Skin: denied any rash, ulcer, Hirsute, or hyperpigmentation. MSK: denied any joint deformity, joint pain/swelling, muscle pain, or back pain.   Neuro: no numbness, no tingling, no weakness, _    OBJECTIVE    /77   Pulse 90   Resp 16   Ht 6' 7\" (2.007 m)   Wt 274 lb (124.3 kg)   SpO2 98%   BMI 30.87 kg/m²   BP Readings from Last 4 Encounters:   11/15/22 138/77   10/12/22 136/82   08/17/22 101/69   08/05/22 (!) 165/110     Wt Readings from Last 6 Encounters:   11/15/22 274 lb (124.3 kg)   10/12/22 277 lb 9.6 oz (125.9 kg)   08/17/22 269 lb (122 kg)   08/04/22 269 lb (122 kg)   08/03/22 278 lb (126.1 kg)   05/13/22 268 lb (121.6 kg)       Physical examination:  General: awake alert, oriented x3, no abnormal position or movements. HEENT: normocephalic non-traumatic, no exophthalmos   Neck: supple, no LN enlargement, no thyromegaly, no thyroid tenderness, no JVD. Pulm: Clear equal air entry no added sounds, no wheezing or rhonchi    CVS: S1 + S2, no murmur, no heave. Dorsalis pedis pulse palpable   Abd: soft lax, no tenderness, no organomegaly, audible bowel sounds. Skin: warm, no lesions, no rash.  No callus, no Ulcers, No acanthosis nigricans  Musculoskeletal: No back tenderness, no kyphosis/scoliosis    Neuro: CN intact, Monofilament sensation decreased bilateral , muscle power normal  Psych: normal mood, and affect    Review of Laboratory Data:  I personally reviewed the following lab:  Lab Results   Component Value Date/Time    WBC 5.9 08/03/2022 05:42 AM    RBC 4.98 08/03/2022 05:42 AM    HGB 14.4 08/03/2022 05:42 AM    HCT 43.2 08/03/2022 05:42 AM    MCV 86.7 08/03/2022 05:42 AM    MCH 28.9 08/03/2022 05:42 AM    MCHC 33.3 08/03/2022 05:42 AM    RDW 12.9 08/03/2022 05:42 AM     08/03/2022 05:42 AM    MPV 9.7 08/03/2022 05:42 AM      Lab Results   Component Value Date/Time     08/03/2022 05:42 AM    K 4.0 08/03/2022 05:42 AM    CO2 27 08/03/2022 05:42 AM    BUN 16 08/03/2022 05:42 AM    CREATININE 1.0 08/03/2022 05:42 AM    CALCIUM 9.5 08/03/2022 05:42 AM    LABGLOM >60 08/03/2022 05:42 AM    GFRAA >60 08/03/2022 05:42 AM      No results found for: TSH, T4FREE, G4XURZY, FT3, X7WTVZK, TSI, TPOABS, THGAB  Lab Results   Component Value Date/Time    LABA1C 7.5 11/15/2022 09:42 AM    GLUCOSE 174 08/03/2022 05:42 AM    MALBCR - 04/26/2022 12:00 PM    LABMICR <12.0 04/26/2022 12:00 PM    LABCREA 31 04/26/2022 12:00 PM     Lab Results   Component Value Date/Time    LABA1C 7.5 11/15/2022 09:42 AM    LABA1C 9.4 08/04/2022 07:00 AM    LABA1C 10.2 04/26/2022 11:41 AM     Lab Results   Component Value Date/Time    TRIG 133 04/26/2022 12:00 PM    HDL 49 04/26/2022 12:00 PM    LDLCALC 114 04/26/2022 12:00 PM    CHOL 190 04/26/2022 12:00 PM     Lab Results   Component Value Date/Time    VITD25 44 04/26/2022 12:00 PM       ASSESSMENT & RECOMMENDATIONS   Dejan Patino, a 46 y.o.-old male seen in for the following issues     Diabetes Mellitus Type 2     Improving control   Continue Janrdiance 25 mg daily and Ozempic 0.5 mg/wk, Lantus 60u daily in AM, Humalog 12u with meals + ss 3:50>150   Continue checking blood sugars 4 times a day before meals and at bedtime and send BS readings to our office in a week. Discussed with patient A1c and blood sugar goals   Patient up to date with the routine diabetes maintenance and prevention  Labs before next visit     Dietary noncompliance  Discussed with patient the importance of eating consistent carbohydrate meals, avoiding high glycemic index food. Also, discussed with patient the risk and negative consequences of dietary noncompliance on blood glucose control, blood pressure and weight    HLD  Continue Crestor 40 mg daily     I personally reviewed external notes from PCP and other patient's care team providers, and personally interpreted labs associated with the above diagnosis. I also ordered labs to further assess and manage the above addressed medical conditions. No follow-ups on file. The above issues were reviewed with the patient who understood and agreed with the plan. Thank you for allowing us to participate in the care of this patient.  Please do not hesitate to contact us with any additional questions. Diagnosis Orders   1. Type 2 diabetes mellitus with diabetic polyneuropathy, with long-term current use of insulin (HCC)  POCT glycosylated hemoglobin (Hb A1C)    gabapentin (NEURONTIN) 300 MG capsule    insulin glargine (LANTUS SOLOSTAR) 100 UNIT/ML injection pen    insulin lispro, 1 Unit Dial, (HUMALOG KWIKPEN) 100 UNIT/ML SOPN    OZEMPIC, 0.25 OR 0.5 MG/DOSE, 2 MG/1.5ML SOPN    Continuous Blood Gluc Sensor (DEXCOM G6 SENSOR) MISC    Continuous Blood Gluc Transmit (DEXCOM G6 TRANSMITTER) MISC    Comprehensive Metabolic Panel    Hemoglobin A1C    Lipid Panel    Microalbumin / Creatinine Urine Ratio      2. Dietary noncompliance        3. Hyperlipidemia, unspecified hyperlipidemia type  Lipid Panel      4. Type 2 diabetes mellitus with diabetic peripheral angiopathy without gangrene, with long-term current use of insulin (HCC)  gabapentin (NEURONTIN) 300 MG capsule      5. Vitamin D deficiency  Vitamin D 25 Hydroxy    Comprehensive Metabolic Panel          Gal Leonard MD  Endocrinologist, [facility] Diabetes Care and Endocrinology   [address] [place] [address]   Phone: [phone]  Fax: [phone]  --------------------------------------------  An electronic signature was used to authenticate this note.  Ken Garcia MD on 11/15/2022 at 9:46 AM

## 2022-11-15 NOTE — TELEPHONE ENCOUNTER
Pt called needing refills on these mediations.      gabapentin (NEURONTIN) 300 MG capsule  Percocet 7.5   Jardiance     [facility] in [facility]

## 2022-11-17 ENCOUNTER — TELEPHONE (OUTPATIENT)
Dept: ENDOCRINOLOGY | Age: 52
End: 2022-11-17

## 2022-12-12 DIAGNOSIS — E11.51 TYPE 2 DIABETES MELLITUS WITH DIABETIC PERIPHERAL ANGIOPATHY WITHOUT GANGRENE, WITH LONG-TERM CURRENT USE OF INSULIN (HCC): ICD-10-CM

## 2022-12-12 DIAGNOSIS — M48.02 CERVICAL STENOSIS OF SPINE: ICD-10-CM

## 2022-12-12 DIAGNOSIS — S12.401D CLOSED NONDISPLACED FRACTURE OF FIFTH CERVICAL VERTEBRA WITH ROUTINE HEALING, UNSPECIFIED FRACTURE MORPHOLOGY, SUBSEQUENT ENCOUNTER: ICD-10-CM

## 2022-12-12 DIAGNOSIS — Z79.4 TYPE 2 DIABETES MELLITUS WITH DIABETIC POLYNEUROPATHY, WITH LONG-TERM CURRENT USE OF INSULIN (HCC): ICD-10-CM

## 2022-12-12 DIAGNOSIS — E11.42 TYPE 2 DIABETES MELLITUS WITH DIABETIC POLYNEUROPATHY, WITH LONG-TERM CURRENT USE OF INSULIN (HCC): ICD-10-CM

## 2022-12-12 DIAGNOSIS — Z79.4 TYPE 2 DIABETES MELLITUS WITH DIABETIC PERIPHERAL ANGIOPATHY WITHOUT GANGRENE, WITH LONG-TERM CURRENT USE OF INSULIN (HCC): ICD-10-CM

## 2022-12-12 RX ORDER — GABAPENTIN 300 MG/1
300 CAPSULE ORAL 3 TIMES DAILY
Qty: 270 CAPSULE | Refills: 3 | Status: CANCELLED | OUTPATIENT
Start: 2022-12-12 | End: 2023-03-12

## 2022-12-12 NOTE — TELEPHONE ENCOUNTER
Patient states he also wishes to know if the provider could call in a medication for his cold symptoms or recommend an OTC medication for him. His symptoms include a scratchy throat, and a slight cough at night. States he is also expelling green mucus. Stated he has had symptoms for 2 days. Notified him I would route the conversation to the provider. No further questions.

## 2022-12-14 RX ORDER — OXYCODONE AND ACETAMINOPHEN 7.5; 325 MG/1; MG/1
1 TABLET ORAL 2 TIMES DAILY PRN
Qty: 60 TABLET | Refills: 0 | Status: SHIPPED | OUTPATIENT
Start: 2022-12-14 | End: 2023-01-13

## 2023-01-16 ENCOUNTER — TELEPHONE (OUTPATIENT)
Dept: PRIMARY CARE CLINIC | Age: 53
End: 2023-01-16

## 2023-01-16 DIAGNOSIS — M48.02 CERVICAL STENOSIS OF SPINE: ICD-10-CM

## 2023-01-16 DIAGNOSIS — Z79.4 TYPE 2 DIABETES MELLITUS WITH DIABETIC POLYNEUROPATHY, WITH LONG-TERM CURRENT USE OF INSULIN (HCC): ICD-10-CM

## 2023-01-16 DIAGNOSIS — S12.401D CLOSED NONDISPLACED FRACTURE OF FIFTH CERVICAL VERTEBRA WITH ROUTINE HEALING, UNSPECIFIED FRACTURE MORPHOLOGY, SUBSEQUENT ENCOUNTER: ICD-10-CM

## 2023-01-16 DIAGNOSIS — E11.42 TYPE 2 DIABETES MELLITUS WITH DIABETIC POLYNEUROPATHY, WITH LONG-TERM CURRENT USE OF INSULIN (HCC): ICD-10-CM

## 2023-01-16 NOTE — TELEPHONE ENCOUNTER
Pt called into [facility], needs refill on [address] SOTOMAYOROCET . Has approx 4 days of medication for each day remaining. [facility] - [place].        .Electronically signed by Malia Quiñones on 1/16/23 at 9:49 AM EST

## 2023-01-16 NOTE — TELEPHONE ENCOUNTER
Last Appointment:  10/12/2022  Future Appointments   Date Time Provider Berta Barfield   3/15/2023 12:30 PM MOIZ Buenrostro - NP [facility]

## 2023-01-18 RX ORDER — OXYCODONE AND ACETAMINOPHEN 7.5; 325 MG/1; MG/1
1 TABLET ORAL 2 TIMES DAILY PRN
Qty: 60 TABLET | Refills: 0 | Status: SHIPPED | OUTPATIENT
Start: 2023-01-18 | End: 2023-02-17

## 2023-01-18 NOTE — TELEPHONE ENCOUNTER
Refills sent. What is he planning for follow up for the cervical stenosis?  The percocet is only temporary

## 2023-01-18 NOTE — TELEPHONE ENCOUNTER
Spoke to patient, stated that his Neurosurgeon is planning to have surgery to place a arvin in his neck, however the patient is unsure if he wants to have the procedure d/t decrease in ROM. States he plans to make an appointment today to be able to discuss this further with his neurosurgeon. Notified him the percocet is only temporary & patient stated he understood.

## 2023-03-15 ENCOUNTER — OFFICE VISIT (OUTPATIENT)
Dept: ENDOCRINOLOGY | Age: 53
End: 2023-03-15
Payer: MEDICARE

## 2023-03-15 VITALS
OXYGEN SATURATION: 100 % | BODY MASS INDEX: 30.89 KG/M2 | HEIGHT: 78 IN | HEART RATE: 77 BPM | WEIGHT: 267 LBS | DIASTOLIC BLOOD PRESSURE: 75 MMHG | SYSTOLIC BLOOD PRESSURE: 111 MMHG

## 2023-03-15 DIAGNOSIS — Z79.4 TYPE 2 DIABETES MELLITUS WITH DIABETIC POLYNEUROPATHY, WITH LONG-TERM CURRENT USE OF INSULIN (HCC): Primary | ICD-10-CM

## 2023-03-15 DIAGNOSIS — E78.5 HYPERLIPIDEMIA, UNSPECIFIED HYPERLIPIDEMIA TYPE: ICD-10-CM

## 2023-03-15 DIAGNOSIS — Z91.119 DIETARY NONCOMPLIANCE: ICD-10-CM

## 2023-03-15 DIAGNOSIS — E55.9 VITAMIN D DEFICIENCY: ICD-10-CM

## 2023-03-15 DIAGNOSIS — E11.42 TYPE 2 DIABETES MELLITUS WITH DIABETIC POLYNEUROPATHY, WITH LONG-TERM CURRENT USE OF INSULIN (HCC): Primary | ICD-10-CM

## 2023-03-15 LAB — HBA1C MFR BLD: 9.1 %

## 2023-03-15 PROCEDURE — G8427 DOCREV CUR MEDS BY ELIG CLIN: HCPCS | Performed by: NURSE PRACTITIONER

## 2023-03-15 PROCEDURE — 3074F SYST BP LT 130 MM HG: CPT | Performed by: NURSE PRACTITIONER

## 2023-03-15 PROCEDURE — G8484 FLU IMMUNIZE NO ADMIN: HCPCS | Performed by: NURSE PRACTITIONER

## 2023-03-15 PROCEDURE — 3017F COLORECTAL CA SCREEN DOC REV: CPT | Performed by: NURSE PRACTITIONER

## 2023-03-15 PROCEDURE — 83036 HEMOGLOBIN GLYCOSYLATED A1C: CPT | Performed by: NURSE PRACTITIONER

## 2023-03-15 PROCEDURE — 4004F PT TOBACCO SCREEN RCVD TLK: CPT | Performed by: NURSE PRACTITIONER

## 2023-03-15 PROCEDURE — 3046F HEMOGLOBIN A1C LEVEL >9.0%: CPT | Performed by: NURSE PRACTITIONER

## 2023-03-15 PROCEDURE — 2022F DILAT RTA XM EVC RTNOPTHY: CPT | Performed by: NURSE PRACTITIONER

## 2023-03-15 PROCEDURE — 99214 OFFICE O/P EST MOD 30 MIN: CPT | Performed by: NURSE PRACTITIONER

## 2023-03-15 PROCEDURE — 3078F DIAST BP <80 MM HG: CPT | Performed by: NURSE PRACTITIONER

## 2023-03-15 PROCEDURE — G8417 CALC BMI ABV UP PARAM F/U: HCPCS | Performed by: NURSE PRACTITIONER

## 2023-03-15 RX ORDER — BLOOD-GLUCOSE TRANSMITTER
EACH MISCELLANEOUS
Qty: 1 EACH | Refills: 4 | Status: SHIPPED | OUTPATIENT
Start: 2023-03-15

## 2023-03-15 RX ORDER — BLOOD-GLUCOSE SENSOR
EACH MISCELLANEOUS
Qty: 3 EACH | Refills: 3 | Status: SHIPPED | OUTPATIENT
Start: 2023-03-15

## 2023-03-15 RX ORDER — INSULIN GLARGINE 100 [IU]/ML
INJECTION, SOLUTION SUBCUTANEOUS
Qty: 24 ADJUSTABLE DOSE PRE-FILLED PEN SYRINGE | Refills: 2 | Status: SHIPPED | OUTPATIENT
Start: 2023-03-15

## 2023-03-15 RX ORDER — SEMAGLUTIDE 1.34 MG/ML
INJECTION, SOLUTION SUBCUTANEOUS
Qty: 1.5 ML | Refills: 3 | Status: SHIPPED | OUTPATIENT
Start: 2023-03-15

## 2023-03-15 NOTE — PROGRESS NOTES
[address] [facility] Department of Endocrinology Diabetes and Metabolism   [address] [facility] [address]   Phone: [phone]  Fax: [phone]    Date of Service: 3/15/2023  Primary Care Physician: Quynh Thornton MD  Provider: MOIZ Jackman NP    Reason for the visit:  DM type 2     History of Present Illness: The history is provided by the patient. No  was used. Accuracy of the patient data is excellent.   Len Romero is a very pleasant 46 y.o. male seen today for diabetes management     Len Romero was diagnosed with diabetes at age 25 and currently on Lantus 60 units daily in the AM, Humalog 12u with meals + high ss, Jardiance 25 mg daily,   He stopped Ozempic 0.5 mg 2 months ago to dizziness    The patient has been checking blood sugar 3-4 times/day Via Dexcom  but has not been wearing for 2 months   He has been checking his BS infrequently    Most recent A1c results summarized below  Lab Results   Component Value Date/Time    LABA1C 9.1 03/15/2023 12:26 PM    LABA1C 7.5 11/15/2022 09:42 AM    LABA1C 9.4 08/04/2022 07:00 AM     Patient has had no hypoglycemic episodes   The patient has been mindful of what has been eating and following diabetic diet as encouraged  I reviewed current medications and the patient has no issues with diabetes medications  Len Romero is up to date with eye exam and denied any history of diabetic retinopathy   The patient not  seeing podiatrist- S/p amputation Lt big toe on Lt and small toe on the RTt   Microvascular complications:  No Retinopathy, Nephropathy or Neuropathy   Macrovascular complications: + CAD s/p CABG   The patient receives Flushot every year and up to date with the Pneumonia vaccine     PAST MEDICAL HISTORY   Past Medical History:   Diagnosis Date    Anxiety     Atrial fibrillation, unspecified type ([address]     CAD (coronary artery disease)     Cardiomyopathy ([address]     CKD (chronic kidney disease)     Depression     HTN (hypertension)     Hyperlipidemia     LBBB (left bundle branch block)     Type 2 diabetes mellitus without complication ([facility] [address]        PAST SURGICAL HISTORY   Past Surgical History:   Procedure Laterality Date    CARDIAC SURGERY      CORONARY ARTERY BYPASS GRAFT  12/2020    EYE LID SURGERY      FOOT SURGERY Bilateral     FRACTURE SURGERY      OTHER SURGICAL HISTORY      atrial clip-has card in wallet    VEIN SURGERY         SOCIAL HISTORY   Tobacco:   reports that he has never smoked. His smokeless tobacco use includes chew. Alcohol:   reports that he does not currently use alcohol. Drugs:   reports no history of drug use. FAMILY HISTORY   Family History   Problem Relation Age of Onset    Diabetes Mother     Heart Attack Mother     Coronary Art Dis Father     Heart Attack Father        ALLERGIES AND DRUG REACTIONS   No Known Allergies    CURRENT MEDICATIONS   Current Outpatient Medications   Medication Sig Dispense Refill    Continuous Blood Gluc Sensor (DEXCOM G6 SENSOR) MISC Change every 10 days 3 each 3    Continuous Blood Gluc Transmit (DEXCOM G6 TRANSMITTER) MISC Change every 90 days 1 each 4    Semaglutide,0.25 or 0.5MG/DOS, (OZEMPIC, 0.25 OR 0.5 MG/DOSE,) 2 MG/1.5ML SOPN Inject 0.25 mg weekly x 2 weeks then start 0.5 mg weekly on week 3 1.5 mL 3    empagliflozin (JARDIANCE) 25 MG tablet Indications: This medication can cause dehydration if blood sugars are not controlled. TAKE 1 TABLET BY MOUTH ONCE DAILY FOR 90 DAYS 90 tablet 1    insulin glargine (LANTUS SOLOSTAR) 100 UNIT/ML injection pen Inject 65 units at night 24 Adjustable Dose Pre-filled Pen Syringe 2    insulin lispro, 1 Unit Dial, (HUMALOG KWIKPEN) 100 UNIT/ML SOPN Inject 12 units with meals + following sliding scale. -200 add 3U, -250 add 6U, -300 add 9U, -350 add 12U, -400 add 15U, BS over 400 add 18-20U.  Max 90 units per day 27 Adjustable Dose Pre-filled Pen Syringe 3    metoprolol succinate (TOPROL XL) 25 MG extended release tablet TAKE 1 TABLET BY MOUTH ONCE DAILY FOR 90 DAYS 90 tablet 1    busPIRone (BUSPAR) 15 MG tablet TAKE 1 TABLET BY MOUTH TWICE DAILY 180 tablet 1    rosuvastatin (CRESTOR) 40 MG tablet Take 1 tablet by mouth nightly 90 tablet 1    vitamin D3 (CHOLECALCIFEROL) 125 MCG (5000 UT) TABS tablet Take 5,000 Units by mouth daily      aspirin 81 MG EC tablet Take 162 mg by mouth daily      Ascorbic Acid (VITAMIN C) 1000 MG tablet Take 1,000 mg by mouth daily      gabapentin (NEURONTIN) 300 MG capsule Take 1 capsule by mouth 3 times daily for 90 days. 270 capsule 3    OZEMPIC, 0.25 OR 0.5 MG/DOSE, 2 MG/1.5ML SOPN INJECT 0.5MG SUBCUTANEOUSLY ONCE A WEEK (Patient not taking: Reported on 3/15/2023) 4.5 mL 3    Continuous Blood Gluc Sensor (DEXCOM G6 SENSOR) MISC Change sensors every 10 days (Patient not taking: Reported on 3/15/2023) 6 each 3    furosemide (LASIX) 20 MG tablet Take 1 tablet by mouth daily as needed (LE edema or weight gain as reviewed) 30 tablet 2    sertraline (ZOLOFT) 25 MG tablet TAKE ONE TABLET BY MOUTH DAILY 90 tablet 1    Insulin Pen Needle (EASY TOUCH PEN NEEDLES) 32G X 5 MM MISC USE DAILY WITH INSULIN PENS 400 each 1    sennosides-docusate sodium (SENOKOT-S) 8.6-50 MG tablet Take 2 tablets by mouth 2 times daily as needed for Constipation (Patient not taking: Reported on 11/15/2022) 50 tablet 0    ONETOUCH VERIO strip USE 1 STRIP TO CHECK GLUCOSE TWICE DAILY      tadalafil (CIALIS) 20 MG tablet Take 1 tablet by mouth daily as needed for Erectile Dysfunction 30 tablet 1     No current facility-administered medications for this visit. Review of Systems  Constitutional: No fever, no chills, no diaphoresis, no generalized weakness. HEENT: No blurred vision, No sore throat, no ear pain, no hair loss  Neck: denied any neck swelling, difficulty swallowing,   Cardio-pulmonary: No CP, SOB or palpitation, No orthopnea or PND. No cough or wheezing.   GI: No N/V/D, no constipation, No abdominal pain, no melena or hematochezia   : Denied any dysuria, hematuria, flank pain, discharge, or incontinence. Skin: denied any rash, ulcer, Hirsute, or hyperpigmentation. MSK: denied any joint deformity, joint pain/swelling, muscle pain, or back pain. Neuro: no numbness, no tingling, no weakness    OBJECTIVE    /75   Pulse 77   Ht 6' 7\" (2.007 m)   Wt 267 lb (121.1 kg)   SpO2 100%   BMI 30.08 kg/m²   BP Readings from Last 4 Encounters:   03/15/23 111/75   11/15/22 138/77   10/12/22 136/82   08/17/22 101/69     Wt Readings from Last 6 Encounters:   03/15/23 267 lb (121.1 kg)   11/15/22 274 lb (124.3 kg)   10/12/22 277 lb 9.6 oz (125.9 kg)   08/17/22 269 lb (122 kg)   08/04/22 269 lb (122 kg)   08/03/22 278 lb (126.1 kg)       Physical examination:  General: awake alert, oriented x3, no abnormal position or movements. HEENT: normocephalic non-traumatic, no exophthalmos   Neck: supple, no LN enlargement, no thyromegaly, no thyroid tenderness, no JVD. Pulm: Clear equal air entry no added sounds, no wheezing or rhonchi    CVS: S1 + S2, no murmur, no heave. Dorsalis pedis pulse palpable   Abd: soft lax, no tenderness, no organomegaly, audible bowel sounds. Skin: warm, no lesions, no rash.  No callus, no Ulcers, No acanthosis nigricans  Musculoskeletal: No back tenderness, no kyphosis/scoliosis    Neuro: CN intact, sensation decreased bilateral , muscle power normal  Psych: normal mood, and affect    Review of Laboratory Data:  I personally reviewed the following lab:  Lab Results   Component Value Date/Time    WBC 5.9 08/03/2022 05:42 AM    RBC 4.98 08/03/2022 05:42 AM    HGB 14.4 08/03/2022 05:42 AM    HCT 43.2 08/03/2022 05:42 AM    MCV 86.7 08/03/2022 05:42 AM    MCH 28.9 08/03/2022 05:42 AM    MCHC 33.3 08/03/2022 05:42 AM    RDW 12.9 08/03/2022 05:42 AM     08/03/2022 05:42 AM    MPV 9.7 08/03/2022 05:42 AM      Lab Results   Component Value Date/Time     08/03/2022 05:42 AM    K 4.0 08/03/2022 05:42 AM    CO2 27 08/03/2022 05:42 AM    BUN 16 08/03/2022 05:42 AM    CREATININE 1.0 08/03/2022 05:42 AM    CALCIUM 9.5 08/03/2022 05:42 AM    LABGLOM >60 08/03/2022 05:42 AM    GFRAA >60 08/03/2022 05:42 AM      No results found for: TSH, T4FREE, N0QVAOP, FT3, Q1TATZJ, TSI, TPOABS, THGAB  Lab Results   Component Value Date/Time    LABA1C 9.1 03/15/2023 12:26 PM    GLUCOSE 174 08/03/2022 05:42 AM    MALBCR - 04/26/2022 12:00 PM    LABMICR <12.0 04/26/2022 12:00 PM    LABCREA 31 04/26/2022 12:00 PM     Lab Results   Component Value Date/Time    LABA1C 9.1 03/15/2023 12:26 PM    LABA1C 7.5 11/15/2022 09:42 AM    LABA1C 9.4 08/04/2022 07:00 AM     Lab Results   Component Value Date/Time    TRIG 133 04/26/2022 12:00 PM    HDL 49 04/26/2022 12:00 PM    LDLCALC 114 04/26/2022 12:00 PM    CHOL 190 04/26/2022 12:00 PM     Lab Results   Component Value Date/Time    VITD25 44 04/26/2022 12:00 PM       ASSESSMENT & RECOMMENDATIONS   Dejan Patino, a 46 y.o.-old male seen in for the following issues     Diabetes Mellitus Type 2     [place] control 7.5% -> 9.1% due to noncompliance   Continue Janrdiance 25 mg daily Lantus 65u daily in AM, Humalog 12u with meals + ss 3:50>150   Resume Ozempic 0.25 mg/wk x 2 weeks then increase to 0.5 mg weekly    Continue checking blood sugars 4 times a day before meals and at bedtime and send BS readings to our office in a week. Resume Dexcom  Discussed with patient A1c and blood sugar goals   Patient up to date with the routine diabetes maintenance and prevention  Labs before next visit     Dietary noncompliance  Discussed with patient the importance of eating consistent carbohydrate meals, avoiding high glycemic index food.  Also, discussed with patient the risk and negative consequences of dietary noncompliance on blood glucose control, blood pressure and weight    HLD  Continue Crestor 40 mg daily     I personally reviewed external notes from PCP and other patient's care team providers, and personally interpreted labs associated with the above diagnosis. I also ordered labs to further assess and manage the above addressed medical conditions. Return in about 4 weeks (around 4/12/2023) for type 2 DM . The above issues were reviewed with the patient who understood and agreed with the plan. Thank you for allowing us to participate in the care of this patient. Please do not hesitate to contact us with any additional questions. Diagnosis Orders   1. Type 2 diabetes mellitus with diabetic polyneuropathy, with long-term current use of insulin (HCC)  POCT glycosylated hemoglobin (Hb A1C)    Semaglutide,0.25 or 0.5MG/DOS, (OZEMPIC, 0.25 OR 0.5 MG/DOSE,) 2 MG/1.5ML SOPN    empagliflozin (JARDIANCE) 25 MG tablet    insulin glargine (LANTUS SOLOSTAR) 100 UNIT/ML injection pen      2. Dietary noncompliance        3. Hyperlipidemia, unspecified hyperlipidemia type        4. Vitamin D deficiency            MOIZ Bee NP    [facility] Diabetes Care and Endocrinology   [address] [place] [address]   Phone: [phone]  Fax: [phone]  --------------------------------------------  An electronic signature was used to authenticate this note.  MOIZ Bee NP  3/15/2023 at 12:51 PM

## 2023-03-30 DIAGNOSIS — E11.42 TYPE 2 DIABETES MELLITUS WITH DIABETIC POLYNEUROPATHY, WITH LONG-TERM CURRENT USE OF INSULIN (HCC): ICD-10-CM

## 2023-03-30 DIAGNOSIS — Z95.1 HISTORY OF FOUR VESSEL CORONARY ARTERY BYPASS GRAFT: ICD-10-CM

## 2023-03-30 DIAGNOSIS — F41.1 GAD (GENERALIZED ANXIETY DISORDER): ICD-10-CM

## 2023-03-30 DIAGNOSIS — Z79.4 TYPE 2 DIABETES MELLITUS WITH DIABETIC POLYNEUROPATHY, WITH LONG-TERM CURRENT USE OF INSULIN (HCC): ICD-10-CM

## 2023-03-30 RX ORDER — BUSPIRONE HYDROCHLORIDE 15 MG/1
TABLET ORAL
Qty: 180 TABLET | Refills: 1 | Status: SHIPPED | OUTPATIENT
Start: 2023-03-30

## 2023-03-30 RX ORDER — METOPROLOL SUCCINATE 25 MG/1
TABLET, EXTENDED RELEASE ORAL
Qty: 90 TABLET | Refills: 1 | Status: SHIPPED | OUTPATIENT
Start: 2023-03-30

## 2023-03-30 RX ORDER — ROSUVASTATIN CALCIUM 40 MG/1
40 TABLET, COATED ORAL NIGHTLY
Qty: 90 TABLET | Refills: 1 | Status: SHIPPED | OUTPATIENT
Start: 2023-03-30

## 2023-03-30 RX ORDER — SERTRALINE HYDROCHLORIDE 25 MG/1
TABLET, FILM COATED ORAL
Qty: 90 TABLET | Refills: 1 | Status: SHIPPED | OUTPATIENT
Start: 2023-03-30

## 2023-03-30 NOTE — TELEPHONE ENCOUNTER
Patient called asking for refills on Metoprolol, Buspirone, Rosuvastatin, Sertraline, Jardiance. Tom Phan. He can be reached at [phone]. Thank you    . Electronically signed by Sherice Pate on 3/30/2023 at 11:48 AM

## 2023-03-31 RX ORDER — PROCHLORPERAZINE 25 MG/1
SUPPOSITORY RECTAL
Qty: 1 EACH | Refills: 4 | Status: SHIPPED | OUTPATIENT
Start: 2023-03-31

## 2023-03-31 RX ORDER — PROCHLORPERAZINE 25 MG/1
SUPPOSITORY RECTAL
Qty: 3 EACH | Refills: 3 | Status: SHIPPED | OUTPATIENT
Start: 2023-03-31

## 2023-04-18 DIAGNOSIS — Z79.4 TYPE 2 DIABETES MELLITUS WITH DIABETIC POLYNEUROPATHY, WITH LONG-TERM CURRENT USE OF INSULIN (HCC): Primary | ICD-10-CM

## 2023-04-18 DIAGNOSIS — E11.42 TYPE 2 DIABETES MELLITUS WITH DIABETIC POLYNEUROPATHY, WITH LONG-TERM CURRENT USE OF INSULIN (HCC): Primary | ICD-10-CM

## 2023-04-21 DIAGNOSIS — Z79.4 TYPE 2 DIABETES MELLITUS WITH DIABETIC POLYNEUROPATHY, WITH LONG-TERM CURRENT USE OF INSULIN (HCC): Primary | ICD-10-CM

## 2023-04-21 DIAGNOSIS — E11.42 TYPE 2 DIABETES MELLITUS WITH DIABETIC POLYNEUROPATHY, WITH LONG-TERM CURRENT USE OF INSULIN (HCC): Primary | ICD-10-CM

## 2023-04-21 RX ORDER — SEMAGLUTIDE 0.68 MG/ML
0.5 INJECTION, SOLUTION SUBCUTANEOUS WEEKLY
Qty: 3 ML | Refills: 3 | OUTPATIENT
Start: 2023-04-21

## 2023-06-01 DIAGNOSIS — F41.1 GAD (GENERALIZED ANXIETY DISORDER): ICD-10-CM

## 2023-06-01 DIAGNOSIS — Z95.1 HISTORY OF FOUR VESSEL CORONARY ARTERY BYPASS GRAFT: ICD-10-CM

## 2023-06-01 RX ORDER — BUSPIRONE HYDROCHLORIDE 15 MG/1
TABLET ORAL
Qty: 180 TABLET | Refills: 1 | Status: SHIPPED | OUTPATIENT
Start: 2023-06-01

## 2023-06-01 RX ORDER — ROSUVASTATIN CALCIUM 40 MG/1
40 TABLET, COATED ORAL NIGHTLY
Qty: 90 TABLET | Refills: 1 | Status: SHIPPED | OUTPATIENT
Start: 2023-06-01

## 2023-06-01 NOTE — TELEPHONE ENCOUNTER
Last Appointment:  10/12/2022  Future Appointments   Date Time Provider Berta Carolyne   6/2/2023 12:30 PM Jag Mendoza MD [facility]   8/15/2023 12:00 PM MOIZ Trivedi - CNS BDM [facility] Unknown

## 2023-06-01 NOTE — TELEPHONE ENCOUNTER
----- Message from Carolee Breen sent at 5/31/2023 12:59 PM EDT -----  Subject: Refill Request    QUESTIONS  Name of Medication? rosuvastatin (CRESTOR) 40 MG tablet  Patient-reported dosage and instructions? 40MG, 1 Tablet Daily  How many days do you have left? 2  Preferred Pharmacy? [address] [place] [address] phone number (if available)? [phone]  ---------------------------------------------------------------------------  --------------,  Name of Medication? busPIRone (BUSPAR) 15 MG tablet  Patient-reported dosage and instructions? 15MG, TAKE 1 TABLET BY MOUTH   TWICE DAILY  How many days do you have left? 2  Preferred Pharmacy? [facility]  Pharmacy phone number (if available)? [phone]  ---------------------------------------------------------------------------  --------------  CALL BACK INFO  What is the best way for the office to contact you? OK to leave message on   voicemail  Preferred Call Back Phone Number? [phone]  ---------------------------------------------------------------------------  --------------  SCRIPT ANSWERS  Relationship to Patient?  Self

## 2023-06-02 ENCOUNTER — OFFICE VISIT (OUTPATIENT)
Dept: PRIMARY CARE CLINIC | Age: 53
End: 2023-06-02
Payer: MEDICARE

## 2023-06-02 VITALS
HEART RATE: 71 BPM | RESPIRATION RATE: 16 BRPM | HEIGHT: 78 IN | OXYGEN SATURATION: 97 % | TEMPERATURE: 97.7 F | BODY MASS INDEX: 30.82 KG/M2 | DIASTOLIC BLOOD PRESSURE: 76 MMHG | WEIGHT: 266.4 LBS | SYSTOLIC BLOOD PRESSURE: 122 MMHG

## 2023-06-02 DIAGNOSIS — G89.29 OTHER CHRONIC PAIN: Primary | ICD-10-CM

## 2023-06-02 DIAGNOSIS — F33.1 MODERATE RECURRENT MAJOR DEPRESSION (HCC): ICD-10-CM

## 2023-06-02 DIAGNOSIS — M48.02 CERVICAL STENOSIS OF SPINE: ICD-10-CM

## 2023-06-02 PROCEDURE — 3017F COLORECTAL CA SCREEN DOC REV: CPT | Performed by: FAMILY MEDICINE

## 2023-06-02 PROCEDURE — G8417 CALC BMI ABV UP PARAM F/U: HCPCS | Performed by: FAMILY MEDICINE

## 2023-06-02 PROCEDURE — 4004F PT TOBACCO SCREEN RCVD TLK: CPT | Performed by: FAMILY MEDICINE

## 2023-06-02 PROCEDURE — 3074F SYST BP LT 130 MM HG: CPT | Performed by: FAMILY MEDICINE

## 2023-06-02 PROCEDURE — G8427 DOCREV CUR MEDS BY ELIG CLIN: HCPCS | Performed by: FAMILY MEDICINE

## 2023-06-02 PROCEDURE — 99214 OFFICE O/P EST MOD 30 MIN: CPT | Performed by: FAMILY MEDICINE

## 2023-06-02 PROCEDURE — 3078F DIAST BP <80 MM HG: CPT | Performed by: FAMILY MEDICINE

## 2023-06-02 RX ORDER — OXYCODONE AND ACETAMINOPHEN 7.5; 325 MG/1; MG/1
1 TABLET ORAL 2 TIMES DAILY PRN
Qty: 60 TABLET | Refills: 0 | Status: SHIPPED | OUTPATIENT
Start: 2023-06-02 | End: 2023-07-02

## 2023-06-02 SDOH — ECONOMIC STABILITY: FOOD INSECURITY: WITHIN THE PAST 12 MONTHS, YOU WORRIED THAT YOUR FOOD WOULD RUN OUT BEFORE YOU GOT MONEY TO BUY MORE.: NEVER TRUE

## 2023-06-02 SDOH — ECONOMIC STABILITY: HOUSING INSECURITY
IN THE LAST 12 MONTHS, WAS THERE A TIME WHEN YOU DID NOT HAVE A STEADY PLACE TO SLEEP OR SLEPT IN A SHELTER (INCLUDING NOW)?: NO

## 2023-06-02 SDOH — ECONOMIC STABILITY: FOOD INSECURITY: WITHIN THE PAST 12 MONTHS, THE FOOD YOU BOUGHT JUST DIDN'T LAST AND YOU DIDN'T HAVE MONEY TO GET MORE.: NEVER TRUE

## 2023-06-02 SDOH — ECONOMIC STABILITY: INCOME INSECURITY: HOW HARD IS IT FOR YOU TO PAY FOR THE VERY BASICS LIKE FOOD, HOUSING, MEDICAL CARE, AND HEATING?: NOT HARD AT ALL

## 2023-06-02 ASSESSMENT — PATIENT HEALTH QUESTIONNAIRE - PHQ9
10. IF YOU CHECKED OFF ANY PROBLEMS, HOW DIFFICULT HAVE THESE PROBLEMS MADE IT FOR YOU TO DO YOUR WORK, TAKE CARE OF THINGS AT HOME, OR GET ALONG WITH OTHER PEOPLE: 3
SUM OF ALL RESPONSES TO PHQ QUESTIONS 1-9: 17
7. TROUBLE CONCENTRATING ON THINGS, SUCH AS READING THE NEWSPAPER OR WATCHING TELEVISION: 3
5. POOR APPETITE OR OVEREATING: 0
SUM OF ALL RESPONSES TO PHQ QUESTIONS 1-9: 17
SUM OF ALL RESPONSES TO PHQ QUESTIONS 1-9: 17
2. FEELING DOWN, DEPRESSED OR HOPELESS: 1
SUM OF ALL RESPONSES TO PHQ9 QUESTIONS 1 & 2: 3
4. FEELING TIRED OR HAVING LITTLE ENERGY: 3
9. THOUGHTS THAT YOU WOULD BE BETTER OFF DEAD, OR OF HURTING YOURSELF: 0
3. TROUBLE FALLING OR STAYING ASLEEP: 3
6. FEELING BAD ABOUT YOURSELF - OR THAT YOU ARE A FAILURE OR HAVE LET YOURSELF OR YOUR FAMILY DOWN: 3
8. MOVING OR SPEAKING SO SLOWLY THAT OTHER PEOPLE COULD HAVE NOTICED. OR THE OPPOSITE, BEING SO FIGETY OR RESTLESS THAT YOU HAVE BEEN MOVING AROUND A LOT MORE THAN USUAL: 2
1. LITTLE INTEREST OR PLEASURE IN DOING THINGS: 2
SUM OF ALL RESPONSES TO PHQ QUESTIONS 1-9: 17

## 2023-06-09 PROBLEM — M48.02 CERVICAL STENOSIS OF SPINE: Status: ACTIVE | Noted: 2023-06-09

## 2023-06-09 PROBLEM — G89.29 OTHER CHRONIC PAIN: Status: ACTIVE | Noted: 2023-06-09

## 2023-06-09 ASSESSMENT — ENCOUNTER SYMPTOMS: RESPIRATORY NEGATIVE: 1

## 2023-07-07 DIAGNOSIS — M48.02 CERVICAL STENOSIS OF SPINE: ICD-10-CM

## 2023-07-07 RX ORDER — OXYCODONE AND ACETAMINOPHEN 7.5; 325 MG/1; MG/1
1 TABLET ORAL 2 TIMES DAILY PRN
Qty: 60 TABLET | Refills: 0 | Status: SHIPPED | OUTPATIENT
Start: 2023-07-07 | End: 2023-08-06

## 2023-07-07 NOTE — TELEPHONE ENCOUNTER
Last Appointment:  6/2/2023  Future Appointments   Date Time Provider [address]   8/2/2023 12:30 PM Latoya Melton MD [facility]   8/15/2023 12:00 PM MOIZ Marquez [facility]      Patient is taking Oxycodone and is requesting a refill. Patient states it is helping him sleep when he takes them at night. No side effects at this time. Had side effects on Zoloft (nausea/vomiting) first week of taking medication but is doing much better. Will discuss further at next OV.

## 2023-08-02 ENCOUNTER — OFFICE VISIT (OUTPATIENT)
Dept: PRIMARY CARE CLINIC | Age: 53
End: 2023-08-02
Payer: MEDICARE

## 2023-08-02 VITALS
OXYGEN SATURATION: 98 % | BODY MASS INDEX: 30.34 KG/M2 | TEMPERATURE: 97.9 F | RESPIRATION RATE: 16 BRPM | SYSTOLIC BLOOD PRESSURE: 110 MMHG | DIASTOLIC BLOOD PRESSURE: 70 MMHG | WEIGHT: 262.2 LBS | HEIGHT: 78 IN | HEART RATE: 82 BPM

## 2023-08-02 DIAGNOSIS — F33.1 MODERATE RECURRENT MAJOR DEPRESSION (HCC): ICD-10-CM

## 2023-08-02 DIAGNOSIS — E11.610 CHARCOT FOOT DUE TO DIABETES MELLITUS (HCC): Primary | ICD-10-CM

## 2023-08-02 DIAGNOSIS — M48.02 CERVICAL STENOSIS OF SPINE: ICD-10-CM

## 2023-08-02 PROCEDURE — 3074F SYST BP LT 130 MM HG: CPT | Performed by: FAMILY MEDICINE

## 2023-08-02 PROCEDURE — G8427 DOCREV CUR MEDS BY ELIG CLIN: HCPCS | Performed by: FAMILY MEDICINE

## 2023-08-02 PROCEDURE — 99214 OFFICE O/P EST MOD 30 MIN: CPT | Performed by: FAMILY MEDICINE

## 2023-08-02 PROCEDURE — 3052F HG A1C>EQUAL 8.0%<EQUAL 9.0%: CPT | Performed by: FAMILY MEDICINE

## 2023-08-02 PROCEDURE — 3017F COLORECTAL CA SCREEN DOC REV: CPT | Performed by: FAMILY MEDICINE

## 2023-08-02 PROCEDURE — G8417 CALC BMI ABV UP PARAM F/U: HCPCS | Performed by: FAMILY MEDICINE

## 2023-08-02 PROCEDURE — 2022F DILAT RTA XM EVC RTNOPTHY: CPT | Performed by: FAMILY MEDICINE

## 2023-08-02 PROCEDURE — 3078F DIAST BP <80 MM HG: CPT | Performed by: FAMILY MEDICINE

## 2023-08-02 PROCEDURE — 4004F PT TOBACCO SCREEN RCVD TLK: CPT | Performed by: FAMILY MEDICINE

## 2023-08-02 RX ORDER — OXYCODONE AND ACETAMINOPHEN 7.5; 325 MG/1; MG/1
1 TABLET ORAL 2 TIMES DAILY PRN
Qty: 60 TABLET | Refills: 0 | Status: SHIPPED | OUTPATIENT
Start: 2023-08-06 | End: 2023-09-05

## 2023-08-02 ASSESSMENT — PATIENT HEALTH QUESTIONNAIRE - PHQ9
SUM OF ALL RESPONSES TO PHQ QUESTIONS 1-9: 0
9. THOUGHTS THAT YOU WOULD BE BETTER OFF DEAD, OR OF HURTING YOURSELF: 0
10. IF YOU CHECKED OFF ANY PROBLEMS, HOW DIFFICULT HAVE THESE PROBLEMS MADE IT FOR YOU TO DO YOUR WORK, TAKE CARE OF THINGS AT HOME, OR GET ALONG WITH OTHER PEOPLE: 0
3. TROUBLE FALLING OR STAYING ASLEEP: 0
7. TROUBLE CONCENTRATING ON THINGS, SUCH AS READING THE NEWSPAPER OR WATCHING TELEVISION: 0
1. LITTLE INTEREST OR PLEASURE IN DOING THINGS: 0
5. POOR APPETITE OR OVEREATING: 0
SUM OF ALL RESPONSES TO PHQ QUESTIONS 1-9: 0
SUM OF ALL RESPONSES TO PHQ9 QUESTIONS 1 & 2: 0
2. FEELING DOWN, DEPRESSED OR HOPELESS: 0
SUM OF ALL RESPONSES TO PHQ QUESTIONS 1-9: 0
SUM OF ALL RESPONSES TO PHQ QUESTIONS 1-9: 0
8. MOVING OR SPEAKING SO SLOWLY THAT OTHER PEOPLE COULD HAVE NOTICED. OR THE OPPOSITE, BEING SO FIGETY OR RESTLESS THAT YOU HAVE BEEN MOVING AROUND A LOT MORE THAN USUAL: 0
6. FEELING BAD ABOUT YOURSELF - OR THAT YOU ARE A FAILURE OR HAVE LET YOURSELF OR YOUR FAMILY DOWN: 0
4. FEELING TIRED OR HAVING LITTLE ENERGY: 0

## 2023-08-02 ASSESSMENT — ENCOUNTER SYMPTOMS: RESPIRATORY NEGATIVE: 1

## 2023-08-02 NOTE — PROGRESS NOTES
Beulah Began Carey  : 1970    Chief Complaint:     Chief Complaint   Patient presents with    Follow-up     Patient states he is here for follow up visit and to discuss medication. HPI  Refill of pain medication. He has followed with Neurosurgery for his cervical stenosis C3-C7 and the recommendation is to have surgery due to signs of myelopathy. He has reviewed with Dr Saba Arnold and myself on the risks of not having the surgery including progressive pain and permanent nerve damage/paralysis. He has no interest in having spine surgery. He does not want injections. He has been prescribed Percocet, which he has used PRN for significant pain and never more than what is prescribed. It has helped tremendously with his sleep, he is able to rest through the night. His mood is much better with pain control and the increase in antidepressant as well. Controlled Substance Monitoring:    Acute and Chronic Pain Monitoring:   RX Monitoring 23   Periodic Controlled Substance Monitoring Possible medication side effects, risk of tolerance/dependence & alternative treatments discussed. ;Obtaining appropriate analgesic effect of treatment. OARRS consistent. Chronic Pain Consulted with a specialist.  Signed pain contract today     Depression much improved.    PHQ Scores 2023   PHQ2 Score 0 3 0 0   PHQ9 Score 0 17 0 0       Past medical, surgical, family and social histories reviewed and updated today as appropriate    Health Maintenance Due   Topic Date Due    Pneumococcal 0-64 years Vaccine (1 - PCV) Never done    Diabetic foot exam  Never done    HIV screen  Never done    Diabetic retinal exam  Never done    Hepatitis C screen  Never done    Hepatitis B vaccine (1 of 3 - Risk 3-dose series) 1989    DTaP/Tdap/Td vaccine (1 - Tdap) Never done    Colorectal Cancer Screen  Never done    Shingles vaccine (1 of 2) Never done    COVID-19 Vaccine (4 - Booster for Moderna series)

## 2023-09-05 ENCOUNTER — OFFICE VISIT (OUTPATIENT)
Dept: PRIMARY CARE CLINIC | Age: 53
End: 2023-09-05
Payer: MEDICARE

## 2023-09-05 VITALS
HEIGHT: 78 IN | OXYGEN SATURATION: 99 % | DIASTOLIC BLOOD PRESSURE: 66 MMHG | RESPIRATION RATE: 20 BRPM | TEMPERATURE: 98 F | HEART RATE: 84 BPM | BODY MASS INDEX: 30.66 KG/M2 | SYSTOLIC BLOOD PRESSURE: 120 MMHG | WEIGHT: 265 LBS

## 2023-09-05 DIAGNOSIS — M48.02 CERVICAL STENOSIS OF SPINE: ICD-10-CM

## 2023-09-05 DIAGNOSIS — Z95.1 HISTORY OF FOUR VESSEL CORONARY ARTERY BYPASS GRAFT: ICD-10-CM

## 2023-09-05 DIAGNOSIS — E11.42 TYPE 2 DIABETES MELLITUS WITH DIABETIC POLYNEUROPATHY, WITH LONG-TERM CURRENT USE OF INSULIN (HCC): ICD-10-CM

## 2023-09-05 DIAGNOSIS — Z79.4 TYPE 2 DIABETES MELLITUS WITH DIABETIC POLYNEUROPATHY, WITH LONG-TERM CURRENT USE OF INSULIN (HCC): ICD-10-CM

## 2023-09-05 DIAGNOSIS — E11.621 DIABETIC ULCER OF LEFT HEEL ASSOCIATED WITH TYPE 2 DIABETES MELLITUS, LIMITED TO BREAKDOWN OF SKIN (HCC): Primary | ICD-10-CM

## 2023-09-05 DIAGNOSIS — L97.421 DIABETIC ULCER OF LEFT HEEL ASSOCIATED WITH TYPE 2 DIABETES MELLITUS, LIMITED TO BREAKDOWN OF SKIN (HCC): Primary | ICD-10-CM

## 2023-09-05 PROCEDURE — 4004F PT TOBACCO SCREEN RCVD TLK: CPT | Performed by: NURSE PRACTITIONER

## 2023-09-05 PROCEDURE — 3052F HG A1C>EQUAL 8.0%<EQUAL 9.0%: CPT | Performed by: NURSE PRACTITIONER

## 2023-09-05 PROCEDURE — 3078F DIAST BP <80 MM HG: CPT | Performed by: NURSE PRACTITIONER

## 2023-09-05 PROCEDURE — 3017F COLORECTAL CA SCREEN DOC REV: CPT | Performed by: NURSE PRACTITIONER

## 2023-09-05 PROCEDURE — 3074F SYST BP LT 130 MM HG: CPT | Performed by: NURSE PRACTITIONER

## 2023-09-05 PROCEDURE — 99204 OFFICE O/P NEW MOD 45 MIN: CPT | Performed by: NURSE PRACTITIONER

## 2023-09-05 PROCEDURE — 2022F DILAT RTA XM EVC RTNOPTHY: CPT | Performed by: NURSE PRACTITIONER

## 2023-09-05 PROCEDURE — G8417 CALC BMI ABV UP PARAM F/U: HCPCS | Performed by: NURSE PRACTITIONER

## 2023-09-05 PROCEDURE — G8427 DOCREV CUR MEDS BY ELIG CLIN: HCPCS | Performed by: NURSE PRACTITIONER

## 2023-09-05 RX ORDER — ROSUVASTATIN CALCIUM 40 MG/1
40 TABLET, COATED ORAL NIGHTLY
Qty: 90 TABLET | Refills: 1 | Status: SHIPPED | OUTPATIENT
Start: 2023-09-05

## 2023-09-05 RX ORDER — DOXYCYCLINE HYCLATE 100 MG/1
100 CAPSULE ORAL 2 TIMES DAILY
Qty: 20 CAPSULE | Refills: 0 | Status: SHIPPED | OUTPATIENT
Start: 2023-09-05 | End: 2023-09-15

## 2023-09-05 RX ORDER — OXYCODONE AND ACETAMINOPHEN 7.5; 325 MG/1; MG/1
1 TABLET ORAL 2 TIMES DAILY PRN
Qty: 60 TABLET | Refills: 0 | Status: SHIPPED | OUTPATIENT
Start: 2023-09-05 | End: 2023-10-05

## 2023-09-05 RX ORDER — INSULIN GLARGINE 100 [IU]/ML
INJECTION, SOLUTION SUBCUTANEOUS
Qty: 24 ADJUSTABLE DOSE PRE-FILLED PEN SYRINGE | Refills: 2 | Status: SHIPPED | OUTPATIENT
Start: 2023-09-05

## 2023-09-05 NOTE — TELEPHONE ENCOUNTER
Last Appointment:  8/2/2023  Future Appointments   Date Time Provider [address]   10/23/2023  2:15 PM Julio Mccormack DPM [facility]   12/6/2023 12:30 PM Cindy Cedeño MD [facility]   1/2/2024 11:40 AM MOIZ Juarez - CNS BDM [facility]

## 2023-09-08 LAB
CULTURE: ABNORMAL
DIRECT EXAM: ABNORMAL
SPECIMEN DESCRIPTION: ABNORMAL

## 2023-09-18 ENCOUNTER — HOSPITAL ENCOUNTER (OUTPATIENT)
Dept: WOUND CARE | Age: 53
Discharge: HOME | End: 2023-09-18
Payer: MEDICARE

## 2023-09-18 VITALS
SYSTOLIC BLOOD PRESSURE: 144 MMHG | BODY MASS INDEX: 30.55 KG/M2 | HEART RATE: 78 BPM | RESPIRATION RATE: 18 BRPM | DIASTOLIC BLOOD PRESSURE: 84 MMHG | HEIGHT: 78 IN | TEMPERATURE: 97.6 F | WEIGHT: 264 LBS

## 2023-09-18 DIAGNOSIS — S98.131A AMPUTATION OF FIFTH TOE OF RIGHT FOOT (HCC): ICD-10-CM

## 2023-09-18 DIAGNOSIS — I83.893 VARICOSE VEINS OF BILATERAL LOWER EXTREMITIES WITH OTHER COMPLICATIONS: ICD-10-CM

## 2023-09-18 DIAGNOSIS — L97.321 ANKLE ULCER, LEFT, LIMITED TO BREAKDOWN OF SKIN (HCC): ICD-10-CM

## 2023-09-18 DIAGNOSIS — I73.9 PVD (PERIPHERAL VASCULAR DISEASE) (HCC): ICD-10-CM

## 2023-09-18 DIAGNOSIS — R09.89 DECREASED DORSALIS PEDIS PULSE: ICD-10-CM

## 2023-09-18 DIAGNOSIS — S98.112A AMPUTATION OF LEFT GREAT TOE (HCC): ICD-10-CM

## 2023-09-18 DIAGNOSIS — I87.2 PERIPHERAL VENOUS INSUFFICIENCY: Primary | ICD-10-CM

## 2023-09-18 DIAGNOSIS — I70.209 FEMORAL-POPLITEAL ATHEROSCLEROSIS (HCC): ICD-10-CM

## 2023-09-18 PROBLEM — R55 SYNCOPE AND COLLAPSE: Status: RESOLVED | Noted: 2022-08-02 | Resolved: 2023-09-18

## 2023-09-18 PROBLEM — I50.21 ACUTE SYSTOLIC HEART FAILURE (HCC): Status: RESOLVED | Noted: 2020-12-11 | Resolved: 2023-09-18

## 2023-09-18 PROBLEM — G89.29 OTHER CHRONIC PAIN: Status: RESOLVED | Noted: 2023-06-09 | Resolved: 2023-09-18

## 2023-09-18 PROCEDURE — 99213 OFFICE O/P EST LOW 20 MIN: CPT

## 2023-09-18 PROCEDURE — 97597 DBRDMT OPN WND 1ST 20 CM/<: CPT

## 2023-09-18 RX ORDER — TERBINAFINE HYDROCHLORIDE 250 MG/1
250 TABLET ORAL DAILY
Qty: 20 TABLET | Refills: 0 | Status: SHIPPED | OUTPATIENT
Start: 2023-09-18 | End: 2023-10-08

## 2023-09-18 RX ORDER — LIDOCAINE HYDROCHLORIDE 40 MG/ML
SOLUTION TOPICAL ONCE
Status: DISCONTINUED | OUTPATIENT
Start: 2023-09-18 | End: 2023-09-19 | Stop reason: HOSPADM

## 2023-09-18 ASSESSMENT — PAIN SCALES - GENERAL: PAINLEVEL_OUTOF10: 0

## 2023-09-18 NOTE — PROGRESS NOTES
[address]:     [facility] [address] [place] [address]  F:[phone] [phone]     [address]:     [address] [facility] [place] [address] [facility] [address]  [phone]  WOUND CARE Dept: 115 [facility] [phone]    Patient Information:      Leah Beth  [address]   [phone]   : 1970  AGE: 48 y.o. GENDER: male   EPISODE DATE: 2023    Insurance:      PRIMARY INSURANCE:  Plan: [facility] [place] COMPLETE  Coverage: [facility] MEDICARE  Effective Date: 2023  Group Number: [unfilled]  Subscriber Number: [id number] - (Medicare Managed)    Payer/Plan Subscr  Sex Relation Sub. Ins. ID Effective Group Num   1. Ash P 1970 Male Self [id number]                                    [address] [address]       Patient Wound Information:      Problem List Items Addressed This Visit          Circulatory    Peripheral venous insufficiency - Primary    Decreased dorsalis pedis pulse               Relevant Orders    VL LOWER EXTREMITY ARTERIAL SEGMENTAL PRESSURES W PPG    PVD (peripheral vascular disease) (HCC)    Varicose veins of bilateral lower extremities with other complications       Other    Amputation of fifth toe of right foot (HCC)    Relevant Orders    VL LOWER EXTREMITY ARTERIAL SEGMENTAL PRESSURES W PPG    Amputation of left great toe (HCC)    Relevant Orders    VL LOWER EXTREMITY ARTERIAL SEGMENTAL PRESSURES W PPG    Ankle ulcer, left, limited to breakdown of skin ([address])     Other Visit Diagnoses       Femoral-popliteal atherosclerosis ([address])        Relevant Orders    VL LOWER EXTREMITY ARTERIAL SEGMENTAL PRESSURES W PPG            WOUNDS REQUIRING DRESSING SUPPLIES:     Wound 23 Ankle Medial;Left #1 (Active)   Wound Image   23 1449   Wound Etiology Venous 23 1520   Dressing Status New dressing applied;Clean;Dry; Intact 23 1520   Wound

## 2023-09-18 NOTE — PROGRESS NOTES
Wound Healing Center /Hyperbarics   History and Physical/Consultation  Vascular    Referring Physician : Alexandr Angel MD  [facility] RECORD NUMBER:  [id number]  AGE: 48 y.o. GENDER: male  : 1970  EPISODE DATE:  2023  Subjective:     Chief Complaint   Patient presents with    Wound Check     Left medial ankle wound         HISTORY of PRESENT ILLNESS HPI     Sean Flores is a 48 y.o. male who presents today for wound/ulcer evaluation. History of Wound Context:  The patient has had a wound of left ankle which was first noted approximately a few weeks ago. This has been treated by his PCP with antibiotics that helped him. On their initial visit to the wound healing center, 23, the patient has noted that the wound has been improving.   The patient has had similar previous wounds in the past.      Patient tells me he always gets small skin ulcers that he will eventually, and tells me whenever he was gardening, he scraped the skin resulting in small ulcers that take long time to heal    He does have history of diabetes associated with neuropathy is undergone amputation of toes bilaterally in the past and concerned about her circulation    Pt is currently not on abx.,  Completed his course of antibiotics    Patient denies any chest pain, shortness of breath or palpitation    No history of fever or chills      Wound/Ulcer Pain Timing/Severity: constant  Quality of pain: dull, aching, burning  Severity:  2 / 10   Modifying Factors: None  Associated Signs/Symptoms: pain and tingling    Ulcer Identification:  Ulcer Type: diabetic  Contributing Factors: diabetes    Diabetic/Pressure/Non Pressure Ulcers only:  Ulcer: Diabetic ulcer, limited to breakdown of skin    If patient has diabetic lower extremity wounds  Panchal Classification of diabetic lower extremity wounds:    Grade Description   []  0 No open wound   []  1 Superficial ulcer involving the full skin thickness   []  2 Deep

## 2023-09-18 NOTE — DISCHARGE INSTRUCTIONS
Visit Discharge/Physician Orders    Discharge condition: Stable    Assessment of pain at discharge: yes    Anesthetic used: 4% lidocaine    Discharge to: Home    Left via:Private automobile    Accompanied by: accompanied by self    ECF/HHA: [facility]     Dressing Orders:Cleanse wound to left  with normal saline, apply GLO to wound bed and cover with ABD pad and gauze, change daily. Spandgrip on Left- On in morning and off at night       Treatment Orders: Miconazole Ointment prescription sent to pharmacy- apply to feet, toes, and legs- avoiding wounds twice a day. Lamisil tablets prescription sent to pharmacy- take by mouth as prescribed. Vasculars to be scheduled  EAT DIET WITH PROTEINS AND VITAMIN C  TAKE A MULTIVITAMIN DAILY IF NOT CONTRAINDICATED       [facility] followup visit __________2 week___________________  (Please note your next appointment above and if you are unable to keep, kindly give a 24 hour notice. Thank you.)    Physician signature:__________________________      If you experience any of the following, please call the [facility] during business hours:    * Increase in Pain  * Temperature over 101  * Increase in drainage from your wound  * Drainage with a foul odor  * Bleeding  * Increase in swelling  * Need for compression bandage changes due to slippage, breakthrough drainage. If you need medical attention outside of the business hours of the [facility] please contact your PCP or go to the nearest emergency room.

## 2023-10-02 ENCOUNTER — HOSPITAL ENCOUNTER (OUTPATIENT)
Dept: WOUND CARE | Age: 53
Discharge: HOME | End: 2023-10-02

## 2023-10-02 NOTE — DISCHARGE INSTRUCTIONS
Visit Discharge/Physician Orders     Discharge condition: Stable     Assessment of pain at discharge: yes     Anesthetic used: 4% lidocaine     Discharge to: Home     Left via:Private automobile     Accompanied by: accompanied by self     ECF/HHA: [facility]      Dressing Orders:Cleanse wound to left  with normal saline, apply GLO to wound bed and cover with ABD pad and gauze, change daily. Spandgrip on Left- On in morning and off at night        Treatment Orders: Miconazole Ointment prescription sent to pharmacy- apply to feet, toes, and legs- avoiding wounds twice a day. Lamisil tablets prescription sent to pharmacy- take by mouth as prescribed. Vasculars to be scheduled  EAT DIET WITH PROTEINS AND VITAMIN C  TAKE A MULTIVITAMIN DAILY IF NOT CONTRAINDICATED        [facility] followup visit __________2 week___________________  (Please note your next appointment above and if you are unable to keep, kindly give a 24 hour notice. Thank you.)     Physician signature:__________________________        If you experience any of the following, please call the [facility] during business hours:     * Increase in Pain  * Temperature over 101  * Increase in drainage from your wound  * Drainage with a foul odor  * Bleeding  * Increase in swelling  * Need for compression bandage changes due to slippage, breakthrough drainage. If you need medical attention outside of the business hours of the [facility] please contact your PCP or go to the nearest emergency room.

## 2023-10-03 NOTE — DISCHARGE INSTRUCTIONS
Visit Discharge/Physician Orders     Discharge condition: Stable     Assessment of pain at discharge: yes     Anesthetic used: 4% lidocaine     Discharge to: Home     Left via:Private automobile     Accompanied by: accompanied by self     ECF/HHA: [facility]      Dressing Orders:Cleanse wound to left  with normal saline, apply GLO to wound bed and cover with ABD pad and gauze, change daily. Spandgrip on Left- On in morning and off at night        Treatment Orders: Miconazole Ointment prescription sent to pharmacy- apply to feet, toes, and legs- avoiding wounds twice a day. Lamisil tablets prescription sent to pharmacy- take by mouth as prescribed. Vasculars to be scheduled  EAT DIET WITH PROTEINS AND VITAMIN C  TAKE A MULTIVITAMIN DAILY IF NOT CONTRAINDICATED        [facility] followup visit __________2 week___________________  (Please note your next appointment above and if you are unable to keep, kindly give a 24 hour notice. Thank you.)     Physician signature:__________________________        If you experience any of the following, please call the [facility] during business hours:     * Increase in Pain  * Temperature over 101  * Increase in drainage from your wound  * Drainage with a foul odor  * Bleeding  * Increase in swelling  * Need for compression bandage changes due to slippage, breakthrough drainage. If you need medical attention outside of the business hours of the [facility] please contact your PCP or go to the nearest emergency room.

## 2023-10-09 ENCOUNTER — HOSPITAL ENCOUNTER (OUTPATIENT)
Dept: WOUND CARE | Age: 53
Discharge: HOME | End: 2023-10-09

## 2023-10-09 DIAGNOSIS — M48.02 CERVICAL STENOSIS OF SPINE: ICD-10-CM

## 2023-10-09 DIAGNOSIS — Z79.4 TYPE 2 DIABETES MELLITUS WITH DIABETIC POLYNEUROPATHY, WITH LONG-TERM CURRENT USE OF INSULIN (HCC): ICD-10-CM

## 2023-10-09 DIAGNOSIS — Z95.1 HISTORY OF FOUR VESSEL CORONARY ARTERY BYPASS GRAFT: ICD-10-CM

## 2023-10-09 DIAGNOSIS — E11.42 TYPE 2 DIABETES MELLITUS WITH DIABETIC POLYNEUROPATHY, WITH LONG-TERM CURRENT USE OF INSULIN (HCC): ICD-10-CM

## 2023-10-09 NOTE — TELEPHONE ENCOUNTER
Last Appointment:  8/2/2023  Future Appointments   Date Time Provider [address]   10/16/2023  2:00 PM Smooth Garcia MD [address]   10/23/2023  2:15 PM Reyes Maldonado DPM [facility]   12/6/2023 12:30 PM Shannon Perkins MD [facility]   1/2/2024 11:40 AM [facility]       Patient stated he also needs a prescription for a new Dexcom Transmitter.

## 2023-10-10 RX ORDER — PROCHLORPERAZINE 25 MG/1
SUPPOSITORY RECTAL
Qty: 3 EACH | Refills: 11 | Status: SHIPPED | OUTPATIENT
Start: 2023-10-10

## 2023-10-10 RX ORDER — OXYCODONE AND ACETAMINOPHEN 7.5; 325 MG/1; MG/1
1 TABLET ORAL 2 TIMES DAILY PRN
Qty: 60 TABLET | Refills: 0 | Status: SHIPPED | OUTPATIENT
Start: 2023-10-10 | End: 2023-11-09

## 2023-10-10 RX ORDER — ROSUVASTATIN CALCIUM 40 MG/1
40 TABLET, COATED ORAL NIGHTLY
Qty: 90 TABLET | Refills: 1 | Status: SHIPPED | OUTPATIENT
Start: 2023-10-10

## 2023-10-16 ENCOUNTER — HOSPITAL ENCOUNTER (OUTPATIENT)
Dept: WOUND CARE | Age: 53
Discharge: HOME | End: 2023-10-16
Attending: SURGERY
Payer: MEDICARE

## 2023-10-16 DIAGNOSIS — L97.321 ANKLE ULCER, LEFT, LIMITED TO BREAKDOWN OF SKIN (HCC): Primary | ICD-10-CM

## 2023-10-16 DIAGNOSIS — I83.029 VENOUS ULCER OF LEFT LEG (HCC): ICD-10-CM

## 2023-10-16 DIAGNOSIS — L97.929 VENOUS ULCER OF LEFT LEG (HCC): ICD-10-CM

## 2023-10-16 DIAGNOSIS — R09.89 DECREASED DORSALIS PEDIS PULSE: ICD-10-CM

## 2023-10-16 DIAGNOSIS — I83.893 VARICOSE VEINS OF BILATERAL LOWER EXTREMITIES WITH OTHER COMPLICATIONS: ICD-10-CM

## 2023-10-16 PROCEDURE — 99213 OFFICE O/P EST LOW 20 MIN: CPT

## 2023-10-16 PROCEDURE — 99212 OFFICE O/P EST SF 10 MIN: CPT | Performed by: SURGERY

## 2023-10-16 RX ORDER — LIDOCAINE HYDROCHLORIDE 40 MG/ML
SOLUTION TOPICAL ONCE
Status: COMPLETED | OUTPATIENT
Start: 2023-10-16 | End: 2023-10-16

## 2023-10-16 RX ADMIN — LIDOCAINE HYDROCHLORIDE 5 ML: 40 SOLUTION TOPICAL at 14:29

## 2023-10-16 NOTE — PLAN OF CARE
Problem: Chronic Conditions and Co-morbidities  Goal: Patient's chronic conditions and co-morbidity symptoms are monitored and maintained or improved  Outcome: Progressing     Problem: Chronic Conditions and Co-morbidities  Goal: Patient's chronic conditions and co-morbidity symptoms are monitored and maintained or improved  Outcome: Progressing     Problem: Pain  Goal: Verbalizes/displays adequate comfort level or baseline comfort level  Outcome: Progressing     Problem: Cognitive:  Goal: Knowledge of wound care  Description: Knowledge of wound care  Outcome: Progressing  Goal: Understands risk factors for wounds  Description: Understands risk factors for wounds  Outcome: Progressing     Problem: Wound:  Goal: Will show signs of wound healing; wound closure and no evidence of infection  Description: Will show signs of wound healing; wound closure and no evidence of infection  Outcome: Progressing Rapid response was called on this patient for hematemesis. Patient is under the care of oncology. She has metastatic cancer/peritoneal carcinomatosis. Extensive goals of care discussion by primary oncology with the patient after gastric mass was noted on EGD. Family not receptive to hospice at that time. Patient was hypotensive after hematemesis. She vomited about 500 mL amie red blood. Ordered 500 mL bolus LR. Discussed again with patient regarding goals of care. Strongly encouraged patient to consider hospice. Patient agreeable. Case management consulted and hospice referral made.

## 2023-10-16 NOTE — DISCHARGE INSTRUCTIONS
Visit Discharge/Physician Orders     Discharge condition: Stable     Assessment of pain at discharge: yes     Anesthetic used: 4% lidocaine     Discharge to: Home     Left via:Private automobile     Accompanied by: accompanied by self     ECF/HHA: [facility] p:[phone]      Dressing Orders:Cleanse wound to left  with normal saline, apply GLO to wound bed and cover with ABD pad and gauze, change daily. Spandgrip on Left- On in morning and off at night        Treatment Orders: Miconazole Ointment prescription sent to pharmacy- apply to feet, toes, and legs- avoiding wounds twice a day. Lamisil tablets prescription sent to pharmacy- take by mouth as prescribed. 10/16 Vasculars to be scheduled  EAT DIET WITH PROTEINS AND VITAMIN C  TAKE A MULTIVITAMIN DAILY IF NOT CONTRAINDICATED        [facility] followup visit __________2 week___________________  (Please note your next appointment above and if you are unable to keep, kindly give a 24 hour notice. Thank you.)     Physician signature:__________________________        If you experience any of the following, please call the [facility] during business hours:     * Increase in Pain  * Temperature over 101  * Increase in drainage from your wound  * Drainage with a foul odor  * Bleeding  * Increase in swelling  * Need for compression bandage changes due to slippage, breakthrough drainage. If you need medical attention outside of the business hours of the [facility] please contact your PCP or go to the nearest emergency room.

## 2023-10-16 NOTE — PROGRESS NOTES
following, please call the [address] [place] [address] during business hours:     * Increase in Pain  * Temperature over 101  * Increase in drainage from your wound  * Drainage with a foul odor  * Bleeding  * Increase in swelling  * Need for compression bandage changes due to slippage, breakthrough drainage. If you need medical attention outside of the business hours of the [address] [place] [address] please contact your PCP or go to the nearest emergency room.          Electronically signed by Jeanine Martin MD on 10/16/2023 at 3:08 PM

## 2023-10-23 ENCOUNTER — OFFICE VISIT (OUTPATIENT)
Dept: PODIATRY | Age: 53
End: 2023-10-23
Payer: MEDICARE

## 2023-10-23 DIAGNOSIS — M25.572 BILATERAL ANKLE PAIN, UNSPECIFIED CHRONICITY: ICD-10-CM

## 2023-10-23 DIAGNOSIS — E11.9 TYPE 2 DIABETES MELLITUS WITHOUT COMPLICATION, UNSPECIFIED WHETHER LONG TERM INSULIN USE (HCC): Primary | ICD-10-CM

## 2023-10-23 DIAGNOSIS — M25.571 BILATERAL ANKLE PAIN, UNSPECIFIED CHRONICITY: ICD-10-CM

## 2023-10-23 DIAGNOSIS — Z89.429 HISTORY OF AMPUTATION OF TOE (HCC): ICD-10-CM

## 2023-10-23 DIAGNOSIS — M79.671 PAIN IN BOTH FEET: ICD-10-CM

## 2023-10-23 DIAGNOSIS — M79.672 PAIN IN BOTH FEET: ICD-10-CM

## 2023-10-23 DIAGNOSIS — G60.8 HEREDITARY SENSORY NEUROPATHY: ICD-10-CM

## 2023-10-23 PROCEDURE — 3017F COLORECTAL CA SCREEN DOC REV: CPT | Performed by: PODIATRIST

## 2023-10-23 PROCEDURE — G8417 CALC BMI ABV UP PARAM F/U: HCPCS | Performed by: PODIATRIST

## 2023-10-23 PROCEDURE — G8427 DOCREV CUR MEDS BY ELIG CLIN: HCPCS | Performed by: PODIATRIST

## 2023-10-23 PROCEDURE — G8484 FLU IMMUNIZE NO ADMIN: HCPCS | Performed by: PODIATRIST

## 2023-10-23 PROCEDURE — 3052F HG A1C>EQUAL 8.0%<EQUAL 9.0%: CPT | Performed by: PODIATRIST

## 2023-10-23 PROCEDURE — 4004F PT TOBACCO SCREEN RCVD TLK: CPT | Performed by: PODIATRIST

## 2023-10-23 PROCEDURE — 99203 OFFICE O/P NEW LOW 30 MIN: CPT | Performed by: PODIATRIST

## 2023-10-23 PROCEDURE — 2022F DILAT RTA XM EVC RTNOPTHY: CPT | Performed by: PODIATRIST

## 2023-10-23 NOTE — PROGRESS NOTES
New patient in office with c/o charcot foot. Diabetic. Hx of amputation of right 4th toe and left great toe. Currently wearing slippers. 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 Dejan Patino : 1970 Sex: male  Age: 48 y.o. Patient was referred by Shannon Perkins MD    CC:   Diabetic foot exam       HPI:   This 80-year-old male patient referred me today for diabetic foot ankle exam.  History of multiple amputations both left great toe and right fifth toe. Does have history of follow-up with vascular surgery team for previous wound of his ankle. No open wounds today. Does have a history of gabapentin. No current use of diabetic inserts or diabetic shoes. No calf pain. ROS:  Const: Denies constitutional symptoms  Musculo: Denies symptoms other than stated above  Skin: Denies symptoms other than stated above      Current Outpatient Medications:     Continuous Blood Gluc Sensor (DEXCOM G6 SENSOR) MISC, Change every 10 days, Disp: 3 each, Rfl: 11    oxyCODONE-acetaminophen (PERCOCET) 7.5-325 MG per tablet, Take 1 tablet by mouth 2 times daily as needed for Pain for up to 30 days. , Disp: 60 tablet, Rfl: 0    rosuvastatin (CRESTOR) 40 MG tablet, Take 1 tablet by mouth nightly, Disp: 90 tablet, Rfl: 1    miconazole nitrate 2 % OINT, Apply topically 2 times daily Please apply to the toes, in between the toes, both feet and legs up to the upper calf twice a day for 1 month, Disp: 1 each, Rfl: 10    insulin glargine (LANTUS SOLOSTAR) 100 UNIT/ML injection pen, Inject 65 units at night, Disp: 24 Adjustable Dose Pre-filled Pen Syringe, Rfl: 2    sertraline (ZOLOFT) 50 MG tablet, TAKE ONE TABLET BY MOUTH DAILY, Disp: 90 tablet, Rfl: 1    busPIRone (BUSPAR) 15 MG tablet, TAKE 1 TABLET BY MOUTH TWICE DAILY, Disp: 180 tablet, Rfl: 1    Semaglutide,0.25 or 0.5MG/DOS, (OZEMPIC, 0.25 OR 0.5 MG/DOSE,) 2 MG/3ML SOPN, Inject 0.5 mg into the skin once a week (Patient not taking: Reported on 2023),

## 2023-11-07 DIAGNOSIS — M48.02 CERVICAL STENOSIS OF SPINE: ICD-10-CM

## 2023-11-07 NOTE — TELEPHONE ENCOUNTER
oxyCODONE-acetaminophen (PERCOCET) 7.5-325 MG per tablet     Last Appointment:  8/2/2023  Future Appointments   Date Time Provider [address]   11/13/2023  9:00 AM Patty Alvarenga MD [address]   11/16/2023  2:30  [address] Rad/Car   12/6/2023 12:30 PM Obie Escoto MD [facility]   1/2/2024 11:40 AM MOIZ Cabrera - CNS BDM ENDO [facility]   1/22/2024  2:30 PM Sherice Chavez DPM [facility]

## 2023-11-09 RX ORDER — OXYCODONE AND ACETAMINOPHEN 7.5; 325 MG/1; MG/1
1 TABLET ORAL 2 TIMES DAILY PRN
Qty: 60 TABLET | Refills: 0 | Status: SHIPPED | OUTPATIENT
Start: 2023-11-09 | End: 2023-12-09

## 2023-12-03 ENCOUNTER — APPOINTMENT (OUTPATIENT)
Dept: ULTRASOUND IMAGING | Age: 53
End: 2023-12-03
Payer: MEDICARE

## 2023-12-03 ENCOUNTER — APPOINTMENT (OUTPATIENT)
Dept: GENERAL RADIOLOGY | Age: 53
End: 2023-12-03
Payer: MEDICARE

## 2023-12-03 ENCOUNTER — HOSPITAL ENCOUNTER (EMERGENCY)
Age: 53
Discharge: HOME | End: 2023-12-04
Attending: EMERGENCY MEDICINE
Payer: MEDICARE

## 2023-12-03 VITALS
OXYGEN SATURATION: 100 % | TEMPERATURE: 97.3 F | HEART RATE: 82 BPM | RESPIRATION RATE: 18 BRPM | BODY MASS INDEX: 30.08 KG/M2 | SYSTOLIC BLOOD PRESSURE: 122 MMHG | HEIGHT: 78 IN | DIASTOLIC BLOOD PRESSURE: 83 MMHG | WEIGHT: 260 LBS

## 2023-12-03 DIAGNOSIS — L03.115 CELLULITIS OF RIGHT LEG: Primary | ICD-10-CM

## 2023-12-03 LAB
ALBUMIN SERPL-MCNC: 4.2 G/DL (ref 3.5–5.2)
ALP SERPL-CCNC: 74 U/L (ref 40–129)
ALT SERPL-CCNC: 27 U/L (ref 0–40)
ANION GAP SERPL CALCULATED.3IONS-SCNC: 14 MMOL/L (ref 7–16)
AST SERPL-CCNC: 21 U/L (ref 0–39)
BASOPHILS # BLD: 0.02 K/UL (ref 0–0.2)
BASOPHILS NFR BLD: 0 % (ref 0–2)
BILIRUB SERPL-MCNC: 0.5 MG/DL (ref 0–1.2)
BUN SERPL-MCNC: 22 MG/DL (ref 6–20)
CALCIUM SERPL-MCNC: 9.1 MG/DL (ref 8.6–10.2)
CHLORIDE SERPL-SCNC: 98 MMOL/L (ref 98–107)
CO2 SERPL-SCNC: 22 MMOL/L (ref 22–29)
CREAT SERPL-MCNC: 0.9 MG/DL (ref 0.7–1.2)
CRP SERPL HS-MCNC: 76 MG/L (ref 0–5)
EOSINOPHIL # BLD: 0.03 K/UL (ref 0.05–0.5)
EOSINOPHILS RELATIVE PERCENT: 1 % (ref 0–6)
ERYTHROCYTE [DISTWIDTH] IN BLOOD BY AUTOMATED COUNT: 13.4 % (ref 11.5–15)
ERYTHROCYTE [SEDIMENTATION RATE] IN BLOOD BY WESTERGREN METHOD: 33 MM/HR (ref 0–15)
GFR SERPL CREATININE-BSD FRML MDRD: >60 ML/MIN/1.73M2
GLUCOSE SERPL-MCNC: 318 MG/DL (ref 74–99)
HCT VFR BLD AUTO: 44.6 % (ref 37–54)
HGB BLD-MCNC: 14.8 G/DL (ref 12.5–16.5)
IMM GRANULOCYTES # BLD AUTO: <0.03 K/UL (ref 0–0.58)
IMM GRANULOCYTES NFR BLD: 0 % (ref 0–5)
LACTATE BLDV-SCNC: 1.5 MMOL/L (ref 0.5–1.9)
LYMPHOCYTES NFR BLD: 1.13 K/UL (ref 1.5–4)
LYMPHOCYTES RELATIVE PERCENT: 25 % (ref 20–42)
MCH RBC QN AUTO: 28.9 PG (ref 26–35)
MCHC RBC AUTO-ENTMCNC: 33.2 G/DL (ref 32–34.5)
MCV RBC AUTO: 87.1 FL (ref 80–99.9)
MONOCYTES NFR BLD: 0.49 K/UL (ref 0.1–0.95)
MONOCYTES NFR BLD: 11 % (ref 2–12)
NEUTROPHILS NFR BLD: 62 % (ref 43–80)
NEUTS SEG NFR BLD: 2.78 K/UL (ref 1.8–7.3)
PLATELET # BLD AUTO: 176 K/UL (ref 130–450)
PMV BLD AUTO: 10.7 FL (ref 7–12)
POTASSIUM SERPL-SCNC: 3.6 MMOL/L (ref 3.5–5)
PROT SERPL-MCNC: 7.5 G/DL (ref 6.4–8.3)
RBC # BLD AUTO: 5.12 M/UL (ref 3.8–5.8)
SODIUM SERPL-SCNC: 134 MMOL/L (ref 132–146)
WBC OTHER # BLD: 4.5 K/UL (ref 4.5–11.5)

## 2023-12-03 PROCEDURE — 93970 EXTREMITY STUDY: CPT

## 2023-12-03 PROCEDURE — 86140 C-REACTIVE PROTEIN: CPT

## 2023-12-03 PROCEDURE — 85652 RBC SED RATE AUTOMATED: CPT

## 2023-12-03 PROCEDURE — 73590 X-RAY EXAM OF LOWER LEG: CPT

## 2023-12-03 PROCEDURE — 6370000000 HC RX 637 (ALT 250 FOR IP)

## 2023-12-03 PROCEDURE — 99284 EMERGENCY DEPT VISIT MOD MDM: CPT

## 2023-12-03 PROCEDURE — 85025 COMPLETE CBC W/AUTO DIFF WBC: CPT

## 2023-12-03 PROCEDURE — 73600 X-RAY EXAM OF ANKLE: CPT

## 2023-12-03 PROCEDURE — 83605 ASSAY OF LACTIC ACID: CPT

## 2023-12-03 PROCEDURE — 80053 COMPREHEN METABOLIC PANEL: CPT

## 2023-12-03 RX ORDER — DOXYCYCLINE HYCLATE 100 MG/1
100 CAPSULE ORAL ONCE
Status: COMPLETED | OUTPATIENT
Start: 2023-12-03 | End: 2023-12-03

## 2023-12-03 RX ORDER — CEPHALEXIN 500 MG/1
500 CAPSULE ORAL 4 TIMES DAILY
Qty: 20 CAPSULE | Refills: 0 | Status: SHIPPED | OUTPATIENT
Start: 2023-12-03 | End: 2023-12-13

## 2023-12-03 RX ORDER — DOXYCYCLINE HYCLATE 100 MG
100 TABLET ORAL 2 TIMES DAILY
Qty: 14 TABLET | Refills: 0 | Status: SHIPPED | OUTPATIENT
Start: 2023-12-03 | End: 2023-12-13

## 2023-12-03 RX ORDER — CEPHALEXIN 500 MG/1
500 CAPSULE ORAL ONCE
Status: COMPLETED | OUTPATIENT
Start: 2023-12-03 | End: 2023-12-03

## 2023-12-03 RX ADMIN — DOXYCYCLINE HYCLATE 100 MG: 100 CAPSULE ORAL at 23:53

## 2023-12-03 RX ADMIN — CEPHALEXIN 500 MG: 500 CAPSULE ORAL at 23:53

## 2023-12-03 ASSESSMENT — PAIN - FUNCTIONAL ASSESSMENT: PAIN_FUNCTIONAL_ASSESSMENT: NONE - DENIES PAIN

## 2023-12-04 DIAGNOSIS — Z79.4 TYPE 2 DIABETES MELLITUS WITH DIABETIC PERIPHERAL ANGIOPATHY WITHOUT GANGRENE, WITH LONG-TERM CURRENT USE OF INSULIN (HCC): ICD-10-CM

## 2023-12-04 DIAGNOSIS — Z79.4 TYPE 2 DIABETES MELLITUS WITH DIABETIC POLYNEUROPATHY, WITH LONG-TERM CURRENT USE OF INSULIN (HCC): ICD-10-CM

## 2023-12-04 DIAGNOSIS — E11.51 TYPE 2 DIABETES MELLITUS WITH DIABETIC PERIPHERAL ANGIOPATHY WITHOUT GANGRENE, WITH LONG-TERM CURRENT USE OF INSULIN (HCC): ICD-10-CM

## 2023-12-04 DIAGNOSIS — E11.42 TYPE 2 DIABETES MELLITUS WITH DIABETIC POLYNEUROPATHY, WITH LONG-TERM CURRENT USE OF INSULIN (HCC): ICD-10-CM

## 2023-12-04 ASSESSMENT — PAIN - FUNCTIONAL ASSESSMENT: PAIN_FUNCTIONAL_ASSESSMENT: NONE - DENIES PAIN

## 2023-12-04 NOTE — DISCHARGE INSTRUCTIONS
Return to the emergency department if you develop worsening pain or spreading of redness of the area. If you develop fevers or chills, please return. XR ANKLE RIGHT (2 VIEWS)   Final Result   Mild medial soft tissue swelling. Plantar and posterior calcaneal spurs. Degenerative changes in the midfoot as described above, without Lisfranc or   Chopart fractures. XR TIBIA FIBULA RIGHT (2 VIEWS)   Final Result   No sign of acute osseous injury to the tibia or fibula. No destructive lesions to suggest osteomyelitis. Vascular duplex lower extremity venous bilateral   Final Result   No evidence of DVT in either lower extremity.

## 2023-12-04 NOTE — ED PROVIDER NOTES
[address] [facility] [address] ENCOUNTER        Pt Name: Marcos Hernandez  MRN: [id number]  [address] [facility] [place] 1970  Date of evaluation: 12/3/2023  Provider: Apolonio Hatchet, DO  PCP: Samy Schaefer MD  Note Started: 8:51 PM EST 12/3/23    CHIEF COMPLAINT       Chief Complaint   Patient presents with    Fatigue     Started Thursday on and off. Wound Infection     Patient states he is a diabetic and has a diabetic ulcer on left inner ankle. Patient is concerned for a blood infection/Cellulitis. Patient does have discoloration to bilateral lower extremities. HISTORY OF PRESENT ILLNESS: 1 or more Elements   History From: Patient      Marcos Hernandez is a 48 y.o. male who presents to the emergency department for evaluation of fatigue and possible wound infection. Patient has charcot arthropathy of the right foot. Patient states that today he noticed that his right leg is red. Patient is not on any blood thinners. Patient has not had any trauma to the area. Patient states that he was sweating 2 days ago. Patient denies any fevers. Patient states that he has not had any sick contacts. Patient is post follow-up vascular surgery however he has been unable to. Patient has not been on any antibiotics            Nursing Notes were all reviewed and agreed with or any disagreements were addressed in the HPI. REVIEW OF SYSTEMS :      Positives and Pertinent negatives as per HPI.      SURGICAL HISTORY     Past Surgical History:   Procedure Laterality Date    CARDIAC SURGERY      CORONARY ARTERY BYPASS GRAFT  12/2020    EYE LID SURGERY      FOOT SURGERY Bilateral     FRACTURE SURGERY      OTHER SURGICAL HISTORY      atrial clip-has card in [address] [place], [place] [address]Object       Discharge Medication List as of 12/3/2023 11:57 PM        CONTINUE these medications which have NOT CHANGED    Details   oxyCODONE-acetaminophen (PERCOCET)

## 2023-12-05 RX ORDER — GABAPENTIN 300 MG/1
300 CAPSULE ORAL 3 TIMES DAILY
Qty: 270 CAPSULE | Refills: 0 | Status: SHIPPED | OUTPATIENT
Start: 2023-12-05 | End: 2024-03-04

## 2023-12-06 ENCOUNTER — OFFICE VISIT (OUTPATIENT)
Dept: PRIMARY CARE CLINIC | Age: 53
End: 2023-12-06
Payer: MEDICARE

## 2023-12-06 VITALS
WEIGHT: 260 LBS | RESPIRATION RATE: 16 BRPM | HEIGHT: 78 IN | HEART RATE: 74 BPM | BODY MASS INDEX: 30.08 KG/M2 | SYSTOLIC BLOOD PRESSURE: 118 MMHG | DIASTOLIC BLOOD PRESSURE: 77 MMHG | TEMPERATURE: 33.8 F | OXYGEN SATURATION: 100 %

## 2023-12-06 DIAGNOSIS — M48.02 CERVICAL STENOSIS OF SPINE: ICD-10-CM

## 2023-12-06 DIAGNOSIS — Z00.00 WELCOME TO MEDICARE PREVENTIVE VISIT: Primary | ICD-10-CM

## 2023-12-06 DIAGNOSIS — Z71.89 ACP (ADVANCE CARE PLANNING): ICD-10-CM

## 2023-12-06 DIAGNOSIS — E11.42 TYPE 2 DIABETES MELLITUS WITH DIABETIC POLYNEUROPATHY, WITH LONG-TERM CURRENT USE OF INSULIN (HCC): ICD-10-CM

## 2023-12-06 DIAGNOSIS — Z12.11 SCREENING FOR COLON CANCER: ICD-10-CM

## 2023-12-06 DIAGNOSIS — Z79.4 TYPE 2 DIABETES MELLITUS WITH DIABETIC POLYNEUROPATHY, WITH LONG-TERM CURRENT USE OF INSULIN (HCC): ICD-10-CM

## 2023-12-06 LAB — HBA1C MFR BLD: 9.1 %

## 2023-12-06 PROCEDURE — G0402 INITIAL PREVENTIVE EXAM: HCPCS | Performed by: FAMILY MEDICINE

## 2023-12-06 PROCEDURE — 3074F SYST BP LT 130 MM HG: CPT | Performed by: FAMILY MEDICINE

## 2023-12-06 PROCEDURE — 3046F HEMOGLOBIN A1C LEVEL >9.0%: CPT | Performed by: FAMILY MEDICINE

## 2023-12-06 PROCEDURE — 99497 ADVNCD CARE PLAN 30 MIN: CPT | Performed by: FAMILY MEDICINE

## 2023-12-06 PROCEDURE — 83036 HEMOGLOBIN GLYCOSYLATED A1C: CPT | Performed by: FAMILY MEDICINE

## 2023-12-06 PROCEDURE — 3078F DIAST BP <80 MM HG: CPT | Performed by: FAMILY MEDICINE

## 2023-12-06 PROCEDURE — 3017F COLORECTAL CA SCREEN DOC REV: CPT | Performed by: FAMILY MEDICINE

## 2023-12-06 RX ORDER — OXYCODONE AND ACETAMINOPHEN 7.5; 325 MG/1; MG/1
1 TABLET ORAL 2 TIMES DAILY PRN
Qty: 60 TABLET | Refills: 0 | Status: SHIPPED | OUTPATIENT
Start: 2023-12-06 | End: 2024-01-05

## 2023-12-06 NOTE — PROGRESS NOTES
Medicare Annual Wellness Visit    [facility] is here for Medicare AWV (Patient here for his wellness annual exam.)    Assessment & Plan   Welcome to Medicare preventive visit  Type 2 diabetes mellitus with diabetic polyneuropathy, with long-term current use of insulin (HCC)  -     POCT glycosylated hemoglobin (Hb A1C)  ACP (advance care planning)  -     VA Advanced Care Planning (16-30 minutes) [48578]  Cervical stenosis of spine  -     oxyCODONE-acetaminophen (PERCOCET) 7.5-325 MG per tablet; Take 1 tablet by mouth 2 times daily as needed for Pain for up to 30 days. , Disp-60 tablet, R-0Normal  Screening for colon cancer  -     Cologuard (Fecal DNA Colorectal Cancer Screening)    Recommendations for Preventive Services Due: see orders and patient instructions/AVS.  Recommended screening schedule for the next 5-10 years is provided to the patient in written form: see Patient Instructions/AVS.     Return for Medicare Annual Wellness Visit in 1 year. Subjective   The following acute and/or chronic problems were also addressed today:  See above    Patient's complete Health Risk Assessment and screening values have been reviewed and are found in Flowsheets. The following problems were reviewed today and where indicated follow up appointments were made and/or referrals ordered. Positive Risk Factor Screenings with Interventions:            Controlled Medication Review: Today's Pain Level: No data recorded   Opioid Risk: (Low risk score <55) Opioid risk score: 38    Patient is low risk for opioid use disorder or overdose.     Last PDMP Carmelita Armenta as Reviewed:  Review User Review Instant Review Result   Edwina Germain 11/9/2023  8:49 AM     Reviewed PDMP [1]     Last Controlled Substance Monitoring Documentation      Two [facility] Office Visit from 6/2/2023 in [facility]   Periodic Controlled Substance Monitoring Possible medication side effects, risk of tolerance/dependence & alternative treatments

## 2023-12-06 NOTE — PATIENT INSTRUCTIONS

## 2024-01-04 ENCOUNTER — TELEPHONE (OUTPATIENT)
Dept: ENDOCRINOLOGY | Age: 54
End: 2024-01-04

## 2024-01-04 ENCOUNTER — OFFICE VISIT (OUTPATIENT)
Dept: ENDOCRINOLOGY | Age: 54
End: 2024-01-04

## 2024-01-04 VITALS
OXYGEN SATURATION: 100 % | HEIGHT: 78 IN | BODY MASS INDEX: 31.12 KG/M2 | DIASTOLIC BLOOD PRESSURE: 88 MMHG | SYSTOLIC BLOOD PRESSURE: 132 MMHG | RESPIRATION RATE: 16 BRPM | HEART RATE: 83 BPM | WEIGHT: 269 LBS

## 2024-01-04 DIAGNOSIS — E78.5 HYPERLIPIDEMIA, UNSPECIFIED HYPERLIPIDEMIA TYPE: ICD-10-CM

## 2024-01-04 DIAGNOSIS — Z79.4 TYPE 2 DIABETES MELLITUS WITH DIABETIC POLYNEUROPATHY, WITH LONG-TERM CURRENT USE OF INSULIN (HCC): ICD-10-CM

## 2024-01-04 DIAGNOSIS — Z91.119 DIETARY NONCOMPLIANCE: ICD-10-CM

## 2024-01-04 DIAGNOSIS — E11.65 TYPE 2 DIABETES MELLITUS WITH HYPERGLYCEMIA, WITH LONG-TERM CURRENT USE OF INSULIN (HCC): Primary | ICD-10-CM

## 2024-01-04 DIAGNOSIS — Z79.4 TYPE 2 DIABETES MELLITUS WITH HYPERGLYCEMIA, WITH LONG-TERM CURRENT USE OF INSULIN (HCC): Primary | ICD-10-CM

## 2024-01-04 DIAGNOSIS — E11.42 TYPE 2 DIABETES MELLITUS WITH DIABETIC POLYNEUROPATHY, WITH LONG-TERM CURRENT USE OF INSULIN (HCC): ICD-10-CM

## 2024-01-04 RX ORDER — INSULIN PMP CART,AUT,G6/7,CNTR
EACH SUBCUTANEOUS
Qty: 10 EACH | Refills: 11 | Status: SHIPPED | OUTPATIENT
Start: 2024-01-04

## 2024-01-04 RX ORDER — INSULIN PMP CART,AUT,G6/7,CNTR
1 EACH SUBCUTANEOUS ONCE
Qty: 1 KIT | Refills: 0 | Status: SHIPPED | OUTPATIENT
Start: 2024-01-04 | End: 2024-01-04

## 2024-01-04 RX ORDER — INSULIN LISPRO 100 [IU]/ML
INJECTION, SOLUTION INTRAVENOUS; SUBCUTANEOUS
Qty: 60 ML | Refills: 5 | Status: SHIPPED
Start: 2024-01-04 | End: 2024-01-04

## 2024-01-04 RX ORDER — PROCHLORPERAZINE 25 MG/1
SUPPOSITORY RECTAL
Qty: 3 EACH | Refills: 11 | Status: SHIPPED | OUTPATIENT
Start: 2024-01-04

## 2024-01-04 RX ORDER — PROCHLORPERAZINE 25 MG/1
SUPPOSITORY RECTAL
Qty: 1 EACH | Refills: 4 | Status: SHIPPED | OUTPATIENT
Start: 2024-01-04

## 2024-01-04 NOTE — PROGRESS NOTES
[facility] Department of Endocrinology Diabetes and Metabolism   [address] [place] [address]   Phone: [phone]  Fax: [phone]    Date of Service: 1/4/2024  Primary Care Physician: Roger Smith MD  Provider: MOIZ Woods CNP    Reason for the visit:  DM type 2     History of Present Illness:  The history is provided by the patient. No  was used. Accuracy of the patient data is excellent.  Dejan Patino is a very pleasant 53 y.o. male seen today for diabetes management     Dejan Patino was diagnosed with diabetes at age 24 and currently on Lantus 65 units daily in the AM, Humalog 12u with meals + high ss, Jardiance 25 mg daily    Stopped Ozempic because he states it decreased his appetite too much and that he was barely eating at all.  While he is not positive that was the Ozempic, he thought it best to stop.  Reports that he continues to have decreased appetite even off the GLP-1.  States he is willing to restart if we think necessary and see how he feels.    Pt reports poor compliance with diet recently.  Eating a lot of pump control.  The patient has been checking blood sugar with Dexcom CGM:  TIR 13%  Hyperglycemia 87%  Hypoglycemia 0%  Avg glucose 269  GMI 9.7%  Most recent A1c results summarized below  Lab Results   Component Value Date/Time    LABA1C 9.1 12/06/2023 01:42 PM    LABA1C 8.8 04/12/2023 11:19 AM    LABA1C 9.1 03/15/2023 12:26 PM     Patient has had no hypoglycemic episodes   The patient has not been mindful of what has been eating and following diabetic diet as encouraged  I reviewed current medications and the patient has no issues with diabetes medications  Dejan Patino is up to date with eye exam and denied any history of diabetic retinopathy   The patient seeing podiatrist every  few months S/p amputation Lt big toe on Lt and small toe on the RTt   Microvascular complications:  No Retinopathy or Nephropathy

## 2024-01-04 NOTE — TELEPHONE ENCOUNTER
[facility] called wanted to check on rx for Humalog u-200  explained to them that is what they want in the pump it is ordered correctly

## 2024-01-09 DIAGNOSIS — E11.42 TYPE 2 DIABETES MELLITUS WITH DIABETIC POLYNEUROPATHY, WITH LONG-TERM CURRENT USE OF INSULIN (HCC): ICD-10-CM

## 2024-01-09 DIAGNOSIS — Z79.4 TYPE 2 DIABETES MELLITUS WITH DIABETIC POLYNEUROPATHY, WITH LONG-TERM CURRENT USE OF INSULIN (HCC): ICD-10-CM

## 2024-01-09 DIAGNOSIS — Z79.4 TYPE 2 DIABETES MELLITUS WITH DIABETIC PERIPHERAL ANGIOPATHY WITHOUT GANGRENE, WITH LONG-TERM CURRENT USE OF INSULIN (HCC): ICD-10-CM

## 2024-01-09 DIAGNOSIS — E11.51 TYPE 2 DIABETES MELLITUS WITH DIABETIC PERIPHERAL ANGIOPATHY WITHOUT GANGRENE, WITH LONG-TERM CURRENT USE OF INSULIN (HCC): ICD-10-CM

## 2024-01-10 RX ORDER — GABAPENTIN 300 MG/1
300 CAPSULE ORAL 3 TIMES DAILY
Qty: 270 CAPSULE | Refills: 1 | Status: SHIPPED | OUTPATIENT
Start: 2024-01-10 | End: 2024-04-09

## 2024-01-10 RX ORDER — PEN NEEDLE, DIABETIC 32 GX3/16"
NEEDLE, DISPOSABLE MISCELLANEOUS
Qty: 400 EACH | Refills: 1 | Status: SHIPPED | OUTPATIENT
Start: 2024-01-10

## 2024-01-12 DIAGNOSIS — M48.02 CERVICAL STENOSIS OF SPINE: ICD-10-CM

## 2024-01-12 DIAGNOSIS — F33.1 MODERATE RECURRENT MAJOR DEPRESSION (HCC): ICD-10-CM

## 2024-01-12 DIAGNOSIS — S12.401D CLOSED NONDISPLACED FRACTURE OF FIFTH CERVICAL VERTEBRA WITH ROUTINE HEALING, UNSPECIFIED FRACTURE MORPHOLOGY, SUBSEQUENT ENCOUNTER: ICD-10-CM

## 2024-01-12 RX ORDER — OXYCODONE AND ACETAMINOPHEN 7.5; 325 MG/1; MG/1
1 TABLET ORAL 2 TIMES DAILY PRN
Qty: 60 TABLET | Refills: 0 | Status: SHIPPED | OUTPATIENT
Start: 2024-01-12 | End: 2024-02-11

## 2024-01-12 NOTE — TELEPHONE ENCOUNTER
Last Appointment:  12/6/2023  Future Appointments   Date Time Provider Department Center   1/22/2024  2:30 PM Johann Chavez DPM AT[facility] P[facility]   4/4/2024  1:00 PM Supriya Ferraro APRN - CNP BDM ENDO [facility]   4/9/2024  1:00 PM [facility]

## 2024-01-15 DIAGNOSIS — F41.1 GAD (GENERALIZED ANXIETY DISORDER): ICD-10-CM

## 2024-01-15 NOTE — TELEPHONE ENCOUNTER
Last Appointment:  12/6/2023  Future Appointments   Date Time Provider Department Center   1/22/2024  2:30 PM Johann Chavez DPM AT[facility]   4/4/2024  1:00 PM Supriya Ferraro APRN - CNP BDM ENDO [facility]   4/9/2024  1:00 PM [facility]

## 2024-01-16 RX ORDER — BUSPIRONE HYDROCHLORIDE 15 MG/1
TABLET ORAL
Qty: 180 TABLET | Refills: 1 | Status: SHIPPED | OUTPATIENT
Start: 2024-01-16

## 2024-01-22 ENCOUNTER — OFFICE VISIT (OUTPATIENT)
Dept: PODIATRY | Age: 54
End: 2024-01-22
Payer: MEDICARE

## 2024-01-22 VITALS — HEIGHT: 78 IN | WEIGHT: 269 LBS | BODY MASS INDEX: 31.12 KG/M2

## 2024-01-22 DIAGNOSIS — G60.8 HEREDITARY SENSORY NEUROPATHY: ICD-10-CM

## 2024-01-22 DIAGNOSIS — E11.9 TYPE 2 DIABETES MELLITUS WITHOUT COMPLICATION, UNSPECIFIED WHETHER LONG TERM INSULIN USE (HCC): Primary | ICD-10-CM

## 2024-01-22 DIAGNOSIS — Z89.429 HISTORY OF AMPUTATION OF TOE (HCC): ICD-10-CM

## 2024-01-22 PROCEDURE — 3017F COLORECTAL CA SCREEN DOC REV: CPT | Performed by: PODIATRIST

## 2024-01-22 PROCEDURE — 99213 OFFICE O/P EST LOW 20 MIN: CPT | Performed by: PODIATRIST

## 2024-01-22 PROCEDURE — G8417 CALC BMI ABV UP PARAM F/U: HCPCS | Performed by: PODIATRIST

## 2024-01-22 PROCEDURE — 4004F PT TOBACCO SCREEN RCVD TLK: CPT | Performed by: PODIATRIST

## 2024-01-22 PROCEDURE — 3046F HEMOGLOBIN A1C LEVEL >9.0%: CPT | Performed by: PODIATRIST

## 2024-01-22 PROCEDURE — G8484 FLU IMMUNIZE NO ADMIN: HCPCS | Performed by: PODIATRIST

## 2024-01-22 PROCEDURE — 2022F DILAT RTA XM EVC RTNOPTHY: CPT | Performed by: PODIATRIST

## 2024-01-22 PROCEDURE — G8427 DOCREV CUR MEDS BY ELIG CLIN: HCPCS | Performed by: PODIATRIST

## 2024-01-22 NOTE — PROGRESS NOTES
daily dose 200 units, Disp: 15 Adjustable Dose Pre-filled Pen Syringe, Rfl: 5    rosuvastatin (CRESTOR) 40 MG tablet, Take 1 tablet by mouth nightly, Disp: 90 tablet, Rfl: 1    insulin glargine (LANTUS SOLOSTAR) 100 UNIT/ML injection pen, Inject 65 units at night, Disp: 24 Adjustable Dose Pre-filled Pen Syringe, Rfl: 2    metoprolol succinate (TOPROL XL) 25 MG extended release tablet, TAKE 1 TABLET BY MOUTH ONCE DAILY FOR 90 DAYS, Disp: 90 tablet, Rfl: 1    ONETOUCH VERIO strip, USE 1 STRIP TO CHECK GLUCOSE TWICE DAILY, Disp: , Rfl:     vitamin D3 (CHOLECALCIFEROL) 125 MCG (5000 UT) TABS tablet, Take 1 tablet by mouth daily, Disp: , Rfl:     aspirin 81 MG EC tablet, Take 2 tablets by mouth daily, Disp: , Rfl:     Ascorbic Acid (VITAMIN C) 1000 MG tablet, Take 1 tablet by mouth daily, Disp: , Rfl:   No Known Allergies    Past Medical History:   Diagnosis Date    Ankle ulcer, left, limited to breakdown of skin ([facility]) 9/18/2023    Anxiety     Atrial fibrillation, unspecified type ([facility])     CAD (coronary artery disease)     Cardiomyopathy ([facility])     CKD (chronic kidney disease)     Decreased dorsalis pedis pulse 9/18/2023    Depression     HTN (hypertension)     Hyperlipidemia     LBBB (left bundle branch block)     PVD (peripheral vascular disease) ([facility]) 9/18/2023    Type 2 diabetes mellitus without complication ([facility])     Varicose veins of bilateral lower extremities with other complications 9/18/2023    Mild, bilateral below the knee noted       There were no vitals filed for this visit.       Work History/Social History:   Foot and ankle history:     Focused Lower Extremity Physical Exam:      Neurovascular examination:    Dorsalis Pedis palpable bilateral.  Posterior tibialis palpable bilateral.    Capillary Refill Time:  Immediate return  Hair growth:  Symmetrical and bilateral   Skin:  Not atrophic  Edema: Mild edema bilateral feet.   Mild edema bilateral ankles.  Neurologic:  Light touch diminished bilateral.  Warm

## 2024-02-13 ENCOUNTER — TELEPHONE (OUTPATIENT)
Dept: PRIMARY CARE CLINIC | Age: 54
End: 2024-02-13

## 2024-02-13 DIAGNOSIS — Z95.1 HISTORY OF FOUR VESSEL CORONARY ARTERY BYPASS GRAFT: ICD-10-CM

## 2024-02-13 RX ORDER — METOPROLOL SUCCINATE 25 MG/1
TABLET, EXTENDED RELEASE ORAL
Qty: 90 TABLET | Refills: 1 | Status: SHIPPED
Start: 2024-02-13 | End: 2024-02-13 | Stop reason: SDUPTHER

## 2024-02-13 NOTE — TELEPHONE ENCOUNTER
----- Message from Edwar Robledo sent at 2/13/2024  1:37 PM EST -----  Subject: Refill Request    QUESTIONS  Name of Medication? oxyCODONE-acetaminophen (PERCOCET) 7.5-325 MG per   tablet  Patient-reported dosage and instructions? 7.5 mg  How many days do you have left? 0  Preferred Pharmacy? [id number]  Pharmacy phone number (if available)? [phone]  ---------------------------------------------------------------------------  --------------  CALL BACK INFO  What is the best way for the office to contact you? OK to leave message on   voicemail  Preferred Call Back Phone Number? [phone]  ---------------------------------------------------------------------------  --------------  SCRIPT ANSWERS  Relationship to Patient? Self

## 2024-02-13 NOTE — TELEPHONE ENCOUNTER
I sent refills to [facility] with a note to fill early. He just needs to call the pharmacy for follow up

## 2024-02-13 NOTE — TELEPHONE ENCOUNTER
----- Message from Edwar Robledo sent at 2/13/2024  1:35 PM EST -----  Subject: Medication Problem     Medication: metoprolol succinate (TOPROL XL) 25 MG extended release   tablet  Dosage: 25 mg  Ordering Provider: Roger church    Question/Problem: Pt dropped medication into the sink while the water was   running. Pt has no medication at all. pt would like to know what he can   do.      Pharmacy: [id number] [place] [id number] [facility]   [phone] -  [phone]    ---------------------------------------------------------------------------  --------------  CALL BACK INFO  [phone]; OK to leave message on voicemail  ---------------------------------------------------------------------------  --------------    SCRIPT ANSWERS  Relationship to Patient: Self

## 2024-02-21 DIAGNOSIS — S12.401D CLOSED NONDISPLACED FRACTURE OF FIFTH CERVICAL VERTEBRA WITH ROUTINE HEALING, UNSPECIFIED FRACTURE MORPHOLOGY, SUBSEQUENT ENCOUNTER: ICD-10-CM

## 2024-02-21 DIAGNOSIS — Z95.1 HISTORY OF FOUR VESSEL CORONARY ARTERY BYPASS GRAFT: ICD-10-CM

## 2024-02-21 DIAGNOSIS — E55.9 VITAMIN D DEFICIENCY: ICD-10-CM

## 2024-02-21 DIAGNOSIS — M48.02 CERVICAL STENOSIS OF SPINE: ICD-10-CM

## 2024-02-21 NOTE — TELEPHONE ENCOUNTER
Patient states pharmacy didn't get metoprolol, cancelled off of medication list and at pharmacy, needs resent.  Also needs Oxycodone and Vitamin D    Last Appointment:  12/6/2023  Future Appointments   Date Time Provider Department Center   4/1/2024 10:00 AM Johann Chavez, TRU [facility]   4/4/2024  1:00 PM Supriya Ferraro APRN - CNP BDM ENDO [facility]   4/9/2024  1:00 PM Roger Smith MD [facility]

## 2024-02-23 RX ORDER — METOPROLOL SUCCINATE 25 MG/1
TABLET, EXTENDED RELEASE ORAL
Qty: 90 TABLET | Refills: 1 | Status: SHIPPED | OUTPATIENT
Start: 2024-02-23

## 2024-02-23 RX ORDER — OXYCODONE AND ACETAMINOPHEN 7.5; 325 MG/1; MG/1
1 TABLET ORAL 2 TIMES DAILY PRN
Qty: 60 TABLET | Refills: 0 | Status: SHIPPED | OUTPATIENT
Start: 2024-02-23 | End: 2024-03-24

## 2024-02-29 ENCOUNTER — OFFICE VISIT (OUTPATIENT)
Dept: PRIMARY CARE CLINIC | Age: 54
End: 2024-02-29
Payer: MEDICARE

## 2024-02-29 VITALS
BODY MASS INDEX: 31.12 KG/M2 | SYSTOLIC BLOOD PRESSURE: 110 MMHG | TEMPERATURE: 97.5 F | HEIGHT: 78 IN | DIASTOLIC BLOOD PRESSURE: 80 MMHG | OXYGEN SATURATION: 98 % | HEART RATE: 83 BPM | RESPIRATION RATE: 16 BRPM | WEIGHT: 269 LBS

## 2024-02-29 DIAGNOSIS — F33.1 MODERATE RECURRENT MAJOR DEPRESSION (HCC): ICD-10-CM

## 2024-02-29 DIAGNOSIS — L84 CALLUS OF FOOT: Primary | ICD-10-CM

## 2024-02-29 DIAGNOSIS — S91.311A LACERATION OF RIGHT FOOT, INITIAL ENCOUNTER: ICD-10-CM

## 2024-02-29 PROCEDURE — 3074F SYST BP LT 130 MM HG: CPT | Performed by: FAMILY MEDICINE

## 2024-02-29 PROCEDURE — 90715 TDAP VACCINE 7 YRS/> IM: CPT | Performed by: FAMILY MEDICINE

## 2024-02-29 PROCEDURE — 3017F COLORECTAL CA SCREEN DOC REV: CPT | Performed by: FAMILY MEDICINE

## 2024-02-29 PROCEDURE — 4004F PT TOBACCO SCREEN RCVD TLK: CPT | Performed by: FAMILY MEDICINE

## 2024-02-29 PROCEDURE — 3079F DIAST BP 80-89 MM HG: CPT | Performed by: FAMILY MEDICINE

## 2024-02-29 PROCEDURE — 90471 IMMUNIZATION ADMIN: CPT | Performed by: FAMILY MEDICINE

## 2024-02-29 PROCEDURE — G8427 DOCREV CUR MEDS BY ELIG CLIN: HCPCS | Performed by: FAMILY MEDICINE

## 2024-02-29 PROCEDURE — 99214 OFFICE O/P EST MOD 30 MIN: CPT | Performed by: FAMILY MEDICINE

## 2024-02-29 PROCEDURE — G8484 FLU IMMUNIZE NO ADMIN: HCPCS | Performed by: FAMILY MEDICINE

## 2024-02-29 PROCEDURE — G8417 CALC BMI ABV UP PARAM F/U: HCPCS | Performed by: FAMILY MEDICINE

## 2024-02-29 RX ORDER — BUPROPION HYDROCHLORIDE 150 MG/1
150 TABLET ORAL EVERY MORNING
Qty: 30 TABLET | Refills: 1 | Status: SHIPPED | OUTPATIENT
Start: 2024-02-29

## 2024-02-29 ASSESSMENT — PATIENT HEALTH QUESTIONNAIRE - PHQ9
10. IF YOU CHECKED OFF ANY PROBLEMS, HOW DIFFICULT HAVE THESE PROBLEMS MADE IT FOR YOU TO DO YOUR WORK, TAKE CARE OF THINGS AT HOME, OR GET ALONG WITH OTHER PEOPLE: 0
7. TROUBLE CONCENTRATING ON THINGS, SUCH AS READING THE NEWSPAPER OR WATCHING TELEVISION: 0
SUM OF ALL RESPONSES TO PHQ QUESTIONS 1-9: 0
8. MOVING OR SPEAKING SO SLOWLY THAT OTHER PEOPLE COULD HAVE NOTICED. OR THE OPPOSITE, BEING SO FIGETY OR RESTLESS THAT YOU HAVE BEEN MOVING AROUND A LOT MORE THAN USUAL: 0
SUM OF ALL RESPONSES TO PHQ QUESTIONS 1-9: 0
5. POOR APPETITE OR OVEREATING: 0
1. LITTLE INTEREST OR PLEASURE IN DOING THINGS: 0
SUM OF ALL RESPONSES TO PHQ QUESTIONS 1-9: 0
3. TROUBLE FALLING OR STAYING ASLEEP: 0
SUM OF ALL RESPONSES TO PHQ9 QUESTIONS 1 & 2: 0
2. FEELING DOWN, DEPRESSED OR HOPELESS: 0
SUM OF ALL RESPONSES TO PHQ QUESTIONS 1-9: 0
9. THOUGHTS THAT YOU WOULD BE BETTER OFF DEAD, OR OF HURTING YOURSELF: 0
4. FEELING TIRED OR HAVING LITTLE ENERGY: 0
6. FEELING BAD ABOUT YOURSELF - OR THAT YOU ARE A FAILURE OR HAVE LET YOURSELF OR YOUR FAMILY DOWN: 0

## 2024-02-29 NOTE — PROGRESS NOTES
Dejan MONAHAN Carey  : 1970    Chief Complaint:     Chief Complaint   Patient presents with    Laceration     Patient here for Rt foot laceration 24.     HPI  DM2 with history of chronic diabetic wounds / ulcers, s/p amputation of multiple toes. Noted yesterday that his sock was covered in blood on R foot and unsure what happened. He has minimal feeling in his foot so no pain.   Needs change in depression medication. Zoloft does not seem to be enough.     Past medical, surgical, family and social histories reviewed and updated today as appropriate    Health Maintenance Due   Topic Date Due    Hepatitis B vaccine (1 of 3 - 3-dose series) 1970    Pneumococcal 0-64 years Vaccine (1 - PCV) Never done    HIV screen  Never done    Diabetic retinal exam  Never done    Hepatitis C screen  Never done    Colorectal Cancer Screen  Never done    Shingles vaccine (1 of 2) Never done    Flu vaccine (1) 2023    COVID-19 Vaccine (2023- season) 2023    Annual Wellness Visit (Medicare Advantage)  2024    Diabetic Alb to Cr ratio (uACR) test  2024    Lipids  2024       Current Outpatient Medications   Medication Sig Dispense Refill    buPROPion (WELLBUTRIN XL) 150 MG extended release tablet Take 1 tablet by mouth every morning 30 tablet 1    oxyCODONE-acetaminophen (PERCOCET) 7.5-325 MG per tablet Take 1 tablet by mouth 2 times daily as needed for Pain for up to 30 days. 60 tablet 0    metoprolol succinate (TOPROL XL) 25 MG extended release tablet TAKE 1 TABLET BY MOUTH ONCE DAILY FOR 90 DAYS 90 tablet 1    vitamin D3 (CHOLECALCIFEROL) 125 MCG (5000 UT) TABS tablet Take 1 tablet by mouth daily 90 tablet 3    busPIRone (BUSPAR) 15 MG tablet TAKE 1 TABLET BY MOUTH TWICE DAILY 180 tablet 1    sertraline (ZOLOFT) 50 MG tablet TAKE ONE TABLET BY MOUTH DAILY 90 tablet 1    gabapentin (NEURONTIN) 300 MG capsule Take 1 capsule by mouth in the morning, at noon, and at bedtime for 90 days.

## 2024-03-08 ENCOUNTER — HOSPITAL ENCOUNTER (EMERGENCY)
Age: 54
Discharge: ANOTHER HOSPITAL | End: 2024-03-09
Attending: EMERGENCY MEDICINE
Payer: MEDICARE

## 2024-03-08 ENCOUNTER — APPOINTMENT (OUTPATIENT)
Dept: GENERAL RADIOLOGY | Age: 54
End: 2024-03-08
Payer: MEDICARE

## 2024-03-08 VITALS
TEMPERATURE: 98.9 F | SYSTOLIC BLOOD PRESSURE: 138 MMHG | BODY MASS INDEX: 29.29 KG/M2 | DIASTOLIC BLOOD PRESSURE: 72 MMHG | WEIGHT: 260 LBS | OXYGEN SATURATION: 98 % | HEART RATE: 77 BPM | RESPIRATION RATE: 18 BRPM

## 2024-03-08 DIAGNOSIS — E08.621 DIABETIC ULCER OF OTHER PART OF RIGHT FOOT ASSOCIATED WITH DIABETES MELLITUS DUE TO UNDERLYING CONDITION, LIMITED TO BREAKDOWN OF SKIN (HCC): Primary | ICD-10-CM

## 2024-03-08 DIAGNOSIS — L97.511 DIABETIC ULCER OF OTHER PART OF RIGHT FOOT ASSOCIATED WITH DIABETES MELLITUS DUE TO UNDERLYING CONDITION, LIMITED TO BREAKDOWN OF SKIN (HCC): Primary | ICD-10-CM

## 2024-03-08 LAB
ALBUMIN SERPL-MCNC: 3.9 G/DL (ref 3.5–5.2)
ALP SERPL-CCNC: 89 U/L (ref 40–129)
ALT SERPL-CCNC: 26 U/L (ref 0–40)
AMYLASE SERPL-CCNC: 56 U/L (ref 20–100)
ANION GAP SERPL CALCULATED.3IONS-SCNC: 11 MMOL/L (ref 7–16)
AST SERPL-CCNC: 16 U/L (ref 0–39)
BILIRUB DIRECT SERPL-MCNC: <0.2 MG/DL (ref 0–0.3)
BILIRUB INDIRECT SERPL-MCNC: NORMAL MG/DL (ref 0–1)
BILIRUB SERPL-MCNC: 0.4 MG/DL (ref 0–1.2)
BILIRUB UR QL STRIP: NEGATIVE
BNP SERPL-MCNC: 668 PG/ML (ref 0–125)
BUN SERPL-MCNC: 17 MG/DL (ref 6–20)
CALCIUM SERPL-MCNC: 9.5 MG/DL (ref 8.6–10.2)
CHLORIDE SERPL-SCNC: 99 MMOL/L (ref 98–107)
CK SERPL-CCNC: 120 U/L (ref 20–200)
CLARITY UR: CLEAR
CO2 SERPL-SCNC: 24 MMOL/L (ref 22–29)
COLOR UR: YELLOW
COMMENT: ABNORMAL
CREAT SERPL-MCNC: 0.9 MG/DL (ref 0.7–1.2)
ERYTHROCYTE [DISTWIDTH] IN BLOOD BY AUTOMATED COUNT: 13.1 % (ref 11.5–15)
ERYTHROCYTE [SEDIMENTATION RATE] IN BLOOD BY WESTERGREN METHOD: 10 MM/HR (ref 0–15)
GFR SERPL CREATININE-BSD FRML MDRD: >60 ML/MIN/1.73M2
GLUCOSE SERPL-MCNC: 265 MG/DL (ref 74–99)
GLUCOSE UR STRIP-MCNC: >=1000 MG/DL
HCT VFR BLD AUTO: 40.3 % (ref 37–54)
HGB BLD-MCNC: 14 G/DL (ref 12.5–16.5)
HGB UR QL STRIP.AUTO: NEGATIVE
INR PPP: 1.2
KETONES UR STRIP-MCNC: ABNORMAL MG/DL
LACTATE BLDV-SCNC: 1.1 MMOL/L (ref 0.5–2.2)
LEUKOCYTE ESTERASE UR QL STRIP: NEGATIVE
LIPASE SERPL-CCNC: 36 U/L (ref 13–60)
MAGNESIUM SERPL-MCNC: 2 MG/DL (ref 1.6–2.6)
MCH RBC QN AUTO: 29.2 PG (ref 26–35)
MCHC RBC AUTO-ENTMCNC: 34.7 G/DL (ref 32–34.5)
MCV RBC AUTO: 84.1 FL (ref 80–99.9)
NITRITE UR QL STRIP: NEGATIVE
PH UR STRIP: 6 [PH] (ref 5–9)
PLATELET # BLD AUTO: 168 K/UL (ref 130–450)
PMV BLD AUTO: 10 FL (ref 7–12)
POTASSIUM SERPL-SCNC: 4.3 MMOL/L (ref 3.5–5)
PROT SERPL-MCNC: 7.1 G/DL (ref 6.4–8.3)
PROT UR STRIP-MCNC: NEGATIVE MG/DL
PROTHROMBIN TIME: 13.6 SEC (ref 9.3–12.4)
RBC # BLD AUTO: 4.79 M/UL (ref 3.8–5.8)
SODIUM SERPL-SCNC: 134 MMOL/L (ref 132–146)
SP GR UR STRIP: 1.01 (ref 1–1.03)
TROPONIN I SERPL HS-MCNC: 17 NG/L (ref 0–11)
TROPONIN I SERPL HS-MCNC: 17 NG/L (ref 0–11)
UROBILINOGEN UR STRIP-ACNC: 0.2 EU/DL (ref 0–1)
WBC OTHER # BLD: 6.7 K/UL (ref 4.5–11.5)

## 2024-03-08 PROCEDURE — 2580000003 HC RX 258: Performed by: EMERGENCY MEDICINE

## 2024-03-08 PROCEDURE — 6360000002 HC RX W HCPCS: Performed by: EMERGENCY MEDICINE

## 2024-03-08 PROCEDURE — 83735 ASSAY OF MAGNESIUM: CPT

## 2024-03-08 PROCEDURE — 83880 ASSAY OF NATRIURETIC PEPTIDE: CPT

## 2024-03-08 PROCEDURE — 82150 ASSAY OF AMYLASE: CPT

## 2024-03-08 PROCEDURE — 86140 C-REACTIVE PROTEIN: CPT

## 2024-03-08 PROCEDURE — 6370000000 HC RX 637 (ALT 250 FOR IP): Performed by: EMERGENCY MEDICINE

## 2024-03-08 PROCEDURE — 85652 RBC SED RATE AUTOMATED: CPT

## 2024-03-08 PROCEDURE — 83690 ASSAY OF LIPASE: CPT

## 2024-03-08 PROCEDURE — 80053 COMPREHEN METABOLIC PANEL: CPT

## 2024-03-08 PROCEDURE — 81003 URINALYSIS AUTO W/O SCOPE: CPT

## 2024-03-08 PROCEDURE — 85027 COMPLETE CBC AUTOMATED: CPT

## 2024-03-08 PROCEDURE — 82248 BILIRUBIN DIRECT: CPT

## 2024-03-08 PROCEDURE — 83605 ASSAY OF LACTIC ACID: CPT

## 2024-03-08 PROCEDURE — 87040 BLOOD CULTURE FOR BACTERIA: CPT

## 2024-03-08 PROCEDURE — 96365 THER/PROPH/DIAG IV INF INIT: CPT

## 2024-03-08 PROCEDURE — 82550 ASSAY OF CK (CPK): CPT

## 2024-03-08 PROCEDURE — 84484 ASSAY OF TROPONIN QUANT: CPT

## 2024-03-08 PROCEDURE — 99285 EMERGENCY DEPT VISIT HI MDM: CPT

## 2024-03-08 PROCEDURE — 85610 PROTHROMBIN TIME: CPT

## 2024-03-08 PROCEDURE — 71045 X-RAY EXAM CHEST 1 VIEW: CPT

## 2024-03-08 PROCEDURE — 73630 X-RAY EXAM OF FOOT: CPT

## 2024-03-08 RX ORDER — DOXYCYCLINE HYCLATE 100 MG/1
100 CAPSULE ORAL ONCE
Status: COMPLETED | OUTPATIENT
Start: 2024-03-08 | End: 2024-03-08

## 2024-03-08 RX ORDER — 0.9 % SODIUM CHLORIDE 0.9 %
1000 INTRAVENOUS SOLUTION INTRAVENOUS ONCE
Status: COMPLETED | OUTPATIENT
Start: 2024-03-08 | End: 2024-03-08

## 2024-03-08 RX ADMIN — DOXYCYCLINE HYCLATE 100 MG: 100 CAPSULE ORAL at 20:56

## 2024-03-08 RX ADMIN — PIPERACILLIN AND TAZOBACTAM 4500 MG: 4; .5 INJECTION, POWDER, FOR SOLUTION INTRAVENOUS at 21:04

## 2024-03-08 RX ADMIN — SODIUM CHLORIDE 1000 ML: 9 INJECTION, SOLUTION INTRAVENOUS at 20:54

## 2024-03-08 ASSESSMENT — PAIN SCALES - GENERAL: PAINLEVEL_OUTOF10: 3

## 2024-03-08 ASSESSMENT — PAIN DESCRIPTION - LOCATION: LOCATION: FOOT

## 2024-03-08 ASSESSMENT — PAIN DESCRIPTION - ORIENTATION: ORIENTATION: RIGHT

## 2024-03-08 ASSESSMENT — PAIN - FUNCTIONAL ASSESSMENT: PAIN_FUNCTIONAL_ASSESSMENT: 0-10

## 2024-03-09 ENCOUNTER — HOSPITAL ENCOUNTER (INPATIENT)
Age: 54
LOS: 2 days | Discharge: HOME HEALTH | DRG: 638 | End: 2024-03-11
Attending: STUDENT IN AN ORGANIZED HEALTH CARE EDUCATION/TRAINING PROGRAM | Admitting: STUDENT IN AN ORGANIZED HEALTH CARE EDUCATION/TRAINING PROGRAM
Payer: MEDICARE

## 2024-03-09 PROBLEM — C44.711: Status: ACTIVE | Noted: 2024-03-09

## 2024-03-09 PROBLEM — E11.621 TYPE 2 DIABETES MELLITUS WITH RIGHT DIABETIC FOOT ULCER (HCC): Status: ACTIVE | Noted: 2024-03-09

## 2024-03-09 PROBLEM — L97.519 TYPE 2 DIABETES MELLITUS WITH RIGHT DIABETIC FOOT ULCER (HCC): Status: ACTIVE | Noted: 2024-03-09

## 2024-03-09 LAB
ASO AB SERPL-ACNC: 203 IU/ML (ref 0–200)
CRP SERPL HS-MCNC: 4 MG/L (ref 0–5)
CRP SERPL HS-MCNC: 4 MG/L (ref 0–5)
EKG ATRIAL RATE: 77 BPM
EKG P AXIS: 35 DEGREES
EKG P-R INTERVAL: 226 MS
EKG Q-T INTERVAL: 434 MS
EKG QRS DURATION: 182 MS
EKG QTC CALCULATION (BAZETT): 491 MS
EKG R AXIS: -10 DEGREES
EKG T AXIS: 152 DEGREES
EKG VENTRICULAR RATE: 77 BPM
ERYTHROCYTE [SEDIMENTATION RATE] IN BLOOD BY WESTERGREN METHOD: 15 MM/HR (ref 0–15)
GLUCOSE BLD-MCNC: 128 MG/DL (ref 74–99)
GLUCOSE BLD-MCNC: 160 MG/DL (ref 74–99)
GLUCOSE BLD-MCNC: 161 MG/DL (ref 74–99)
GLUCOSE BLD-MCNC: 253 MG/DL (ref 74–99)
HBA1C MFR BLD: 9.5 % (ref 4–5.6)

## 2024-03-09 PROCEDURE — 1200000000 HC SEMI PRIVATE

## 2024-03-09 PROCEDURE — 99223 1ST HOSP IP/OBS HIGH 75: CPT | Performed by: INTERNAL MEDICINE

## 2024-03-09 PROCEDURE — 6360000002 HC RX W HCPCS: Performed by: INTERNAL MEDICINE

## 2024-03-09 PROCEDURE — 86063 ANTISTREPTOLYSIN O SCREEN: CPT

## 2024-03-09 PROCEDURE — 83036 HEMOGLOBIN GLYCOSYLATED A1C: CPT

## 2024-03-09 PROCEDURE — 6370000000 HC RX 637 (ALT 250 FOR IP): Performed by: INTERNAL MEDICINE

## 2024-03-09 PROCEDURE — 2580000003 HC RX 258: Performed by: INTERNAL MEDICINE

## 2024-03-09 PROCEDURE — 85652 RBC SED RATE AUTOMATED: CPT

## 2024-03-09 PROCEDURE — 99222 1ST HOSP IP/OBS MODERATE 55: CPT | Performed by: INTERNAL MEDICINE

## 2024-03-09 PROCEDURE — 82962 GLUCOSE BLOOD TEST: CPT

## 2024-03-09 PROCEDURE — 86403 PARTICLE AGGLUT ANTBDY SCRN: CPT

## 2024-03-09 PROCEDURE — 87205 SMEAR GRAM STAIN: CPT

## 2024-03-09 PROCEDURE — 87070 CULTURE OTHR SPECIMN AEROBIC: CPT

## 2024-03-09 PROCEDURE — 86140 C-REACTIVE PROTEIN: CPT

## 2024-03-09 RX ORDER — POTASSIUM CHLORIDE 7.45 MG/ML
10 INJECTION INTRAVENOUS PRN
Status: DISCONTINUED | OUTPATIENT
Start: 2024-03-09 | End: 2024-03-11 | Stop reason: HOSPADM

## 2024-03-09 RX ORDER — ONDANSETRON 4 MG/1
4 TABLET, ORALLY DISINTEGRATING ORAL EVERY 8 HOURS PRN
Status: DISCONTINUED | OUTPATIENT
Start: 2024-03-09 | End: 2024-03-11 | Stop reason: HOSPADM

## 2024-03-09 RX ORDER — SODIUM CHLORIDE 9 MG/ML
INJECTION, SOLUTION INTRAVENOUS PRN
Status: DISCONTINUED | OUTPATIENT
Start: 2024-03-09 | End: 2024-03-11 | Stop reason: HOSPADM

## 2024-03-09 RX ORDER — INSULIN GLARGINE 100 [IU]/ML
75 INJECTION, SOLUTION SUBCUTANEOUS NIGHTLY
Status: DISCONTINUED | OUTPATIENT
Start: 2024-03-09 | End: 2024-03-09

## 2024-03-09 RX ORDER — POLYETHYLENE GLYCOL 3350 17 G/17G
17 POWDER, FOR SOLUTION ORAL DAILY PRN
Status: DISCONTINUED | OUTPATIENT
Start: 2024-03-09 | End: 2024-03-11 | Stop reason: HOSPADM

## 2024-03-09 RX ORDER — ASPIRIN 81 MG/1
162 TABLET ORAL DAILY
Status: DISCONTINUED | OUTPATIENT
Start: 2024-03-09 | End: 2024-03-11 | Stop reason: HOSPADM

## 2024-03-09 RX ORDER — BUPROPION HYDROCHLORIDE 150 MG/1
150 TABLET ORAL EVERY MORNING
Status: DISCONTINUED | OUTPATIENT
Start: 2024-03-09 | End: 2024-03-11 | Stop reason: HOSPADM

## 2024-03-09 RX ORDER — GABAPENTIN 300 MG/1
300 CAPSULE ORAL 3 TIMES DAILY
Status: DISCONTINUED | OUTPATIENT
Start: 2024-03-09 | End: 2024-03-11 | Stop reason: HOSPADM

## 2024-03-09 RX ORDER — ONDANSETRON 2 MG/ML
4 INJECTION INTRAMUSCULAR; INTRAVENOUS EVERY 6 HOURS PRN
Status: DISCONTINUED | OUTPATIENT
Start: 2024-03-09 | End: 2024-03-11 | Stop reason: HOSPADM

## 2024-03-09 RX ORDER — BUSPIRONE HYDROCHLORIDE 5 MG/1
15 TABLET ORAL 2 TIMES DAILY
Status: DISCONTINUED | OUTPATIENT
Start: 2024-03-09 | End: 2024-03-11 | Stop reason: HOSPADM

## 2024-03-09 RX ORDER — ROSUVASTATIN CALCIUM 20 MG/1
40 TABLET, COATED ORAL NIGHTLY
Status: DISCONTINUED | OUTPATIENT
Start: 2024-03-09 | End: 2024-03-11 | Stop reason: HOSPADM

## 2024-03-09 RX ORDER — ENOXAPARIN SODIUM 100 MG/ML
30 INJECTION SUBCUTANEOUS 2 TIMES DAILY
Status: DISCONTINUED | OUTPATIENT
Start: 2024-03-09 | End: 2024-03-11 | Stop reason: HOSPADM

## 2024-03-09 RX ORDER — INSULIN LISPRO 100 [IU]/ML
0-16 INJECTION, SOLUTION INTRAVENOUS; SUBCUTANEOUS
Status: DISCONTINUED | OUTPATIENT
Start: 2024-03-09 | End: 2024-03-11 | Stop reason: HOSPADM

## 2024-03-09 RX ORDER — OXYCODONE HYDROCHLORIDE 5 MG/1
2.5 TABLET ORAL 2 TIMES DAILY PRN
Status: DISCONTINUED | OUTPATIENT
Start: 2024-03-09 | End: 2024-03-11 | Stop reason: HOSPADM

## 2024-03-09 RX ORDER — ASCORBIC ACID 500 MG
1000 TABLET ORAL DAILY
Status: DISCONTINUED | OUTPATIENT
Start: 2024-03-09 | End: 2024-03-11 | Stop reason: HOSPADM

## 2024-03-09 RX ORDER — OXYCODONE AND ACETAMINOPHEN 7.5; 325 MG/1; MG/1
1 TABLET ORAL 2 TIMES DAILY PRN
Status: DISCONTINUED | OUTPATIENT
Start: 2024-03-09 | End: 2024-03-09 | Stop reason: CLARIF

## 2024-03-09 RX ORDER — METOPROLOL SUCCINATE 25 MG/1
25 TABLET, EXTENDED RELEASE ORAL DAILY
Status: DISCONTINUED | OUTPATIENT
Start: 2024-03-09 | End: 2024-03-11 | Stop reason: HOSPADM

## 2024-03-09 RX ORDER — ACETAMINOPHEN 650 MG/1
650 SUPPOSITORY RECTAL EVERY 6 HOURS PRN
Status: DISCONTINUED | OUTPATIENT
Start: 2024-03-09 | End: 2024-03-11 | Stop reason: HOSPADM

## 2024-03-09 RX ORDER — ACETAMINOPHEN 325 MG/1
650 TABLET ORAL EVERY 6 HOURS PRN
Status: DISCONTINUED | OUTPATIENT
Start: 2024-03-09 | End: 2024-03-11 | Stop reason: HOSPADM

## 2024-03-09 RX ORDER — INSULIN LISPRO 100 [IU]/ML
0-4 INJECTION, SOLUTION INTRAVENOUS; SUBCUTANEOUS NIGHTLY
Status: DISCONTINUED | OUTPATIENT
Start: 2024-03-09 | End: 2024-03-11 | Stop reason: HOSPADM

## 2024-03-09 RX ORDER — OXYCODONE HYDROCHLORIDE AND ACETAMINOPHEN 5; 325 MG/1; MG/1
1 TABLET ORAL 2 TIMES DAILY PRN
Status: DISCONTINUED | OUTPATIENT
Start: 2024-03-09 | End: 2024-03-11 | Stop reason: HOSPADM

## 2024-03-09 RX ORDER — CHOLECALCIFEROL (VITAMIN D3) 50 MCG
5000 TABLET ORAL DAILY
Status: DISCONTINUED | OUTPATIENT
Start: 2024-03-09 | End: 2024-03-11 | Stop reason: HOSPADM

## 2024-03-09 RX ORDER — POTASSIUM CHLORIDE 20 MEQ/1
40 TABLET, EXTENDED RELEASE ORAL PRN
Status: DISCONTINUED | OUTPATIENT
Start: 2024-03-09 | End: 2024-03-11 | Stop reason: HOSPADM

## 2024-03-09 RX ORDER — SODIUM CHLORIDE 0.9 % (FLUSH) 0.9 %
5-40 SYRINGE (ML) INJECTION EVERY 12 HOURS SCHEDULED
Status: DISCONTINUED | OUTPATIENT
Start: 2024-03-09 | End: 2024-03-11 | Stop reason: HOSPADM

## 2024-03-09 RX ORDER — SODIUM CHLORIDE 0.9 % (FLUSH) 0.9 %
5-40 SYRINGE (ML) INJECTION PRN
Status: DISCONTINUED | OUTPATIENT
Start: 2024-03-09 | End: 2024-03-11 | Stop reason: HOSPADM

## 2024-03-09 RX ORDER — INSULIN LISPRO 100 [IU]/ML
10 INJECTION, SOLUTION INTRAVENOUS; SUBCUTANEOUS
Status: DISCONTINUED | OUTPATIENT
Start: 2024-03-09 | End: 2024-03-10

## 2024-03-09 RX ORDER — MAGNESIUM SULFATE IN WATER 40 MG/ML
2000 INJECTION, SOLUTION INTRAVENOUS PRN
Status: DISCONTINUED | OUTPATIENT
Start: 2024-03-09 | End: 2024-03-11 | Stop reason: HOSPADM

## 2024-03-09 RX ORDER — INSULIN GLARGINE 100 [IU]/ML
35 INJECTION, SOLUTION SUBCUTANEOUS NIGHTLY
Status: DISCONTINUED | OUTPATIENT
Start: 2024-03-09 | End: 2024-03-10

## 2024-03-09 RX ADMIN — INSULIN GLARGINE 35 UNITS: 100 INJECTION, SOLUTION SUBCUTANEOUS at 21:47

## 2024-03-09 RX ADMIN — VANCOMYCIN HYDROCHLORIDE 3000 MG: 5 INJECTION, POWDER, LYOPHILIZED, FOR SOLUTION INTRAVENOUS at 16:57

## 2024-03-09 RX ADMIN — SODIUM CHLORIDE, PRESERVATIVE FREE 10 ML: 5 INJECTION INTRAVENOUS at 21:48

## 2024-03-09 RX ADMIN — BUSPIRONE HYDROCHLORIDE 15 MG: 5 TABLET ORAL at 21:48

## 2024-03-09 RX ADMIN — BUPROPION HYDROCHLORIDE 150 MG: 150 TABLET, EXTENDED RELEASE ORAL at 10:02

## 2024-03-09 RX ADMIN — GABAPENTIN 300 MG: 300 CAPSULE ORAL at 15:10

## 2024-03-09 RX ADMIN — ENOXAPARIN SODIUM 30 MG: 100 INJECTION SUBCUTANEOUS at 21:47

## 2024-03-09 RX ADMIN — GABAPENTIN 300 MG: 300 CAPSULE ORAL at 21:48

## 2024-03-09 RX ADMIN — INSULIN LISPRO 8 UNITS: 100 INJECTION, SOLUTION INTRAVENOUS; SUBCUTANEOUS at 13:09

## 2024-03-09 RX ADMIN — GABAPENTIN 300 MG: 300 CAPSULE ORAL at 08:23

## 2024-03-09 RX ADMIN — METOPROLOL SUCCINATE 25 MG: 25 TABLET, FILM COATED, EXTENDED RELEASE ORAL at 08:24

## 2024-03-09 RX ADMIN — ASPIRIN 162 MG: 81 TABLET, COATED ORAL at 08:24

## 2024-03-09 RX ADMIN — SERTRALINE HYDROCHLORIDE 50 MG: 50 TABLET ORAL at 08:24

## 2024-03-09 RX ADMIN — ROSUVASTATIN CALCIUM 40 MG: 20 TABLET, FILM COATED ORAL at 21:48

## 2024-03-09 RX ADMIN — BUSPIRONE HYDROCHLORIDE 15 MG: 5 TABLET ORAL at 08:23

## 2024-03-09 RX ADMIN — ENOXAPARIN SODIUM 30 MG: 100 INJECTION SUBCUTANEOUS at 08:24

## 2024-03-09 RX ADMIN — OXYCODONE HYDROCHLORIDE AND ACETAMINOPHEN 1000 MG: 500 TABLET ORAL at 08:24

## 2024-03-09 RX ADMIN — SODIUM CHLORIDE, PRESERVATIVE FREE 10 ML: 5 INJECTION INTRAVENOUS at 08:24

## 2024-03-09 RX ADMIN — EMPAGLIFLOZIN 10 MG: 10 TABLET, FILM COATED ORAL at 10:02

## 2024-03-09 RX ADMIN — INSULIN LISPRO 10 UNITS: 100 INJECTION, SOLUTION INTRAVENOUS; SUBCUTANEOUS at 16:48

## 2024-03-09 NOTE — PROGRESS NOTES
Pt has hx of an atrial clip from 12/8/2020. Copy of card in chart.     Procedure listed as: LIMA-LAD, SVG-RT, DG1, SVG-OM2, SVG-PDA, LIG ANDREW W/40MM Artrial clip.     Electronically signed by Mary Franz RN on 3/9/2024 at 10:50 AM

## 2024-03-09 NOTE — H&P
I agree with all of the pertinent clinical information unless otherwise noted. I have also reviewed and agree with the past medical, family, and social history unless otherwise noted.     Patient was admitted with right foot wound.     PHYSICAL EXAM:  Vitals:  /69   Pulse 69   Temp 97.5 °F (36.4 °C) (Oral)   Resp 18   Ht 2.007 m (6' 7\")   Wt 119.3 kg (263 lb)   SpO2 97%   BMI 29.63 kg/m²   Gen: awake, alert, NAD  Lungs: clear to auscultation bilaterally no crackles no wheezing.  Heart: RRR, no murmur   Abdomen: soft nontender nondistended positive bowel sounds.  Extremities: full range of motion no peripheral edema.      Impression:  Principal Problem:    Type 2 diabetes mellitus with right diabetic foot ulcer (HCC)  Resolved Problems:    * No resolved hospital problems. *      My findings/plan include:    53 year old male with right foot wound being treated with vancomycin    NOTE: This report was transcribed using voice recognition software. Every effort was made to ensure accuracy; however, inadvertent computerized transcription errors may be present.    Electronically signed by Cecily Gutiérrez DO on 3/9/2024 at 4:07 PM

## 2024-03-09 NOTE — PROGRESS NOTES
4 Eyes Skin Assessment     NAME:  Dejan Patino  YOB: 1970  MEDICAL RECORD NUMBER:  [id number]    The patient is being assessed for  Admission    I agree that at least one RN has performed a thorough Head to Toe Skin Assessment on the patient. ALL assessment sites listed below have been assessed.      Areas assessed by both nurses:    Head, Face, Ears, Shoulders, Back, Chest, Arms, Elbows, Hands, and Legs. Feet and Heels        Does the Patient have a Wound? Yes wound(s) were present on assessment. LDA wound assessment was Initiated and completed by RN       José Miguel Prevention initiated by RN: No  Wound Care Orders initiated by RN: No; podiatry to manage     Pressure Injury (Stage 3,4, Unstageable, DTI, NWPT, and Complex wounds) if present, place Wound referral order by RN under : No    New Ostomies, if present place, Ostomy referral order under : No     Nurse 1 eSignature: Electronically signed by Mary Franz RN on 3/9/24 at 9:18 AM EST    **SHARE this note so that the co-signing nurse can place an eSignature**    Nurse 2 eSignature: Electronically signed by China Remy RN on 3/9/24 at 11:05 AM EST

## 2024-03-09 NOTE — ED NOTES
Patient discharged by previous shift RN. JOSE RAFAEL called to request patient be removed from the board. Above discharge charted and patient removed per request

## 2024-03-09 NOTE — ED PROVIDER NOTES
HPI:  3/8/24, Time: 7:36 PM FARHAD Patino is a 53 y.o. male presenting to the ED for wound on his right foot diabetic ulcer patient is a diabetic it has been there for several days, beginning several ago.  The complaint has been persistent, moderate in severity, and worsened by nothing.  He has been feeling somewhat weak his blood pressures are being been good his he is a diabetic he has had toes amputated before he does have pulses in both legs on the right foot by the fifth digit which was removed he on the plantar surface he has a necrotic ulcer on the right foot most likely he will need admitted for debridement he does see a podiatrist we are starting on Zosyn and Doxy lab work is pending    ROS:   Pertinent positives and negatives are stated within HPI, all other systems reviewed and are negative.  --------------------------------------------- PAST HISTORY ---------------------------------------------  Past Medical History:  has a past medical history of Ankle ulcer, left, limited to breakdown of skin ([facility]), Anxiety, Atrial fibrillation, unspecified type ([facility]), CAD (coronary artery disease), Cardiomyopathy ([facility]), CKD (chronic kidney disease), Decreased dorsalis pedis pulse, Depression, HTN (hypertension), Hyperlipidemia, LBBB (left bundle branch block), PVD (peripheral vascular disease) ([facility]), Type 2 diabetes mellitus without complication ([facility]), and Varicose veins of bilateral lower extremities with other complications.    Past Surgical History:  has a past surgical history that includes Foot surgery (Bilateral); Coronary artery bypass graft (12/2020); Vein Surgery; Eye Lid Surgery; other surgical history; Cardiac surgery; and fracture surgery.    Social History:  reports that he has never smoked. His smokeless tobacco use includes chew. He reports that he does not currently use alcohol. He reports that he does not use drugs.    Family History: family history includes Coronary Art Dis in his  stable        NOTE: This report was transcribed using voice recognition software. Every effort was made to ensure accuracy; however, inadvertent computerized transcription errors may be present          David Awad MD  03/08/24 3817

## 2024-03-09 NOTE — CONSULTS
ENDOCRINOLOGY INITIAL CONSULTATION NOTE      Date of admission: 3/9/2024  Date of service: 3/9/2024  Admitting physician: Aj Norman MD   Primary Care Physician: Roger Smith MD  Consultant physician: Abdulaziz Almaguer MD     Reason for the consultation:  Uncontrolled DM    History of Present Illness:  The history is provided by the patient. Accuracy of the patient data is excellent    Dejan Patino is a very pleasant 53 y.o. old male with PMH of DM type 2, HTN, HLD  and other listed below admitted to [facility] on 3/9/2024 because of infected Lt diabetic foot, endocrine service was consulted for diabetes management.    Prior to admission  The patient was diagnosed with type 2 DM long time ago. Prior to admission patient was on Lantus 75u daily, humalog per sliding scale. Just received insulin pump but didn't start training yet. Patient has had no hypoglycemic episodes. Patient has not been eating consistent carbohydrate meals, self-blood glucose monitoring has been above goal prior to admission. In addition, patient denied macrovascular or microvascular complications. The patient is not up to date with yearly diabetic eye exam.   Lab Results   Component Value Date/Time    LABA1C 9.1 12/06/2023 01:42 PM     Inpatient diet:   Carb Restricted diet     Point of care glucose monitoring   (Independently reviewed)   Recent Labs     03/09/24  0822 03/09/24  1122   POCGLU 128* 253*       Past medical history:   Past Medical History:   Diagnosis Date    Ankle ulcer, left, limited to breakdown of skin (HCC) 9/18/2023    Anxiety     Atrial fibrillation, unspecified type ([facility])     CAD (coronary artery disease)     Cardiomyopathy (HCC)     CKD (chronic kidney disease)     Decreased dorsalis pedis pulse 9/18/2023    Depression     HTN (hypertension)     Hyperlipidemia     LBBB (left bundle branch block)     PVD (peripheral vascular disease) ([facility]) 9/18/2023    Type 2 diabetes mellitus without complication ([facility])     Varicose veins of 
  Ht 2.007 m (6' 7\")   Wt 119.3 kg (263 lb)   SpO2 97%   BMI 29.63 kg/m²     LABS:   Recent Labs     03/08/24 1930   WBC 6.7   HGB 14.0   HCT 40.3        Recent Labs     03/08/24 1930      K 4.3   CL 99   CO2 24   BUN 17   CREATININE 0.9     Recent Labs     03/08/24 1930   PROT 7.1   INR 1.2       ASSESSMENTS:   - Full thickness ulcer right foot, POA, infected   - Cellulitis right foot   - Type 2 diabetes mellitus   - Peripheral neuropathy       PLAN:    - Patient was examined and evaluated.  - Reviewed patient's recent lab results and pertinent diagnostic imaging.  - Reviewed ancillary service notes.  - ABX per ID appreciate recommendations   - X-rays: 1.  Apparent ulceration of the soft tissues lateral to the 4th metatarsal phalangeal joint.  2.  No plain radiographic evidence of osteomyelitis. No evidence of acute fracture or traumatic malalignment. 3.  Degenerative or neuropathic changes within the midfoot, unchanged from the prior study.  - MRI - ordered    - Plan for possible debridement. Awaiting on imaging results.   - Discussed patient with Dr. Marr   - Will continue to follow patient while they are in-house.    Romeo Marr DPM FACFAS  Fellowship-Trained Foot and Ankle Surgeon  Diplomate, American Board of Foot and Ankle Surgeons  [phone]      Thank you for the opportunity to take part in the patient's care. Please do not hesitate to call for any questions or concerns.    Ankita Mujica PGY-2   3/9/2024  10:05 AM

## 2024-03-09 NOTE — PROGRESS NOTES
Pt has Dexcom 6 on left arm.         Electronically signed by Mary Franz RN on 3/9/2024 at 8:25 AM

## 2024-03-09 NOTE — PROGRESS NOTES
Pharmacy Consultation Note  (Antibiotic Dosing and Monitoring)    Initial consult date: 3/9  Consulting physician/provider: Marla  Drug: Vancomycin  Indication: Skin & soft tissue infection for 7 days    Age/  Gender Height Weight IBW  Allergy Information   53 y.o./male 200.7 cm (6' 7\") 119.3 kg (263 lb)     Ideal body weight: 93.7 kg (206 lb 9.1 oz)  Adjusted ideal body weight: 103.9 kg (229 lb 2.3 oz)   Patient has no known allergies.      Renal Function:  Recent Labs     03/08/24  1930   BUN 17   CREATININE 0.9       Intake/Output Summary (Last 24 hours) at 3/9/2024 1614  Last data filed at 3/9/2024 0906  Gross per 24 hour   Intake 360 ml   Output --   Net 360 ml       Vancomycin Monitoring:  Trough:  No results for input(s): \"VANCOTROUGH\" in the last 72 hours.  Random:  No results for input(s): \"VANCORANDOM\" in the last 72 hours.      Assessment:  Patient is a 53 y.o. male who has been initiated on vancomycin  Estimated Creatinine Clearance: 139 mL/min (based on SCr of 0.9 mg/dL).  No history of vancomycin levels in EPIC.    Plan:  Will order vancomycin 3000 mg IV x 1 loading dose, followed by vancomycin 1500 mg IV q12h to begin on 3/10 at 0700 for projected AUC/SHAN 400-600.  Will check vancomycin levels when appropriate  Will continue to monitor renal function   Pharmacy to follow      Osvaldo Dangelo RPH 3/9/2024 4:14 PM    JOSE RAFAEL: [phone]  SEY: [phone]  SJW: [phone]

## 2024-03-10 ENCOUNTER — APPOINTMENT (OUTPATIENT)
Dept: MRI IMAGING | Age: 54
DRG: 638 | End: 2024-03-10
Attending: STUDENT IN AN ORGANIZED HEALTH CARE EDUCATION/TRAINING PROGRAM
Payer: MEDICARE

## 2024-03-10 LAB
ALBUMIN SERPL-MCNC: 4.1 G/DL (ref 3.5–5.2)
ALP SERPL-CCNC: 83 U/L (ref 40–129)
ALT SERPL-CCNC: 33 U/L (ref 0–40)
ANION GAP SERPL CALCULATED.3IONS-SCNC: 10 MMOL/L (ref 7–16)
AST SERPL-CCNC: 23 U/L (ref 0–39)
BASOPHILS # BLD: 0.04 K/UL (ref 0–0.2)
BASOPHILS NFR BLD: 1 % (ref 0–2)
BILIRUB SERPL-MCNC: 0.5 MG/DL (ref 0–1.2)
BUN SERPL-MCNC: 13 MG/DL (ref 6–20)
CALCIUM SERPL-MCNC: 9.2 MG/DL (ref 8.6–10.2)
CHLORIDE SERPL-SCNC: 102 MMOL/L (ref 98–107)
CO2 SERPL-SCNC: 26 MMOL/L (ref 22–29)
CREAT SERPL-MCNC: 0.9 MG/DL (ref 0.7–1.2)
EOSINOPHIL # BLD: 0.07 K/UL (ref 0.05–0.5)
EOSINOPHILS RELATIVE PERCENT: 1 % (ref 0–6)
ERYTHROCYTE [DISTWIDTH] IN BLOOD BY AUTOMATED COUNT: 12.9 % (ref 11.5–15)
GFR SERPL CREATININE-BSD FRML MDRD: >60 ML/MIN/1.73M2
GLUCOSE BLD-MCNC: 124 MG/DL (ref 74–99)
GLUCOSE BLD-MCNC: 181 MG/DL (ref 74–99)
GLUCOSE BLD-MCNC: 222 MG/DL (ref 74–99)
GLUCOSE BLD-MCNC: 89 MG/DL (ref 74–99)
GLUCOSE SERPL-MCNC: 92 MG/DL (ref 74–99)
HCT VFR BLD AUTO: 42.2 % (ref 37–54)
HGB BLD-MCNC: 13.7 G/DL (ref 12.5–16.5)
IMM GRANULOCYTES # BLD AUTO: 0.03 K/UL (ref 0–0.58)
IMM GRANULOCYTES NFR BLD: 1 % (ref 0–5)
LYMPHOCYTES NFR BLD: 2.05 K/UL (ref 1.5–4)
LYMPHOCYTES RELATIVE PERCENT: 36 % (ref 20–42)
MCH RBC QN AUTO: 28.2 PG (ref 26–35)
MCHC RBC AUTO-ENTMCNC: 32.5 G/DL (ref 32–34.5)
MCV RBC AUTO: 87 FL (ref 80–99.9)
MICROORGANISM SPEC CULT: NORMAL
MICROORGANISM SPEC CULT: NORMAL
MONOCYTES NFR BLD: 0.57 K/UL (ref 0.1–0.95)
MONOCYTES NFR BLD: 10 % (ref 2–12)
NEUTROPHILS NFR BLD: 52 % (ref 43–80)
NEUTS SEG NFR BLD: 2.94 K/UL (ref 1.8–7.3)
PLATELET # BLD AUTO: 172 K/UL (ref 130–450)
PMV BLD AUTO: 9.8 FL (ref 7–12)
POTASSIUM SERPL-SCNC: 3.9 MMOL/L (ref 3.5–5)
PROT SERPL-MCNC: 7 G/DL (ref 6.4–8.3)
RBC # BLD AUTO: 4.85 M/UL (ref 3.8–5.8)
SERVICE CMNT-IMP: NORMAL
SERVICE CMNT-IMP: NORMAL
SODIUM SERPL-SCNC: 138 MMOL/L (ref 132–146)
SPECIMEN DESCRIPTION: NORMAL
SPECIMEN DESCRIPTION: NORMAL
WBC OTHER # BLD: 5.7 K/UL (ref 4.5–11.5)

## 2024-03-10 PROCEDURE — 73718 MRI LOWER EXTREMITY W/O DYE: CPT

## 2024-03-10 PROCEDURE — 82962 GLUCOSE BLOOD TEST: CPT

## 2024-03-10 PROCEDURE — 6360000002 HC RX W HCPCS: Performed by: INTERNAL MEDICINE

## 2024-03-10 PROCEDURE — 2580000003 HC RX 258: Performed by: INTERNAL MEDICINE

## 2024-03-10 PROCEDURE — 6370000000 HC RX 637 (ALT 250 FOR IP): Performed by: INTERNAL MEDICINE

## 2024-03-10 PROCEDURE — 80053 COMPREHEN METABOLIC PANEL: CPT

## 2024-03-10 PROCEDURE — 85025 COMPLETE CBC W/AUTO DIFF WBC: CPT

## 2024-03-10 PROCEDURE — 99232 SBSQ HOSP IP/OBS MODERATE 35: CPT | Performed by: INTERNAL MEDICINE

## 2024-03-10 PROCEDURE — 1200000000 HC SEMI PRIVATE

## 2024-03-10 RX ORDER — INSULIN LISPRO 100 [IU]/ML
6 INJECTION, SOLUTION INTRAVENOUS; SUBCUTANEOUS
Status: DISCONTINUED | OUTPATIENT
Start: 2024-03-10 | End: 2024-03-11 | Stop reason: HOSPADM

## 2024-03-10 RX ORDER — INSULIN GLARGINE 100 [IU]/ML
25 INJECTION, SOLUTION SUBCUTANEOUS NIGHTLY
Status: DISCONTINUED | OUTPATIENT
Start: 2024-03-10 | End: 2024-03-11 | Stop reason: HOSPADM

## 2024-03-10 RX ADMIN — SERTRALINE HYDROCHLORIDE 50 MG: 50 TABLET ORAL at 07:49

## 2024-03-10 RX ADMIN — OXYCODONE HYDROCHLORIDE AND ACETAMINOPHEN 1000 MG: 500 TABLET ORAL at 07:49

## 2024-03-10 RX ADMIN — BUPROPION HYDROCHLORIDE 150 MG: 150 TABLET, EXTENDED RELEASE ORAL at 07:45

## 2024-03-10 RX ADMIN — METOPROLOL SUCCINATE 25 MG: 25 TABLET, FILM COATED, EXTENDED RELEASE ORAL at 07:45

## 2024-03-10 RX ADMIN — BUSPIRONE HYDROCHLORIDE 15 MG: 5 TABLET ORAL at 07:45

## 2024-03-10 RX ADMIN — INSULIN LISPRO 6 UNITS: 100 INJECTION, SOLUTION INTRAVENOUS; SUBCUTANEOUS at 16:11

## 2024-03-10 RX ADMIN — GABAPENTIN 300 MG: 300 CAPSULE ORAL at 21:16

## 2024-03-10 RX ADMIN — ASPIRIN 162 MG: 81 TABLET, COATED ORAL at 07:45

## 2024-03-10 RX ADMIN — GABAPENTIN 300 MG: 300 CAPSULE ORAL at 07:45

## 2024-03-10 RX ADMIN — GABAPENTIN 300 MG: 300 CAPSULE ORAL at 14:30

## 2024-03-10 RX ADMIN — Medication 5000 UNITS: at 07:45

## 2024-03-10 RX ADMIN — EMPAGLIFLOZIN 10 MG: 10 TABLET, FILM COATED ORAL at 07:45

## 2024-03-10 RX ADMIN — INSULIN LISPRO 6 UNITS: 100 INJECTION, SOLUTION INTRAVENOUS; SUBCUTANEOUS at 11:15

## 2024-03-10 RX ADMIN — VANCOMYCIN HYDROCHLORIDE 1500 MG: 10 INJECTION, POWDER, LYOPHILIZED, FOR SOLUTION INTRAVENOUS at 06:35

## 2024-03-10 RX ADMIN — VANCOMYCIN HYDROCHLORIDE 1500 MG: 10 INJECTION, POWDER, LYOPHILIZED, FOR SOLUTION INTRAVENOUS at 18:32

## 2024-03-10 RX ADMIN — ENOXAPARIN SODIUM 30 MG: 100 INJECTION SUBCUTANEOUS at 21:15

## 2024-03-10 RX ADMIN — ROSUVASTATIN CALCIUM 40 MG: 20 TABLET, FILM COATED ORAL at 21:16

## 2024-03-10 RX ADMIN — BUSPIRONE HYDROCHLORIDE 15 MG: 5 TABLET ORAL at 21:16

## 2024-03-10 RX ADMIN — INSULIN GLARGINE 25 UNITS: 100 INJECTION, SOLUTION SUBCUTANEOUS at 21:15

## 2024-03-10 RX ADMIN — SODIUM CHLORIDE, PRESERVATIVE FREE 10 ML: 5 INJECTION INTRAVENOUS at 21:15

## 2024-03-10 RX ADMIN — INSULIN LISPRO 4 UNITS: 100 INJECTION, SOLUTION INTRAVENOUS; SUBCUTANEOUS at 11:15

## 2024-03-10 RX ADMIN — SODIUM CHLORIDE, PRESERVATIVE FREE 10 ML: 5 INJECTION INTRAVENOUS at 07:45

## 2024-03-10 RX ADMIN — ENOXAPARIN SODIUM 30 MG: 100 INJECTION SUBCUTANEOUS at 07:46

## 2024-03-10 NOTE — PROGRESS NOTES
Pharmacy Consultation Note  (Antibiotic Dosing and Monitoring)    Initial consult date: 3/9  Consulting physician/provider: Marla  Drug: Vancomycin  Indication: Skin & soft tissue infection for 7 days    Age/  Gender Height Weight IBW  Allergy Information   53 y.o./male 200.7 cm (6' 7\") 119.3 kg (263 lb)     Ideal body weight: 93.7 kg (206 lb 9.1 oz)  Adjusted ideal body weight: 103.9 kg (229 lb 2.3 oz)   Patient has no known allergies.      Renal Function:  Recent Labs     03/08/24  1930 03/10/24  0345   BUN 17 13   CREATININE 0.9 0.9       No intake or output data in the 24 hours ending 03/10/24 1025      Vancomycin Monitoring:  Trough:  No results for input(s): \"VANCOTROUGH\" in the last 72 hours.  Random:  No results for input(s): \"VANCORANDOM\" in the last 72 hours.      Assessment:  Patient is a 53 y.o. male who has been initiated on vancomycin  Estimated Creatinine Clearance: 139 mL/min (based on SCr of 0.9 mg/dL).  Creatinine clearance stable.    Plan:  Will continue vancomycin 1500 mg IV q12h.  Will order vancomycin level with morning labs on 3/11.  PLEASE HOLD VANCOMYCIN IF VANCOMYCIN LEVEL IS GREATER THAN 20 MCG/ML.  Will assess vancomycin level on 3/11 to determine future vancomycin dosing plans.   Will continue to monitor renal function   Pharmacy to follow      Osvaldo Dangelo RPH 3/10/2024 10:25 AM    SEB: [phone]  SEY: [phone]  SJW: [phone]

## 2024-03-10 NOTE — PROGRESS NOTES
ENDOCRINOLOGY PROGRESS NOTE      Date of admission: 3/9/2024  Date of service: 3/10/2024  Admitting physician: Aj Norman MD   Primary Care Physician: Roger Smith MD  Consultant physician: Abdulaziz Almaguer MD     Reason for the consultation:  Uncontrolled DM    History of Present Illness:  The history is provided by the patient. Accuracy of the patient data is excellent    Dejan Patino is a very pleasant 53 y.o. old male with PMH of DM type 2, HTN, HLD  and other listed below admitted to [facility] on 3/9/2024 because of infected Lt diabetic foot, endocrine service was consulted for diabetes management.    Subjective   The patient was seen and examined, no acute events     Inpatient diet:   Carb Restricted diet     Point of care glucose monitoring   (Independently reviewed)   Recent Labs     03/09/24  0822 03/09/24  1122 03/09/24  1647 03/09/24  1954 03/10/24  0558   POCGLU 128* 253* 160* 161* 89         Scheduled Meds:   insulin glargine  25 Units SubCUTAneous Nightly    insulin lispro  6 Units SubCUTAneous TID WC    vitamin C  1,000 mg Oral Daily    aspirin  162 mg Oral Daily    buPROPion  150 mg Oral QAM    busPIRone  15 mg Oral BID    gabapentin  300 mg Oral TID    metoprolol succinate  25 mg Oral Daily    rosuvastatin  40 mg Oral Nightly    sertraline  50 mg Oral Daily    vitamin D  5,000 Units Oral Daily    sodium chloride flush  5-40 mL IntraVENous 2 times per day    enoxaparin  30 mg SubCUTAneous BID    empagliflozin  10 mg Oral Daily    insulin lispro  0-16 Units SubCUTAneous TID WC    insulin lispro  0-4 Units SubCUTAneous Nightly    vancomycin  1,500 mg IntraVENous Q12H       PRN Meds:   sodium chloride flush, 5-40 mL, PRN  sodium chloride, , PRN  potassium chloride, 40 mEq, PRN   Or  potassium alternative oral replacement, 40 mEq, PRN   Or  potassium chloride, 10 mEq, PRN  magnesium sulfate, 2,000 mg, PRN  ondansetron, 4 mg, Q8H PRN   Or  ondansetron, 4 mg, Q6H PRN  polyethylene glycol, 17 g, Daily

## 2024-03-10 NOTE — PROGRESS NOTES
Department of Podiatry   Progress Note      Subjective:  The patient is a 53 y.o. male seen today for follow up of right foot wound. Patient has history of multiple amputations in the past. Denies pain due to peripheral neuropathy. Patient denies any N/V/D/F/C/SOB/CP and has no other pedal complaints at this time.     Past Medical History:        Diagnosis Date    Ankle ulcer, left, limited to breakdown of skin ([facility]) 9/18/2023    Anxiety     Atrial fibrillation, unspecified type ([facility])     CAD (coronary artery disease)     Cardiomyopathy ([facility])     CKD (chronic kidney disease)     Decreased dorsalis pedis pulse 9/18/2023    Depression     HTN (hypertension)     Hyperlipidemia     LBBB (left bundle branch block)     PVD (peripheral vascular disease) ([facility]) 9/18/2023    Type 2 diabetes mellitus without complication ([facility])     Varicose veins of bilateral lower extremities with other complications 9/18/2023    Mild, bilateral below the knee noted       Past Surgical History:        Procedure Laterality Date    CARDIAC SURGERY      CORONARY ARTERY BYPASS GRAFT  12/2020    EYE LID SURGERY      FOOT SURGERY Bilateral     FRACTURE SURGERY      OTHER SURGICAL HISTORY      atrial clip-has card in wallet    VEIN SURGERY         Medications Prior to Admission:    Medications Prior to Admission: buPROPion (WELLBUTRIN XL) 150 MG extended release tablet, Take 1 tablet by mouth every morning  oxyCODONE-acetaminophen (PERCOCET) 7.5-325 MG per tablet, Take 1 tablet by mouth 2 times daily as needed for Pain for up to 30 days.  metoprolol succinate (TOPROL XL) 25 MG extended release tablet, TAKE 1 TABLET BY MOUTH ONCE DAILY FOR 90 DAYS  vitamin D3 (CHOLECALCIFEROL) 125 MCG (5000 UT) TABS tablet, Take 1 tablet by mouth daily  busPIRone (BUSPAR) 15 MG tablet, TAKE 1 TABLET BY MOUTH TWICE DAILY  sertraline (ZOLOFT) 50 MG tablet, TAKE ONE TABLET BY MOUTH DAILY  gabapentin (NEURONTIN) 300 MG capsule, Take 1 capsule by mouth in the morning, at

## 2024-03-11 VITALS
HEART RATE: 70 BPM | BODY MASS INDEX: 30.43 KG/M2 | WEIGHT: 263 LBS | DIASTOLIC BLOOD PRESSURE: 59 MMHG | TEMPERATURE: 97.5 F | HEIGHT: 78 IN | OXYGEN SATURATION: 98 % | RESPIRATION RATE: 18 BRPM | SYSTOLIC BLOOD PRESSURE: 106 MMHG

## 2024-03-11 PROBLEM — Z91.119 DIETARY NONCOMPLIANCE: Status: ACTIVE | Noted: 2024-03-11

## 2024-03-11 PROBLEM — E78.2 MIXED HYPERLIPIDEMIA: Status: ACTIVE | Noted: 2024-03-11

## 2024-03-11 PROBLEM — E11.65 POORLY CONTROLLED TYPE 2 DIABETES MELLITUS (HCC): Status: ACTIVE | Noted: 2022-04-26

## 2024-03-11 LAB
ALBUMIN SERPL-MCNC: 4 G/DL (ref 3.5–5.2)
ALP SERPL-CCNC: 88 U/L (ref 40–129)
ALT SERPL-CCNC: 35 U/L (ref 0–40)
ANION GAP SERPL CALCULATED.3IONS-SCNC: 10 MMOL/L (ref 7–16)
AST SERPL-CCNC: 23 U/L (ref 0–39)
BASOPHILS # BLD: 0.06 K/UL (ref 0–0.2)
BASOPHILS NFR BLD: 1 % (ref 0–2)
BILIRUB SERPL-MCNC: 0.4 MG/DL (ref 0–1.2)
BUN SERPL-MCNC: 11 MG/DL (ref 6–20)
CALCIUM SERPL-MCNC: 9.4 MG/DL (ref 8.6–10.2)
CHLORIDE SERPL-SCNC: 101 MMOL/L (ref 98–107)
CO2 SERPL-SCNC: 27 MMOL/L (ref 22–29)
CREAT SERPL-MCNC: 0.9 MG/DL (ref 0.7–1.2)
EOSINOPHIL # BLD: 0.11 K/UL (ref 0.05–0.5)
EOSINOPHILS RELATIVE PERCENT: 2 % (ref 0–6)
ERYTHROCYTE [DISTWIDTH] IN BLOOD BY AUTOMATED COUNT: 13.1 % (ref 11.5–15)
GFR SERPL CREATININE-BSD FRML MDRD: >60 ML/MIN/1.73M2
GLUCOSE BLD-MCNC: 112 MG/DL (ref 74–99)
GLUCOSE BLD-MCNC: 168 MG/DL (ref 74–99)
GLUCOSE BLD-MCNC: 219 MG/DL (ref 74–99)
GLUCOSE SERPL-MCNC: 109 MG/DL (ref 74–99)
HCT VFR BLD AUTO: 43.9 % (ref 37–54)
HGB BLD-MCNC: 14.5 G/DL (ref 12.5–16.5)
IMM GRANULOCYTES # BLD AUTO: 0.03 K/UL (ref 0–0.58)
IMM GRANULOCYTES NFR BLD: 1 % (ref 0–5)
LYMPHOCYTES NFR BLD: 1.95 K/UL (ref 1.5–4)
LYMPHOCYTES RELATIVE PERCENT: 35 % (ref 20–42)
MCH RBC QN AUTO: 28.8 PG (ref 26–35)
MCHC RBC AUTO-ENTMCNC: 33 G/DL (ref 32–34.5)
MCV RBC AUTO: 87.1 FL (ref 80–99.9)
MICROORGANISM SPEC CULT: ABNORMAL
MICROORGANISM SPEC CULT: ABNORMAL
MICROORGANISM/AGENT SPEC: ABNORMAL
MONOCYTES NFR BLD: 0.59 K/UL (ref 0.1–0.95)
MONOCYTES NFR BLD: 11 % (ref 2–12)
NEUTROPHILS NFR BLD: 51 % (ref 43–80)
NEUTS SEG NFR BLD: 2.9 K/UL (ref 1.8–7.3)
PLATELET # BLD AUTO: 174 K/UL (ref 130–450)
PMV BLD AUTO: 9.9 FL (ref 7–12)
POTASSIUM SERPL-SCNC: 3.7 MMOL/L (ref 3.5–5)
PROT SERPL-MCNC: 7.3 G/DL (ref 6.4–8.3)
RBC # BLD AUTO: 5.04 M/UL (ref 3.8–5.8)
SODIUM SERPL-SCNC: 138 MMOL/L (ref 132–146)
SPECIMEN DESCRIPTION: ABNORMAL
VANCOMYCIN SERPL-MCNC: 11.3 UG/ML (ref 5–40)
WBC OTHER # BLD: 5.6 K/UL (ref 4.5–11.5)

## 2024-03-11 PROCEDURE — 6360000002 HC RX W HCPCS: Performed by: INTERNAL MEDICINE

## 2024-03-11 PROCEDURE — 6370000000 HC RX 637 (ALT 250 FOR IP): Performed by: INTERNAL MEDICINE

## 2024-03-11 PROCEDURE — 2580000003 HC RX 258: Performed by: INTERNAL MEDICINE

## 2024-03-11 PROCEDURE — 82962 GLUCOSE BLOOD TEST: CPT

## 2024-03-11 PROCEDURE — 80202 ASSAY OF VANCOMYCIN: CPT

## 2024-03-11 PROCEDURE — 99239 HOSP IP/OBS DSCHRG MGMT >30: CPT | Performed by: INTERNAL MEDICINE

## 2024-03-11 PROCEDURE — 36415 COLL VENOUS BLD VENIPUNCTURE: CPT

## 2024-03-11 PROCEDURE — 80053 COMPREHEN METABOLIC PANEL: CPT

## 2024-03-11 PROCEDURE — 85025 COMPLETE CBC W/AUTO DIFF WBC: CPT

## 2024-03-11 PROCEDURE — 99232 SBSQ HOSP IP/OBS MODERATE 35: CPT | Performed by: INTERNAL MEDICINE

## 2024-03-11 RX ORDER — CEPHALEXIN 500 MG/1
500 CAPSULE ORAL 4 TIMES DAILY
Qty: 28 CAPSULE | Refills: 0 | Status: SHIPPED | OUTPATIENT
Start: 2024-03-11 | End: 2024-03-18

## 2024-03-11 RX ADMIN — SERTRALINE HYDROCHLORIDE 50 MG: 50 TABLET ORAL at 08:39

## 2024-03-11 RX ADMIN — OXYCODONE HYDROCHLORIDE AND ACETAMINOPHEN 1000 MG: 500 TABLET ORAL at 08:39

## 2024-03-11 RX ADMIN — ENOXAPARIN SODIUM 30 MG: 100 INJECTION SUBCUTANEOUS at 08:39

## 2024-03-11 RX ADMIN — WATER 2000 MG: 1 INJECTION INTRAMUSCULAR; INTRAVENOUS; SUBCUTANEOUS at 08:50

## 2024-03-11 RX ADMIN — EMPAGLIFLOZIN 10 MG: 10 TABLET, FILM COATED ORAL at 08:39

## 2024-03-11 RX ADMIN — GABAPENTIN 300 MG: 300 CAPSULE ORAL at 16:57

## 2024-03-11 RX ADMIN — Medication 5000 UNITS: at 08:39

## 2024-03-11 RX ADMIN — ASPIRIN 162 MG: 81 TABLET, COATED ORAL at 08:39

## 2024-03-11 RX ADMIN — WATER 2000 MG: 1 INJECTION INTRAMUSCULAR; INTRAVENOUS; SUBCUTANEOUS at 16:56

## 2024-03-11 RX ADMIN — INSULIN LISPRO 6 UNITS: 100 INJECTION, SOLUTION INTRAVENOUS; SUBCUTANEOUS at 06:56

## 2024-03-11 RX ADMIN — INSULIN LISPRO 6 UNITS: 100 INJECTION, SOLUTION INTRAVENOUS; SUBCUTANEOUS at 17:00

## 2024-03-11 RX ADMIN — METOPROLOL SUCCINATE 25 MG: 25 TABLET, FILM COATED, EXTENDED RELEASE ORAL at 08:39

## 2024-03-11 RX ADMIN — SODIUM CHLORIDE, PRESERVATIVE FREE 10 ML: 5 INJECTION INTRAVENOUS at 08:41

## 2024-03-11 RX ADMIN — INSULIN LISPRO 4 UNITS: 100 INJECTION, SOLUTION INTRAVENOUS; SUBCUTANEOUS at 17:01

## 2024-03-11 RX ADMIN — BUSPIRONE HYDROCHLORIDE 15 MG: 5 TABLET ORAL at 08:39

## 2024-03-11 RX ADMIN — INSULIN LISPRO 6 UNITS: 100 INJECTION, SOLUTION INTRAVENOUS; SUBCUTANEOUS at 11:45

## 2024-03-11 RX ADMIN — BUPROPION HYDROCHLORIDE 150 MG: 150 TABLET, EXTENDED RELEASE ORAL at 08:39

## 2024-03-11 RX ADMIN — GABAPENTIN 300 MG: 300 CAPSULE ORAL at 08:39

## 2024-03-11 NOTE — PLAN OF CARE
Problem: Safety - Adult  Goal: Free from fall injury  3/11/2024 1730 by Asha Vargas RN  Outcome: Adequate for Discharge  3/11/2024 1105 by Asha Vargas RN  Outcome: Progressing     Problem: Pain  Goal: Verbalizes/displays adequate comfort level or baseline comfort level  3/11/2024 1730 by Asha Vargas RN  Outcome: Adequate for Discharge  3/11/2024 1105 by Asha Vargas RN  Outcome: Progressing     Problem: Skin/Tissue Integrity  Goal: Absence of new skin breakdown  3/11/2024 1730 by Asha Vargas RN  Outcome: Adequate for Discharge  3/11/2024 1105 by Asha Vargas RN  Outcome: Progressing

## 2024-03-11 NOTE — HOME CARE
[facility] received referral. Will follow after discharge. Spoke with patient and verified demo's.  Layla Ríos LPN, [facility]

## 2024-03-11 NOTE — PROGRESS NOTES
ENDOCRINOLOGY PROGRESS NOTE      Date of admission: 3/9/2024  Date of service: 3/11/2024  Admitting physician: Aj Norman MD   Primary Care Physician: Roger Smith MD  Consultant physician: Abdulaziz Almaguer MD     Reason for the consultation:  Uncontrolled DM    History of Present Illness:  The history is provided by the patient. Accuracy of the patient data is excellent    Dejan Patino is a very pleasant 53 y.o. old male with PMH of DM type 2, HTN, HLD  and other listed below admitted to [facility] on 3/9/2024 because of infected Lt diabetic foot, endocrine service was consulted for diabetes management.    Subjective   I saw and examined the patient at bedside this afternoon.  No acute events overnight.  Feels better today.  Glucose level improving    Inpatient diet:   Carb Restricted diet     Point of care glucose monitoring   (Independently reviewed)   Recent Labs     03/09/24  1954 03/10/24  0558 03/10/24  1113 03/10/24  1609 03/10/24  2113 03/11/24  0653 03/11/24  1051 03/11/24  1657   POCGLU 161* 89 222* 124* 181* 112* 168* 219*         Scheduled Meds:   ceFAZolin  2,000 mg IntraVENous Q8H    insulin glargine  25 Units SubCUTAneous Nightly    insulin lispro  6 Units SubCUTAneous TID WC    vitamin C  1,000 mg Oral Daily    aspirin  162 mg Oral Daily    buPROPion  150 mg Oral QAM    busPIRone  15 mg Oral BID    gabapentin  300 mg Oral TID    metoprolol succinate  25 mg Oral Daily    rosuvastatin  40 mg Oral Nightly    sertraline  50 mg Oral Daily    vitamin D  5,000 Units Oral Daily    sodium chloride flush  5-40 mL IntraVENous 2 times per day    enoxaparin  30 mg SubCUTAneous BID    empagliflozin  10 mg Oral Daily    insulin lispro  0-16 Units SubCUTAneous TID WC    insulin lispro  0-4 Units SubCUTAneous Nightly       PRN Meds:   sodium chloride flush, 5-40 mL, PRN  sodium chloride, , PRN  potassium chloride, 40 mEq, PRN   Or  potassium alternative oral replacement, 40 mEq, PRN   Or  potassium chloride, 10 mEq,

## 2024-03-11 NOTE — PROGRESS NOTES
Call placed to [facility] to notify them that patient will be dc'd today and to have walker delivered to home after discharge.    Electronically signed by Kristi Frank RN on 3/11/2024 at 4:33 PM

## 2024-03-11 NOTE — PLAN OF CARE
Problem: Safety - Adult  Goal: Free from fall injury  3/11/2024 1105 by Asha Vargas RN  Outcome: Progressing  3/11/2024 0054 by Mary Read RN  Outcome: Progressing     Problem: Pain  Goal: Verbalizes/displays adequate comfort level or baseline comfort level  3/11/2024 1105 by Asha Vargas RN  Outcome: Progressing  3/11/2024 0054 by Mary Read RN  Outcome: Progressing     Problem: Skin/Tissue Integrity  Goal: Absence of new skin breakdown  3/11/2024 1105 by Asha Vargas RN  Outcome: Progressing  3/11/2024 0054 by Mary Read RN  Outcome: Progressing

## 2024-03-11 NOTE — PROGRESS NOTES
SPIR[facility]UAL HEALTH [facility] - [place] Encounter    Name: Dejan Patino                  Referral: Routine Visit    Sacraments  Anointed (Last Rites): Yes  Apostolic [place]: No  Confession: No  Communion: Declined     Assessment:  Patient receptive to  visit.      Intervention:   provided spiritual support and sacramental ministry for patient.     Outcome:  Patient expressed gratitude for visit.    Plan:  Chaplains will remain available to offer spiritual and emotional support as needed.      Electronically signed by Chaplain Kyleigh, on 3/11/2024 at 1:43 PM.  Spiritual Care Department  [facility]  [phone]

## 2024-03-11 NOTE — PLAN OF CARE
Problem: Safety - Adult  Goal: Free from fall injury  3/11/2024 0054 by Mary Read RN  Outcome: Progressing  3/10/2024 1054 by Mary Franz RN  Outcome: Progressing     Problem: Pain  Goal: Verbalizes/displays adequate comfort level or baseline comfort level  3/11/2024 0054 by Mary Read RN  Outcome: Progressing  3/10/2024 1054 by Mary Franz RN  Outcome: Progressing     Problem: Skin/Tissue Integrity  Goal: Absence of new skin breakdown  Description: 1.  Monitor for areas of redness and/or skin breakdown  2.  Assess vascular access sites hourly  3.  Every 4-6 hours minimum:  Change oxygen saturation probe site  4.  Every 4-6 hours:  If on nasal continuous positive airway pressure, respiratory therapy assess nares and determine need for appliance change or resting period.  3/11/2024 0054 by Mary Read RN  Outcome: Progressing  3/10/2024 1054 by Mary Franz RN  Outcome: Progressing

## 2024-03-11 NOTE — DISCHARGE SUMMARY
blood glucose test strips     oxyCODONE-acetaminophen 7.5-325 MG per tablet  Commonly known as: Percocet  Take 1 tablet by mouth 2 times daily as needed for Pain for up to 30 days.     rosuvastatin 40 MG tablet  Commonly known as: Crestor  Take 1 tablet by mouth nightly     sertraline 50 MG tablet  Commonly known as: ZOLOFT  TAKE ONE TABLET BY MOUTH DAILY     vitamin C 1000 MG tablet     vitamin D3 125 MCG (5000 UT) Tabs tablet  Commonly known as: CHOLECALCIFEROL  Take 1 tablet by mouth daily           * This list has 2 medication(s) that are the same as other medications prescribed for you. Read the directions carefully, and ask your doctor or other care provider to review them with you.                STOP taking these medications      lisinopril 10 MG tablet  Commonly known as: PRINIVIL;ZESTRIL               Where to Get Your Medications        These medications were sent to [id number] [place], [place] - [address] [facility] -  [phone] -  [phone]  [address] [place] [id number]      Phone: [phone]   cephALEXin 500 MG capsule           Note that more than 30 minutes was spent in preparing discharge papers, discussing discharge with patient, medication review, etc.    Signed:  Electronically signed by Martínez Carney MD on 3/11/2024 at 3:52 PM

## 2024-03-11 NOTE — CARE COORDINATION
Social Work discharge planning  SW met with pt, who lives alone 3 steps to enter, 1 story, laundry in basement. He has a Dexacom and a glucometer at home. His daughter and ex wife at in [facility].  Pt has no dme nor hhc now.. He plans to order a knee scooter from [employer], but he will need a ww for his height. SW offered hhc and dme choices. Pt had no preference as long as covered by his insurance. [facility] and Yvan with [facility] dme. Will need to call [facility] dme to deliver ww once date of discharge known. Will also need hhc orders. Wound care? Iv atb ? Social Work to follow for support and discharge planning.  Electronically signed by VICTOR M Benavidez on 3/11/2024 at 2:20 PM

## 2024-03-11 NOTE — PATIENT CARE CONFERENCE
P Quality Flow/Interdisciplinary Rounds Progress Note        Quality Flow Rounds held on March 11, 2024    Disciplines Attending:  Bedside Nurse, , , and Nursing Unit Leadership    Dejanrenny Patino was admitted on 3/9/2024  6:49 AM    Anticipated Discharge Date:       Disposition:    José Miguel Score:  José Miguel Scale Score: 20    Readmission Risk              Risk of Unplanned Readmission:  13           Discussed patient goal for the day, patient clinical progression, and barriers to discharge.  The following Goal(s) of the Day/Commitment(s) have been identified:  Diagnostics - Report Results and Labs - Report Results      Kristi Frank RN  March 11, 2024

## 2024-03-11 NOTE — PROGRESS NOTES
Department of Podiatry   Progress Note      Subjective:  The patient is a 53 y.o. male seen today for follow up of right foot wound. Patient has history of multiple amputations in the past. Denies pain due to peripheral neuropathy. Patient denies any N/V/D/F/C/SOB/CP and has no other pedal complaints at this time.     Past Medical History:        Diagnosis Date    Ankle ulcer, left, limited to breakdown of skin ([facility]) 9/18/2023    Anxiety     Atrial fibrillation, unspecified type ([facility])     CAD (coronary artery disease)     Cardiomyopathy ([facility])     CKD (chronic kidney disease)     Decreased dorsalis pedis pulse 9/18/2023    Depression     HTN (hypertension)     Hyperlipidemia     LBBB (left bundle branch block)     PVD (peripheral vascular disease) ([facility]) 9/18/2023    Type 2 diabetes mellitus without complication ([facility])     Varicose veins of bilateral lower extremities with other complications 9/18/2023    Mild, bilateral below the knee noted       Past Surgical History:        Procedure Laterality Date    CARDIAC SURGERY      CORONARY ARTERY BYPASS GRAFT  12/2020    EYE LID SURGERY      FOOT SURGERY Bilateral     FRACTURE SURGERY      OTHER SURGICAL HISTORY      atrial clip-has card in wallet    VEIN SURGERY         Medications Prior to Admission:    Medications Prior to Admission: buPROPion (WELLBUTRIN XL) 150 MG extended release tablet, Take 1 tablet by mouth every morning  oxyCODONE-acetaminophen (PERCOCET) 7.5-325 MG per tablet, Take 1 tablet by mouth 2 times daily as needed for Pain for up to 30 days.  metoprolol succinate (TOPROL XL) 25 MG extended release tablet, TAKE 1 TABLET BY MOUTH ONCE DAILY FOR 90 DAYS  vitamin D3 (CHOLECALCIFEROL) 125 MCG (5000 UT) TABS tablet, Take 1 tablet by mouth daily  busPIRone (BUSPAR) 15 MG tablet, TAKE 1 TABLET BY MOUTH TWICE DAILY  sertraline (ZOLOFT) 50 MG tablet, TAKE ONE TABLET BY MOUTH DAILY  gabapentin (NEURONTIN) 300 MG capsule, Take 1 capsule by mouth in the morning, at

## 2024-03-11 NOTE — PROGRESS NOTES
Discharge instructions reviewed with patient, all questions answered. IV was removed. Patient had all belongings intact at time of discharge.

## 2024-03-13 LAB
EKG ATRIAL RATE: 77 BPM
EKG P AXIS: 35 DEGREES
EKG P-R INTERVAL: 226 MS
EKG Q-T INTERVAL: 434 MS
EKG QRS DURATION: 182 MS
EKG QTC CALCULATION (BAZETT): 491 MS
EKG R AXIS: -10 DEGREES
EKG T AXIS: 152 DEGREES
EKG VENTRICULAR RATE: 77 BPM

## 2024-03-14 LAB
MICROORGANISM SPEC CULT: NORMAL
MICROORGANISM SPEC CULT: NORMAL
SERVICE CMNT-IMP: NORMAL
SERVICE CMNT-IMP: NORMAL
SPECIMEN DESCRIPTION: NORMAL
SPECIMEN DESCRIPTION: NORMAL

## 2024-03-14 ASSESSMENT — ENCOUNTER SYMPTOMS
GASTROINTESTINAL NEGATIVE: 1
COLOR CHANGE: 0

## 2024-03-28 ENCOUNTER — OFFICE VISIT (OUTPATIENT)
Dept: PRIMARY CARE CLINIC | Age: 54
End: 2024-03-28
Payer: MEDICARE

## 2024-03-28 VITALS
SYSTOLIC BLOOD PRESSURE: 123 MMHG | HEIGHT: 78 IN | WEIGHT: 263 LBS | BODY MASS INDEX: 30.43 KG/M2 | RESPIRATION RATE: 16 BRPM | HEART RATE: 75 BPM | OXYGEN SATURATION: 100 % | TEMPERATURE: 97.2 F | DIASTOLIC BLOOD PRESSURE: 84 MMHG

## 2024-03-28 DIAGNOSIS — E11.51 TYPE 2 DIABETES MELLITUS WITH DIABETIC PERIPHERAL ANGIOPATHY WITHOUT GANGRENE, WITH LONG-TERM CURRENT USE OF INSULIN (HCC): ICD-10-CM

## 2024-03-28 DIAGNOSIS — F41.1 GAD (GENERALIZED ANXIETY DISORDER): ICD-10-CM

## 2024-03-28 DIAGNOSIS — I73.9 PVD (PERIPHERAL VASCULAR DISEASE) (HCC): ICD-10-CM

## 2024-03-28 DIAGNOSIS — S12.401D CLOSED NONDISPLACED FRACTURE OF FIFTH CERVICAL VERTEBRA WITH ROUTINE HEALING, UNSPECIFIED FRACTURE MORPHOLOGY, SUBSEQUENT ENCOUNTER: ICD-10-CM

## 2024-03-28 DIAGNOSIS — Z95.1 HISTORY OF FOUR VESSEL CORONARY ARTERY BYPASS GRAFT: ICD-10-CM

## 2024-03-28 DIAGNOSIS — Z00.00 MEDICARE ANNUAL WELLNESS VISIT, SUBSEQUENT: Primary | ICD-10-CM

## 2024-03-28 DIAGNOSIS — Z79.4 TYPE 2 DIABETES MELLITUS WITH DIABETIC PERIPHERAL ANGIOPATHY WITHOUT GANGRENE, WITH LONG-TERM CURRENT USE OF INSULIN (HCC): ICD-10-CM

## 2024-03-28 DIAGNOSIS — E11.610 CHARCOT FOOT DUE TO DIABETES MELLITUS (HCC): ICD-10-CM

## 2024-03-28 DIAGNOSIS — M48.02 CERVICAL STENOSIS OF SPINE: ICD-10-CM

## 2024-03-28 DIAGNOSIS — Z79.4 TYPE 2 DIABETES MELLITUS WITH DIABETIC POLYNEUROPATHY, WITH LONG-TERM CURRENT USE OF INSULIN (HCC): ICD-10-CM

## 2024-03-28 DIAGNOSIS — E11.42 POLYNEUROPATHY DUE TO TYPE 2 DIABETES MELLITUS (HCC): ICD-10-CM

## 2024-03-28 DIAGNOSIS — E11.65 POORLY CONTROLLED TYPE 2 DIABETES MELLITUS (HCC): ICD-10-CM

## 2024-03-28 DIAGNOSIS — E11.42 TYPE 2 DIABETES MELLITUS WITH DIABETIC POLYNEUROPATHY, WITH LONG-TERM CURRENT USE OF INSULIN (HCC): ICD-10-CM

## 2024-03-28 DIAGNOSIS — F33.1 MODERATE RECURRENT MAJOR DEPRESSION (HCC): ICD-10-CM

## 2024-03-28 PROCEDURE — G8484 FLU IMMUNIZE NO ADMIN: HCPCS | Performed by: FAMILY MEDICINE

## 2024-03-28 PROCEDURE — 3079F DIAST BP 80-89 MM HG: CPT | Performed by: FAMILY MEDICINE

## 2024-03-28 PROCEDURE — 3074F SYST BP LT 130 MM HG: CPT | Performed by: FAMILY MEDICINE

## 2024-03-28 PROCEDURE — G0439 PPPS, SUBSEQ VISIT: HCPCS | Performed by: FAMILY MEDICINE

## 2024-03-28 PROCEDURE — 3017F COLORECTAL CA SCREEN DOC REV: CPT | Performed by: FAMILY MEDICINE

## 2024-03-28 PROCEDURE — 3046F HEMOGLOBIN A1C LEVEL >9.0%: CPT | Performed by: FAMILY MEDICINE

## 2024-03-28 RX ORDER — OXYCODONE AND ACETAMINOPHEN 7.5; 325 MG/1; MG/1
1 TABLET ORAL 2 TIMES DAILY PRN
Qty: 60 TABLET | Refills: 0 | Status: SHIPPED | OUTPATIENT
Start: 2024-04-01 | End: 2024-05-01

## 2024-03-28 RX ORDER — BUPROPION HYDROCHLORIDE 150 MG/1
150 TABLET ORAL EVERY MORNING
Qty: 90 TABLET | Refills: 1 | Status: SHIPPED | OUTPATIENT
Start: 2024-03-28

## 2024-03-28 RX ORDER — ROSUVASTATIN CALCIUM 40 MG/1
40 TABLET, COATED ORAL NIGHTLY
Qty: 90 TABLET | Refills: 1 | Status: SHIPPED | OUTPATIENT
Start: 2024-03-28

## 2024-03-28 RX ORDER — BUSPIRONE HYDROCHLORIDE 15 MG/1
TABLET ORAL
Qty: 180 TABLET | Refills: 1 | Status: SHIPPED | OUTPATIENT
Start: 2024-03-28

## 2024-03-28 RX ORDER — GABAPENTIN 300 MG/1
300 CAPSULE ORAL 3 TIMES DAILY
Qty: 270 CAPSULE | Refills: 1 | Status: SHIPPED | OUTPATIENT
Start: 2024-03-28 | End: 2024-06-26

## 2024-03-28 RX ORDER — OXYCODONE AND ACETAMINOPHEN 7.5; 325 MG/1; MG/1
1 TABLET ORAL 2 TIMES DAILY PRN
Qty: 60 TABLET | Refills: 0 | Status: SHIPPED
Start: 2024-03-28 | End: 2024-03-28 | Stop reason: SDUPTHER

## 2024-03-28 ASSESSMENT — COLUMBIA-SUICIDE SEVERITY RATING SCALE - C-SSRS
2. HAVE YOU ACTUALLY HAD ANY THOUGHTS OF KILLING YOURSELF?: NO
6. HAVE YOU EVER DONE ANYTHING, STARTED TO DO ANYTHING, OR PREPARED TO DO ANYTHING TO END YOUR LIFE?: NO
1. WITHIN THE PAST MONTH, HAVE YOU WISHED YOU WERE DEAD OR WISHED YOU COULD GO TO SLEEP AND NOT WAKE UP?: NO

## 2024-03-28 ASSESSMENT — PATIENT HEALTH QUESTIONNAIRE - PHQ9
8. MOVING OR SPEAKING SO SLOWLY THAT OTHER PEOPLE COULD HAVE NOTICED. OR THE OPPOSITE, BEING SO FIGETY OR RESTLESS THAT YOU HAVE BEEN MOVING AROUND A LOT MORE THAN USUAL: SEVERAL DAYS
5. POOR APPETITE OR OVEREATING: SEVERAL DAYS
6. FEELING BAD ABOUT YOURSELF - OR THAT YOU ARE A FAILURE OR HAVE LET YOURSELF OR YOUR FAMILY DOWN: SEVERAL DAYS
SUM OF ALL RESPONSES TO PHQ QUESTIONS 1-9: 9
7. TROUBLE CONCENTRATING ON THINGS, SUCH AS READING THE NEWSPAPER OR WATCHING TELEVISION: SEVERAL DAYS
SUM OF ALL RESPONSES TO PHQ QUESTIONS 1-9: 9
3. TROUBLE FALLING OR STAYING ASLEEP: SEVERAL DAYS
9. THOUGHTS THAT YOU WOULD BE BETTER OFF DEAD, OR OF HURTING YOURSELF: SEVERAL DAYS
10. IF YOU CHECKED OFF ANY PROBLEMS, HOW DIFFICULT HAVE THESE PROBLEMS MADE IT FOR YOU TO DO YOUR WORK, TAKE CARE OF THINGS AT HOME, OR GET ALONG WITH OTHER PEOPLE: SOMEWHAT DIFFICULT
SUM OF ALL RESPONSES TO PHQ QUESTIONS 1-9: 8
SUM OF ALL RESPONSES TO PHQ QUESTIONS 1-9: 9
2. FEELING DOWN, DEPRESSED OR HOPELESS: SEVERAL DAYS
4. FEELING TIRED OR HAVING LITTLE ENERGY: SEVERAL DAYS
1. LITTLE INTEREST OR PLEASURE IN DOING THINGS: SEVERAL DAYS
SUM OF ALL RESPONSES TO PHQ9 QUESTIONS 1 & 2: 2

## 2024-03-28 ASSESSMENT — LIFESTYLE VARIABLES
HOW OFTEN DO YOU HAVE A DRINK CONTAINING ALCOHOL: NEVER
HOW MANY STANDARD DRINKS CONTAINING ALCOHOL DO YOU HAVE ON A TYPICAL DAY: PATIENT DOES NOT DRINK

## 2024-03-28 NOTE — PROGRESS NOTES
Medicare Annual Wellness Visit    Dejan Patino is here for No chief complaint on file.    Assessment & Plan   Medicare annual wellness visit, subsequent  Moderate recurrent major depression (HCC)  -     buPROPion (WELLBUTRIN XL) 150 MG extended release tablet; Take 1 tablet by mouth every morning, Disp-90 tablet, R-1Normal  -     sertraline (ZOLOFT) 50 MG tablet; TAKE ONE TABLET BY MOUTH DAILY, Disp-90 tablet, R-1Normal  Controlled on present regimen, continue same medications, continue to monitor with labs, back in 6 mos.   RAQUEL (generalized anxiety disorder)  -     busPIRone (BUSPAR) 15 MG tablet; TAKE 1 TABLET BY MOUTH TWICE DAILY, Disp-180 tablet, R-1Normal  Controlled on present regimen, continue same medications, continue to monitor with labs, back in 6 mos.   Type 2 diabetes mellitus with diabetic polyneuropathy, with long-term current use of insulin (HCC)  -     empagliflozin (JARDIANCE) 25 MG tablet; Indications: This medication can cause dehydration if blood sugars are not controlled. TAKE 1 TABLET BY MOUTH ONCE DAILY FOR 90 DAYS, Disp-90 tablet, R-1Normal  -     gabapentin (NEURONTIN) 300 MG capsule; Take 1 capsule by mouth in the morning, at noon, and at bedtime for 90 days., Disp-270 capsule, R-1Normal  Poorly controlled. The patient is asked to make an attempt to improve diet and exercise patterns to aid in medical management of this problem.   Type 2 diabetes mellitus with diabetic peripheral angiopathy without gangrene, with long-term current use of insulin (HCC)  -     gabapentin (NEURONTIN) 300 MG capsule; Take 1 capsule by mouth in the morning, at noon, and at bedtime for 90 days., Disp-270 capsule, R-1Normal  Cervical stenosis of spine  -     oxyCODONE-acetaminophen (PERCOCET) 7.5-325 MG per tablet; Take 1 tablet by mouth 2 times daily as needed for Pain for up to 30 days., Disp-60 tablet, R-0Normal  History of four vessel coronary artery bypass graft  -     rosuvastatin (CRESTOR) 40 MG tablet;

## 2024-03-28 NOTE — PATIENT INSTRUCTIONS

## 2024-05-06 DIAGNOSIS — M48.02 CERVICAL STENOSIS OF SPINE: ICD-10-CM

## 2024-05-06 DIAGNOSIS — S12.401D CLOSED NONDISPLACED FRACTURE OF FIFTH CERVICAL VERTEBRA WITH ROUTINE HEALING, UNSPECIFIED FRACTURE MORPHOLOGY, SUBSEQUENT ENCOUNTER: ICD-10-CM

## 2024-05-07 RX ORDER — OXYCODONE AND ACETAMINOPHEN 7.5; 325 MG/1; MG/1
1 TABLET ORAL 2 TIMES DAILY PRN
Qty: 60 TABLET | Refills: 0 | Status: SHIPPED | OUTPATIENT
Start: 2024-05-07 | End: 2024-06-06

## 2024-05-15 ENCOUNTER — APPOINTMENT (OUTPATIENT)
Dept: GENERAL RADIOLOGY | Age: 54
DRG: 617 | End: 2024-05-15
Payer: MEDICARE

## 2024-05-15 ENCOUNTER — HOSPITAL ENCOUNTER (INPATIENT)
Age: 54
LOS: 6 days | Discharge: HOME HEALTH | DRG: 617 | End: 2024-05-21
Attending: FAMILY MEDICINE | Admitting: FAMILY MEDICINE
Payer: MEDICARE

## 2024-05-15 DIAGNOSIS — E10.621 DIABETIC ULCER OF RIGHT FOOT ASSOCIATED WITH TYPE 1 DIABETES MELLITUS, LIMITED TO BREAKDOWN OF SKIN, UNSPECIFIED PART OF FOOT (HCC): ICD-10-CM

## 2024-05-15 DIAGNOSIS — L97.511 DIABETIC ULCER OF RIGHT FOOT ASSOCIATED WITH TYPE 1 DIABETES MELLITUS, LIMITED TO BREAKDOWN OF SKIN, UNSPECIFIED PART OF FOOT (HCC): ICD-10-CM

## 2024-05-15 DIAGNOSIS — L03.115 CELLULITIS OF RIGHT LOWER EXTREMITY: Primary | ICD-10-CM

## 2024-05-15 DIAGNOSIS — L97.512 FOOT ULCERATION, RIGHT, WITH FAT LAYER EXPOSED (HCC): ICD-10-CM

## 2024-05-15 PROBLEM — E11.621 DIABETIC ULCER OF RIGHT FOOT DUE TO TYPE 2 DIABETES MELLITUS (HCC): Status: ACTIVE | Noted: 2024-05-15

## 2024-05-15 PROBLEM — L97.321 ANKLE ULCER, LEFT, LIMITED TO BREAKDOWN OF SKIN (HCC): Status: RESOLVED | Noted: 2023-09-18 | Resolved: 2024-05-15

## 2024-05-15 PROBLEM — L97.929 NON-PRESSURE CHRONIC ULCER OF UNSPECIFIED PART OF LEFT LOWER LEG WITH UNSPECIFIED SEVERITY (HCC): Status: RESOLVED | Noted: 2017-02-28 | Resolved: 2024-05-15

## 2024-05-15 PROBLEM — L97.519 DIABETIC ULCER OF RIGHT FOOT DUE TO TYPE 2 DIABETES MELLITUS (HCC): Status: ACTIVE | Noted: 2024-05-15

## 2024-05-15 PROBLEM — S98.131A AMPUTATION OF FIFTH TOE OF RIGHT FOOT (HCC): Status: RESOLVED | Noted: 2022-04-26 | Resolved: 2024-05-15

## 2024-05-15 PROBLEM — I83.029 VENOUS ULCER OF LEFT LEG (HCC): Status: RESOLVED | Noted: 2017-02-28 | Resolved: 2024-05-15

## 2024-05-15 PROBLEM — I83.10 VENOUS ECZEMA: Status: RESOLVED | Noted: 2017-02-28 | Resolved: 2024-05-15

## 2024-05-15 PROBLEM — I95.1 ORTHOSTATIC HYPOTENSION: Status: RESOLVED | Noted: 2022-08-05 | Resolved: 2024-05-15

## 2024-05-15 PROBLEM — M86.679 CHRONIC OSTEOMYELITIS INVOLVING ANKLE AND FOOT (HCC): Status: RESOLVED | Noted: 2022-07-01 | Resolved: 2024-05-15

## 2024-05-15 PROBLEM — R09.89 DECREASED DORSALIS PEDIS PULSE: Status: RESOLVED | Noted: 2023-09-18 | Resolved: 2024-05-15

## 2024-05-15 PROBLEM — S12.401A CLOSED NONDISPLACED FRACTURE OF FIFTH CERVICAL VERTEBRA (HCC): Status: RESOLVED | Noted: 2022-08-03 | Resolved: 2024-05-15

## 2024-05-15 PROBLEM — L97.929 VENOUS ULCER OF LEFT LEG (HCC): Status: RESOLVED | Noted: 2017-02-28 | Resolved: 2024-05-15

## 2024-05-15 PROBLEM — L97.509 DIABETIC FOOT ULCER (HCC): Status: RESOLVED | Noted: 2022-04-26 | Resolved: 2024-05-15

## 2024-05-15 PROBLEM — Z86.31 PERSONAL HISTORY OF DIABETIC FOOT ULCER: Status: RESOLVED | Noted: 2022-04-26 | Resolved: 2024-05-15

## 2024-05-15 PROBLEM — E11.621 DIABETIC FOOT ULCER (HCC): Status: RESOLVED | Noted: 2022-04-26 | Resolved: 2024-05-15

## 2024-05-15 PROBLEM — S98.112A AMPUTATION OF LEFT GREAT TOE (HCC): Status: RESOLVED | Noted: 2022-04-26 | Resolved: 2024-05-15

## 2024-05-15 LAB
ABO + RH BLD: NORMAL
ALBUMIN SERPL-MCNC: 4.4 G/DL (ref 3.5–5.2)
ALP SERPL-CCNC: 106 U/L (ref 40–129)
ALT SERPL-CCNC: 34 U/L (ref 0–40)
ANION GAP SERPL CALCULATED.3IONS-SCNC: 15 MMOL/L (ref 7–16)
ARM BAND NUMBER: NORMAL
AST SERPL-CCNC: 27 U/L (ref 0–39)
BASOPHILS # BLD: 0.04 K/UL (ref 0–0.2)
BASOPHILS NFR BLD: 0 % (ref 0–2)
BILIRUB SERPL-MCNC: 0.8 MG/DL (ref 0–1.2)
BLOOD BANK SAMPLE EXPIRATION: NORMAL
BLOOD GROUP ANTIBODIES SERPL: NEGATIVE
BUN SERPL-MCNC: 22 MG/DL (ref 6–20)
CALCIUM SERPL-MCNC: 9.2 MG/DL (ref 8.6–10.2)
CHLORIDE SERPL-SCNC: 93 MMOL/L (ref 98–107)
CO2 SERPL-SCNC: 26 MMOL/L (ref 22–29)
CREAT SERPL-MCNC: 1 MG/DL (ref 0.7–1.2)
EOSINOPHIL # BLD: 0.07 K/UL (ref 0.05–0.5)
EOSINOPHILS RELATIVE PERCENT: 1 % (ref 0–6)
ERYTHROCYTE [DISTWIDTH] IN BLOOD BY AUTOMATED COUNT: 13.1 % (ref 11.5–15)
GFR, ESTIMATED: >90 ML/MIN/1.73M2
GLUCOSE BLD-MCNC: 117 MG/DL (ref 74–99)
GLUCOSE SERPL-MCNC: 153 MG/DL (ref 74–99)
HCT VFR BLD AUTO: 42.9 % (ref 37–54)
HGB BLD-MCNC: 14.2 G/DL (ref 12.5–16.5)
IMM GRANULOCYTES # BLD AUTO: 0.08 K/UL (ref 0–0.58)
IMM GRANULOCYTES NFR BLD: 1 % (ref 0–5)
INR PPP: 1.3
LACTATE BLDV-SCNC: 1.4 MMOL/L (ref 0.5–1.9)
LACTATE BLDV-SCNC: 1.6 MMOL/L (ref 0.5–1.9)
LIPASE SERPL-CCNC: 26 U/L (ref 13–60)
LYMPHOCYTES NFR BLD: 1.13 K/UL (ref 1.5–4)
LYMPHOCYTES RELATIVE PERCENT: 9 % (ref 20–42)
MCH RBC QN AUTO: 28.7 PG (ref 26–35)
MCHC RBC AUTO-ENTMCNC: 33.1 G/DL (ref 32–34.5)
MCV RBC AUTO: 86.7 FL (ref 80–99.9)
MONOCYTES NFR BLD: 1.29 K/UL (ref 0.1–0.95)
MONOCYTES NFR BLD: 11 % (ref 2–12)
NEUTROPHILS NFR BLD: 78 % (ref 43–80)
NEUTS SEG NFR BLD: 9.46 K/UL (ref 1.8–7.3)
PARTIAL THROMBOPLASTIN TIME: 28.7 SEC (ref 24.5–35.1)
PLATELET # BLD AUTO: 160 K/UL (ref 130–450)
PMV BLD AUTO: 9.7 FL (ref 7–12)
POTASSIUM SERPL-SCNC: 3.9 MMOL/L (ref 3.5–5)
PROT SERPL-MCNC: 8.4 G/DL (ref 6.4–8.3)
PROTHROMBIN TIME: 14 SEC (ref 9.3–12.4)
RBC # BLD AUTO: 4.95 M/UL (ref 3.8–5.8)
SODIUM SERPL-SCNC: 134 MMOL/L (ref 132–146)
WBC OTHER # BLD: 12.1 K/UL (ref 4.5–11.5)

## 2024-05-15 PROCEDURE — 6360000002 HC RX W HCPCS

## 2024-05-15 PROCEDURE — 86901 BLOOD TYPING SEROLOGIC RH(D): CPT

## 2024-05-15 PROCEDURE — 83690 ASSAY OF LIPASE: CPT

## 2024-05-15 PROCEDURE — 80053 COMPREHEN METABOLIC PANEL: CPT

## 2024-05-15 PROCEDURE — 83605 ASSAY OF LACTIC ACID: CPT

## 2024-05-15 PROCEDURE — 2580000003 HC RX 258

## 2024-05-15 PROCEDURE — 99223 1ST HOSP IP/OBS HIGH 75: CPT | Performed by: FAMILY MEDICINE

## 2024-05-15 PROCEDURE — 36415 COLL VENOUS BLD VENIPUNCTURE: CPT

## 2024-05-15 PROCEDURE — 6370000000 HC RX 637 (ALT 250 FOR IP)

## 2024-05-15 PROCEDURE — 85025 COMPLETE CBC W/AUTO DIFF WBC: CPT

## 2024-05-15 PROCEDURE — 85730 THROMBOPLASTIN TIME PARTIAL: CPT

## 2024-05-15 PROCEDURE — 99285 EMERGENCY DEPT VISIT HI MDM: CPT

## 2024-05-15 PROCEDURE — 86403 PARTICLE AGGLUT ANTBDY SCRN: CPT

## 2024-05-15 PROCEDURE — 85610 PROTHROMBIN TIME: CPT

## 2024-05-15 PROCEDURE — 86850 RBC ANTIBODY SCREEN: CPT

## 2024-05-15 PROCEDURE — 71046 X-RAY EXAM CHEST 2 VIEWS: CPT

## 2024-05-15 PROCEDURE — 87040 BLOOD CULTURE FOR BACTERIA: CPT

## 2024-05-15 PROCEDURE — 1200000000 HC SEMI PRIVATE

## 2024-05-15 PROCEDURE — 87205 SMEAR GRAM STAIN: CPT

## 2024-05-15 PROCEDURE — 87070 CULTURE OTHR SPECIMN AEROBIC: CPT

## 2024-05-15 PROCEDURE — 82962 GLUCOSE BLOOD TEST: CPT

## 2024-05-15 PROCEDURE — 93005 ELECTROCARDIOGRAM TRACING: CPT

## 2024-05-15 PROCEDURE — 86900 BLOOD TYPING SEROLOGIC ABO: CPT

## 2024-05-15 PROCEDURE — 2580000003 HC RX 258: Performed by: FAMILY MEDICINE

## 2024-05-15 PROCEDURE — 96374 THER/PROPH/DIAG INJ IV PUSH: CPT

## 2024-05-15 PROCEDURE — 73630 X-RAY EXAM OF FOOT: CPT

## 2024-05-15 RX ORDER — METOPROLOL SUCCINATE 25 MG/1
25 TABLET, EXTENDED RELEASE ORAL DAILY
Status: DISCONTINUED | OUTPATIENT
Start: 2024-05-16 | End: 2024-05-21 | Stop reason: HOSPADM

## 2024-05-15 RX ORDER — OXYCODONE AND ACETAMINOPHEN 7.5; 325 MG/1; MG/1
1 TABLET ORAL EVERY 8 HOURS PRN
Status: DISCONTINUED | OUTPATIENT
Start: 2024-05-15 | End: 2024-05-15 | Stop reason: CLARIF

## 2024-05-15 RX ORDER — ROSUVASTATIN CALCIUM 20 MG/1
40 TABLET, COATED ORAL NIGHTLY
Status: DISCONTINUED | OUTPATIENT
Start: 2024-05-15 | End: 2024-05-21 | Stop reason: HOSPADM

## 2024-05-15 RX ORDER — GLUCAGON 1 MG/ML
1 KIT INJECTION PRN
Status: DISCONTINUED | OUTPATIENT
Start: 2024-05-15 | End: 2024-05-21 | Stop reason: HOSPADM

## 2024-05-15 RX ORDER — OXYCODONE HYDROCHLORIDE 5 MG/1
2.5 TABLET ORAL EVERY 8 HOURS PRN
Status: DISCONTINUED | OUTPATIENT
Start: 2024-05-15 | End: 2024-05-21 | Stop reason: HOSPADM

## 2024-05-15 RX ORDER — INSULIN GLARGINE 100 [IU]/ML
30 INJECTION, SOLUTION SUBCUTANEOUS 2 TIMES DAILY
Status: DISCONTINUED | OUTPATIENT
Start: 2024-05-15 | End: 2024-05-21 | Stop reason: HOSPADM

## 2024-05-15 RX ORDER — LANOLIN ALCOHOL/MO/W.PET/CERES
6 CREAM (GRAM) TOPICAL NIGHTLY
Status: DISCONTINUED | OUTPATIENT
Start: 2024-05-15 | End: 2024-05-21 | Stop reason: HOSPADM

## 2024-05-15 RX ORDER — DEXTROSE MONOHYDRATE 100 MG/ML
INJECTION, SOLUTION INTRAVENOUS CONTINUOUS PRN
Status: DISCONTINUED | OUTPATIENT
Start: 2024-05-15 | End: 2024-05-21 | Stop reason: HOSPADM

## 2024-05-15 RX ORDER — INSULIN LISPRO 100 [IU]/ML
0-4 INJECTION, SOLUTION INTRAVENOUS; SUBCUTANEOUS NIGHTLY
Status: DISCONTINUED | OUTPATIENT
Start: 2024-05-15 | End: 2024-05-21 | Stop reason: HOSPADM

## 2024-05-15 RX ORDER — ACETAMINOPHEN 325 MG/1
650 TABLET ORAL EVERY 6 HOURS PRN
Status: DISCONTINUED | OUTPATIENT
Start: 2024-05-15 | End: 2024-05-21 | Stop reason: HOSPADM

## 2024-05-15 RX ORDER — GABAPENTIN 300 MG/1
300 CAPSULE ORAL 3 TIMES DAILY
Status: DISCONTINUED | OUTPATIENT
Start: 2024-05-15 | End: 2024-05-21 | Stop reason: HOSPADM

## 2024-05-15 RX ORDER — SODIUM CHLORIDE, SODIUM LACTATE, POTASSIUM CHLORIDE, CALCIUM CHLORIDE 600; 310; 30; 20 MG/100ML; MG/100ML; MG/100ML; MG/100ML
INJECTION, SOLUTION INTRAVENOUS CONTINUOUS
Status: ACTIVE | OUTPATIENT
Start: 2024-05-15 | End: 2024-05-16

## 2024-05-15 RX ORDER — ASCORBIC ACID 500 MG
1000 TABLET ORAL DAILY
Status: DISCONTINUED | OUTPATIENT
Start: 2024-05-15 | End: 2024-05-21 | Stop reason: HOSPADM

## 2024-05-15 RX ORDER — OXYCODONE HYDROCHLORIDE AND ACETAMINOPHEN 5; 325 MG/1; MG/1
1 TABLET ORAL EVERY 8 HOURS PRN
Status: DISCONTINUED | OUTPATIENT
Start: 2024-05-15 | End: 2024-05-21 | Stop reason: HOSPADM

## 2024-05-15 RX ORDER — SODIUM CHLORIDE 0.9 % (FLUSH) 0.9 %
5-40 SYRINGE (ML) INJECTION EVERY 12 HOURS SCHEDULED
Status: DISCONTINUED | OUTPATIENT
Start: 2024-05-15 | End: 2024-05-21 | Stop reason: HOSPADM

## 2024-05-15 RX ORDER — INSULIN LISPRO 100 [IU]/ML
0-4 INJECTION, SOLUTION INTRAVENOUS; SUBCUTANEOUS
Status: DISCONTINUED | OUTPATIENT
Start: 2024-05-16 | End: 2024-05-21 | Stop reason: HOSPADM

## 2024-05-15 RX ORDER — SODIUM CHLORIDE 0.9 % (FLUSH) 0.9 %
5-40 SYRINGE (ML) INJECTION PRN
Status: DISCONTINUED | OUTPATIENT
Start: 2024-05-15 | End: 2024-05-21 | Stop reason: HOSPADM

## 2024-05-15 RX ORDER — ACETAMINOPHEN 325 MG/1
650 TABLET ORAL ONCE
Status: COMPLETED | OUTPATIENT
Start: 2024-05-15 | End: 2024-05-15

## 2024-05-15 RX ORDER — ACETAMINOPHEN 650 MG/1
650 SUPPOSITORY RECTAL EVERY 6 HOURS PRN
Status: DISCONTINUED | OUTPATIENT
Start: 2024-05-15 | End: 2024-05-21 | Stop reason: HOSPADM

## 2024-05-15 RX ORDER — SODIUM CHLORIDE 9 MG/ML
INJECTION, SOLUTION INTRAVENOUS PRN
Status: DISCONTINUED | OUTPATIENT
Start: 2024-05-15 | End: 2024-05-21 | Stop reason: HOSPADM

## 2024-05-15 RX ORDER — BUPROPION HYDROCHLORIDE 150 MG/1
150 TABLET ORAL EVERY MORNING
Status: DISCONTINUED | OUTPATIENT
Start: 2024-05-16 | End: 2024-05-21 | Stop reason: HOSPADM

## 2024-05-15 RX ORDER — BUSPIRONE HYDROCHLORIDE 7.5 MG/1
15 TABLET ORAL 2 TIMES DAILY
Status: DISCONTINUED | OUTPATIENT
Start: 2024-05-15 | End: 2024-05-21 | Stop reason: HOSPADM

## 2024-05-15 RX ADMIN — VANCOMYCIN HYDROCHLORIDE 2500 MG: 10 INJECTION, POWDER, LYOPHILIZED, FOR SOLUTION INTRAVENOUS at 23:29

## 2024-05-15 RX ADMIN — CEFEPIME 2000 MG: 2 INJECTION, POWDER, FOR SOLUTION INTRAVENOUS at 19:10

## 2024-05-15 RX ADMIN — SODIUM CHLORIDE, POTASSIUM CHLORIDE, SODIUM LACTATE AND CALCIUM CHLORIDE: 600; 310; 30; 20 INJECTION, SOLUTION INTRAVENOUS at 23:17

## 2024-05-15 RX ADMIN — ACETAMINOPHEN 650 MG: 325 TABLET ORAL at 19:08

## 2024-05-15 RX ADMIN — SODIUM CHLORIDE, PRESERVATIVE FREE 10 ML: 5 INJECTION INTRAVENOUS at 23:10

## 2024-05-15 ASSESSMENT — PAIN SCALES - GENERAL: PAINLEVEL_OUTOF10: 7

## 2024-05-15 ASSESSMENT — PAIN - FUNCTIONAL ASSESSMENT: PAIN_FUNCTIONAL_ASSESSMENT: 0-10

## 2024-05-15 ASSESSMENT — LIFESTYLE VARIABLES
HOW MANY STANDARD DRINKS CONTAINING ALCOHOL DO YOU HAVE ON A TYPICAL DAY: PATIENT DOES NOT DRINK
HOW OFTEN DO YOU HAVE A DRINK CONTAINING ALCOHOL: NEVER

## 2024-05-15 NOTE — CONSULTS
Department of Podiatry   Consult Note        Reason for Consult: Right foot wound    CHIEF COMPLAINT: Infected foot wound    HISTORY OF PRESENT ILLNESS:    Dejan Patino is a 53 y.o. male with significant past medical history of atrial fibrillation, CAD, CKD, hypertension, PVD, and diabetes mellitus.  He is at the hospital for an infected right foot wound.  His home health nurse who was changing his dressing today noticed it and sent him to his podiatrist who sent him to the hospital.  He noticed that there was some drainage on the bandage and it \"smelled weird\".  He has been changing the dressing and doing everything his podiatrist told him to, so he is bewildered as to why it is infected.  He denies any pain or discomfort.  He reports fever and chills lasting the past few days.  Patient denies any N/V/D/SOB/CP and has no other pedal complaints at this time.     Debridement of all nonviable tissue, possible bone biopsy and possible fourth digit amputation scheduled for 5/16/2024    Patient to be n.p.o. at 0000 on 5/16/2024 for procedure on 5/16/2024    Past Medical History:        Diagnosis Date    Ankle ulcer, left, limited to breakdown of skin ([facility]) 9/18/2023    Anxiety     Atrial fibrillation, unspecified type ([facility])     CAD (coronary artery disease)     Cardiomyopathy ([facility])     CKD (chronic kidney disease)     Decreased dorsalis pedis pulse 9/18/2023    Depression     HTN (hypertension)     Hyperlipidemia     LBBB (left bundle branch block)     PVD (peripheral vascular disease) ([facility]) 9/18/2023    Type 2 diabetes mellitus without complication ([facility])     Varicose veins of bilateral lower extremities with other complications 9/18/2023    Mild, bilateral below the knee noted       Past Surgical History:        Procedure Laterality Date    CARDIAC SURGERY      CORONARY ARTERY BYPASS GRAFT  12/2020    EYE LID SURGERY      FOOT SURGERY Bilateral     FRACTURE SURGERY      OTHER SURGICAL HISTORY      atrial clip-has card

## 2024-05-15 NOTE — ED PROVIDER NOTES
IntraVENous Stopped 5/15/24 2005)   acetaminophen (TYLENOL) tablet 650 mg (650 mg Oral Given 5/15/24 1908)       CONSULTS: (Who and What was discussed)  IP CONSULT TO HOSPITALIST  IP CONSULT TO PODIATRY  IP CONSULT TO PHARMACY  IP CONSULT TO PODIATRY  Discussion with Other Profesionals : Consultant Dr. Marr, podiatry, recommended admission along with wound cultures and antibiotics.  Clinical pharmacist recommended vancomycin and cefepime.  Dr. Perera, [facility], accepted admission     Social Determinants : None    Records Reviewed : None    CC/HPI Summary, DDx, ED Course, and Reassessment: Dejan Patino is a 53 y.o. male who presents to the ED for admission.  Patient states that he was at Dr. Marr's office today and was instructed to go to the ED for admission for surgery tomorrow.  Patient states that he has a wound to the bottom of the right foot and has been seeing Dr. Marr weekly for the past 2 months.  Patient is diabetic.  Patient also states that he has been experiencing fever, chills, and bodyaches since Friday.  Patient denies any chest pain or shortness of breath.  Patient has no other complaints at this time.  CBC with differential, CMP with reflex magnesium, APTT, pro time INR, lactate, blood cultures, type and screen, urinalysis with microscopic, chest x-ray, EKG, and x-ray of the right foot obtained.  Patient received Tylenol.  All results pending at time of admission.    Admitted by Dr. Perera    I am the Primary Clinician of Record.    FINAL IMPRESSION      1. Cellulitis of right lower extremity    2. Diabetic ulcer of right foot associated with type 1 diabetes mellitus, limited to breakdown of skin, unspecified part of foot (HCC)          DISPOSITION/PLAN     DISPOSITION Admitted 05/15/2024 07:24:29 PM      PATIENT REFERRED TO:  No follow-up provider specified.    DISCHARGE MEDICATIONS:  New Prescriptions    No medications on file       DISCONTINUED MEDICATIONS:  Discontinued Medications

## 2024-05-15 NOTE — H&P
[facility]   History and Physical      CHIEF COMPLAINT:  right foot diabetic ulcer    History of Present Illness:  53 y.o. male with a history of Type 2 DM, HTN, HLD, CKD, CAD, AFib presents with right foot diabetic ulcer.  States 5/10 he developed chills, didn't feel well.  Had his right foot dressing changed by home health on 5/12 and nurse did not notice any abnormalities, however today his leg was warm with erythema and home health nurse was concerned about foul smelling drainage.  He was sent in by podiatrist for IV antibiotics, planned surgery 5/16.  Workup in ED significant for glucose 153, WBC 12.1.  Blood and wound cultures obtained in ED.  Started on vanc/cefepime.  Xray right foot and chest done with no acute findings.    Informant(s) for H&P: patient, chart    REVIEW OF SYSTEMS:  no cp, sob, n/v, ha, vision/hearing changes, wt changes, hot/cold flashes, diarrhea, constipation, dysuria/hematuria unless noted in HPI. Complete ROS performed with the patient and is otherwise negative.      PMH:  Past Medical History:   Diagnosis Date    Ankle ulcer, left, limited to breakdown of skin (HCC) 9/18/2023    Anxiety     Atrial fibrillation, unspecified type ([facility])     CAD (coronary artery disease)     Cardiomyopathy ([facility])     CKD (chronic kidney disease)     Decreased dorsalis pedis pulse 9/18/2023    Depression     HTN (hypertension)     Hyperlipidemia     LBBB (left bundle branch block)     PVD (peripheral vascular disease) ([facility]) 9/18/2023    Type 2 diabetes mellitus without complication ([facility])     Varicose veins of bilateral lower extremities with other complications 9/18/2023    Mild, bilateral below the knee noted       Surgical History:  Past Surgical History:   Procedure Laterality Date    CARDIAC SURGERY      CORONARY ARTERY BYPASS GRAFT  12/2020    EYE LID SURGERY      FOOT SURGERY Bilateral     FRACTURE SURGERY      OTHER SURGICAL HISTORY      atrial clip-has card

## 2024-05-16 ENCOUNTER — APPOINTMENT (OUTPATIENT)
Dept: CT IMAGING | Age: 54
DRG: 617 | End: 2024-05-16
Payer: MEDICARE

## 2024-05-16 ENCOUNTER — ANESTHESIA (OUTPATIENT)
Dept: OPERATING ROOM | Age: 54
DRG: 617 | End: 2024-05-16
Payer: MEDICARE

## 2024-05-16 ENCOUNTER — ANESTHESIA EVENT (OUTPATIENT)
Dept: OPERATING ROOM | Age: 54
DRG: 617 | End: 2024-05-16
Payer: MEDICARE

## 2024-05-16 LAB
ANION GAP SERPL CALCULATED.3IONS-SCNC: 18 MMOL/L (ref 7–16)
BASOPHILS # BLD: 0.04 K/UL (ref 0–0.2)
BASOPHILS NFR BLD: 0 % (ref 0–2)
BUN SERPL-MCNC: 16 MG/DL (ref 6–20)
CALCIUM SERPL-MCNC: 8.9 MG/DL (ref 8.6–10.2)
CHLORIDE SERPL-SCNC: 98 MMOL/L (ref 98–107)
CHOLEST SERPL-MCNC: 78 MG/DL
CO2 SERPL-SCNC: 19 MMOL/L (ref 22–29)
CREAT SERPL-MCNC: 0.7 MG/DL (ref 0.7–1.2)
DATE LAST DOSE: NORMAL
EKG ATRIAL RATE: 84 BPM
EKG P AXIS: 1 DEGREES
EKG P-R INTERVAL: 188 MS
EKG Q-T INTERVAL: 430 MS
EKG QRS DURATION: 172 MS
EKG QTC CALCULATION (BAZETT): 508 MS
EKG R AXIS: 7 DEGREES
EKG T AXIS: 137 DEGREES
EKG VENTRICULAR RATE: 84 BPM
EOSINOPHIL # BLD: 0.04 K/UL (ref 0.05–0.5)
EOSINOPHILS RELATIVE PERCENT: 0 % (ref 0–6)
ERYTHROCYTE [DISTWIDTH] IN BLOOD BY AUTOMATED COUNT: 13 % (ref 11.5–15)
GFR, ESTIMATED: >90 ML/MIN/1.73M2
GLUCOSE SERPL-MCNC: 78 MG/DL (ref 74–99)
HBA1C MFR BLD: 8 % (ref 4–5.6)
HCT VFR BLD AUTO: 38.3 % (ref 37–54)
HDLC SERPL-MCNC: 36 MG/DL
HGB BLD-MCNC: 12.8 G/DL (ref 12.5–16.5)
IMM GRANULOCYTES # BLD AUTO: 0.04 K/UL (ref 0–0.58)
IMM GRANULOCYTES NFR BLD: 0 % (ref 0–5)
INR PPP: 1.4
LDLC SERPL CALC-MCNC: 27 MG/DL
LYMPHOCYTES NFR BLD: 0.95 K/UL (ref 1.5–4)
LYMPHOCYTES RELATIVE PERCENT: 10 % (ref 20–42)
MCH RBC QN AUTO: 29.1 PG (ref 26–35)
MCHC RBC AUTO-ENTMCNC: 33.4 G/DL (ref 32–34.5)
MCV RBC AUTO: 87 FL (ref 80–99.9)
MONOCYTES NFR BLD: 0.98 K/UL (ref 0.1–0.95)
MONOCYTES NFR BLD: 10 % (ref 2–12)
NEUTROPHILS NFR BLD: 78 % (ref 43–80)
NEUTS SEG NFR BLD: 7.36 K/UL (ref 1.8–7.3)
PLATELET # BLD AUTO: 148 K/UL (ref 130–450)
PMV BLD AUTO: 10 FL (ref 7–12)
POTASSIUM SERPL-SCNC: 3.6 MMOL/L (ref 3.5–5)
PROTHROMBIN TIME: 15.7 SEC (ref 9.3–12.4)
RBC # BLD AUTO: 4.4 M/UL (ref 3.8–5.8)
SODIUM SERPL-SCNC: 135 MMOL/L (ref 132–146)
TME LAST DOSE: NORMAL H
TRIGL SERPL-MCNC: 77 MG/DL
TSH SERPL DL<=0.05 MIU/L-ACNC: 1.12 UIU/ML (ref 0.27–4.2)
VANCOMYCIN DOSE: NORMAL MG
VANCOMYCIN SERPL-MCNC: 12.1 UG/ML (ref 5–40)
VLDLC SERPL CALC-MCNC: 15 MG/DL
WBC OTHER # BLD: 9.4 K/UL (ref 4.5–11.5)

## 2024-05-16 PROCEDURE — 2580000003 HC RX 258: Performed by: FAMILY MEDICINE

## 2024-05-16 PROCEDURE — 86403 PARTICLE AGGLUT ANTBDY SCRN: CPT

## 2024-05-16 PROCEDURE — 87075 CULTR BACTERIA EXCEPT BLOOD: CPT

## 2024-05-16 PROCEDURE — 6360000002 HC RX W HCPCS

## 2024-05-16 PROCEDURE — 80048 BASIC METABOLIC PNL TOTAL CA: CPT

## 2024-05-16 PROCEDURE — 3700000001 HC ADD 15 MINUTES (ANESTHESIA): Performed by: PODIATRIST

## 2024-05-16 PROCEDURE — 6360000002 HC RX W HCPCS: Performed by: ANESTHESIOLOGIST ASSISTANT

## 2024-05-16 PROCEDURE — 87116 MYCOBACTERIA CULTURE: CPT

## 2024-05-16 PROCEDURE — 6360000002 HC RX W HCPCS: Performed by: FAMILY MEDICINE

## 2024-05-16 PROCEDURE — 6370000000 HC RX 637 (ALT 250 FOR IP): Performed by: FAMILY MEDICINE

## 2024-05-16 PROCEDURE — 87206 SMEAR FLUORESCENT/ACID STAI: CPT

## 2024-05-16 PROCEDURE — 87205 SMEAR GRAM STAIN: CPT

## 2024-05-16 PROCEDURE — 88311 DECALCIFY TISSUE: CPT

## 2024-05-16 PROCEDURE — 1200000000 HC SEMI PRIVATE

## 2024-05-16 PROCEDURE — 80202 ASSAY OF VANCOMYCIN: CPT

## 2024-05-16 PROCEDURE — 84443 ASSAY THYROID STIM HORMONE: CPT

## 2024-05-16 PROCEDURE — 6370000000 HC RX 637 (ALT 250 FOR IP)

## 2024-05-16 PROCEDURE — 85025 COMPLETE CBC W/AUTO DIFF WBC: CPT

## 2024-05-16 PROCEDURE — 2580000003 HC RX 258: Performed by: INTERNAL MEDICINE

## 2024-05-16 PROCEDURE — 87176 TISSUE HOMOGENIZATION CULTR: CPT

## 2024-05-16 PROCEDURE — 88305 TISSUE EXAM BY PATHOLOGIST: CPT

## 2024-05-16 PROCEDURE — 87070 CULTURE OTHR SPECIMN AEROBIC: CPT

## 2024-05-16 PROCEDURE — 2580000003 HC RX 258: Performed by: ANESTHESIOLOGIST ASSISTANT

## 2024-05-16 PROCEDURE — 2709999900 HC NON-CHARGEABLE SUPPLY: Performed by: PODIATRIST

## 2024-05-16 PROCEDURE — 7100000001 HC PACU RECOVERY - ADDTL 15 MIN: Performed by: PODIATRIST

## 2024-05-16 PROCEDURE — 93010 ELECTROCARDIOGRAM REPORT: CPT | Performed by: INTERNAL MEDICINE

## 2024-05-16 PROCEDURE — 99232 SBSQ HOSP IP/OBS MODERATE 35: CPT | Performed by: STUDENT IN AN ORGANIZED HEALTH CARE EDUCATION/TRAINING PROGRAM

## 2024-05-16 PROCEDURE — 6360000002 HC RX W HCPCS: Performed by: INTERNAL MEDICINE

## 2024-05-16 PROCEDURE — 2580000003 HC RX 258

## 2024-05-16 PROCEDURE — 80061 LIPID PANEL: CPT

## 2024-05-16 PROCEDURE — 3600000002 HC SURGERY LEVEL 2 BASE: Performed by: PODIATRIST

## 2024-05-16 PROCEDURE — 87185 SC STD ENZYME DETCJ PER NZM: CPT

## 2024-05-16 PROCEDURE — 3600000012 HC SURGERY LEVEL 2 ADDTL 15MIN: Performed by: PODIATRIST

## 2024-05-16 PROCEDURE — 87015 SPECIMEN INFECT AGNT CONCNTJ: CPT

## 2024-05-16 PROCEDURE — 83036 HEMOGLOBIN GLYCOSYLATED A1C: CPT

## 2024-05-16 PROCEDURE — 3700000000 HC ANESTHESIA ATTENDED CARE: Performed by: PODIATRIST

## 2024-05-16 PROCEDURE — 85610 PROTHROMBIN TIME: CPT

## 2024-05-16 PROCEDURE — 7100000000 HC PACU RECOVERY - FIRST 15 MIN: Performed by: PODIATRIST

## 2024-05-16 PROCEDURE — 87102 FUNGUS ISOLATION CULTURE: CPT

## 2024-05-16 PROCEDURE — 73700 CT LOWER EXTREMITY W/O DYE: CPT

## 2024-05-16 PROCEDURE — 6360000002 HC RX W HCPCS: Performed by: PODIATRIST

## 2024-05-16 PROCEDURE — 36415 COLL VENOUS BLD VENIPUNCTURE: CPT

## 2024-05-16 RX ORDER — SODIUM CHLORIDE 0.9 % (FLUSH) 0.9 %
5-40 SYRINGE (ML) INJECTION EVERY 12 HOURS SCHEDULED
Status: DISCONTINUED | OUTPATIENT
Start: 2024-05-16 | End: 2024-05-21 | Stop reason: HOSPADM

## 2024-05-16 RX ORDER — MIDAZOLAM HYDROCHLORIDE 1 MG/ML
INJECTION INTRAMUSCULAR; INTRAVENOUS PRN
Status: DISCONTINUED | OUTPATIENT
Start: 2024-05-16 | End: 2024-05-16 | Stop reason: SDUPTHER

## 2024-05-16 RX ORDER — HYDROMORPHONE HYDROCHLORIDE 1 MG/ML
0.5 INJECTION, SOLUTION INTRAMUSCULAR; INTRAVENOUS; SUBCUTANEOUS EVERY 5 MIN PRN
Status: DISCONTINUED | OUTPATIENT
Start: 2024-05-16 | End: 2024-05-16 | Stop reason: HOSPADM

## 2024-05-16 RX ORDER — ONDANSETRON 2 MG/ML
4 INJECTION INTRAMUSCULAR; INTRAVENOUS
Status: DISCONTINUED | OUTPATIENT
Start: 2024-05-16 | End: 2024-05-16 | Stop reason: HOSPADM

## 2024-05-16 RX ORDER — SODIUM CHLORIDE 9 MG/ML
INJECTION, SOLUTION INTRAVENOUS PRN
Status: DISCONTINUED | OUTPATIENT
Start: 2024-05-16 | End: 2024-05-16 | Stop reason: HOSPADM

## 2024-05-16 RX ORDER — HYDROMORPHONE HYDROCHLORIDE 1 MG/ML
0.25 INJECTION, SOLUTION INTRAMUSCULAR; INTRAVENOUS; SUBCUTANEOUS EVERY 5 MIN PRN
Status: DISCONTINUED | OUTPATIENT
Start: 2024-05-16 | End: 2024-05-16 | Stop reason: HOSPADM

## 2024-05-16 RX ORDER — SODIUM CHLORIDE 9 MG/ML
INJECTION, SOLUTION INTRAVENOUS PRN
Status: DISCONTINUED | OUTPATIENT
Start: 2024-05-16 | End: 2024-05-21 | Stop reason: HOSPADM

## 2024-05-16 RX ORDER — LIDOCAINE HYDROCHLORIDE 20 MG/ML
INJECTION, SOLUTION INTRAVENOUS PRN
Status: DISCONTINUED | OUTPATIENT
Start: 2024-05-16 | End: 2024-05-16 | Stop reason: SDUPTHER

## 2024-05-16 RX ORDER — ACETAMINOPHEN 650 MG
TABLET, EXTENDED RELEASE ORAL PRN
Status: DISCONTINUED | OUTPATIENT
Start: 2024-05-16 | End: 2024-05-16 | Stop reason: HOSPADM

## 2024-05-16 RX ORDER — HYDRALAZINE HYDROCHLORIDE 20 MG/ML
10 INJECTION INTRAMUSCULAR; INTRAVENOUS EVERY 4 HOURS PRN
Status: DISCONTINUED | OUTPATIENT
Start: 2024-05-16 | End: 2024-05-21 | Stop reason: HOSPADM

## 2024-05-16 RX ORDER — SODIUM CHLORIDE 0.9 % (FLUSH) 0.9 %
5-40 SYRINGE (ML) INJECTION EVERY 12 HOURS SCHEDULED
Status: DISCONTINUED | OUTPATIENT
Start: 2024-05-16 | End: 2024-05-16 | Stop reason: HOSPADM

## 2024-05-16 RX ORDER — SODIUM CHLORIDE 0.9 % (FLUSH) 0.9 %
5-40 SYRINGE (ML) INJECTION PRN
Status: DISCONTINUED | OUTPATIENT
Start: 2024-05-16 | End: 2024-05-21 | Stop reason: HOSPADM

## 2024-05-16 RX ORDER — FENTANYL CITRATE 50 UG/ML
INJECTION, SOLUTION INTRAMUSCULAR; INTRAVENOUS PRN
Status: DISCONTINUED | OUTPATIENT
Start: 2024-05-16 | End: 2024-05-16 | Stop reason: SDUPTHER

## 2024-05-16 RX ORDER — SODIUM CHLORIDE 9 MG/ML
INJECTION, SOLUTION INTRAVENOUS CONTINUOUS PRN
Status: DISCONTINUED | OUTPATIENT
Start: 2024-05-16 | End: 2024-05-16 | Stop reason: SDUPTHER

## 2024-05-16 RX ORDER — BUPIVACAINE HYDROCHLORIDE 5 MG/ML
INJECTION, SOLUTION EPIDURAL; INTRACAUDAL PRN
Status: DISCONTINUED | OUTPATIENT
Start: 2024-05-16 | End: 2024-05-16 | Stop reason: HOSPADM

## 2024-05-16 RX ORDER — SODIUM CHLORIDE 0.9 % (FLUSH) 0.9 %
5-40 SYRINGE (ML) INJECTION PRN
Status: DISCONTINUED | OUTPATIENT
Start: 2024-05-16 | End: 2024-05-16 | Stop reason: HOSPADM

## 2024-05-16 RX ORDER — PROPOFOL 10 MG/ML
INJECTION, EMULSION INTRAVENOUS PRN
Status: DISCONTINUED | OUTPATIENT
Start: 2024-05-16 | End: 2024-05-16 | Stop reason: SDUPTHER

## 2024-05-16 RX ORDER — NALOXONE HYDROCHLORIDE 0.4 MG/ML
INJECTION, SOLUTION INTRAMUSCULAR; INTRAVENOUS; SUBCUTANEOUS PRN
Status: DISCONTINUED | OUTPATIENT
Start: 2024-05-16 | End: 2024-05-16 | Stop reason: HOSPADM

## 2024-05-16 RX ADMIN — MEROPENEM 1000 MG: 1 INJECTION, POWDER, FOR SOLUTION INTRAVENOUS at 15:29

## 2024-05-16 RX ADMIN — MIDAZOLAM 2 MG: 1 INJECTION INTRAMUSCULAR; INTRAVENOUS at 20:44

## 2024-05-16 RX ADMIN — BUSPIRONE HYDROCHLORIDE 15 MG: 7.5 TABLET ORAL at 22:20

## 2024-05-16 RX ADMIN — SODIUM CHLORIDE: 9 INJECTION, SOLUTION INTRAVENOUS at 20:42

## 2024-05-16 RX ADMIN — SODIUM CHLORIDE, PRESERVATIVE FREE 10 ML: 5 INJECTION INTRAVENOUS at 22:21

## 2024-05-16 RX ADMIN — Medication 1500 MG: at 17:11

## 2024-05-16 RX ADMIN — SERTRALINE 50 MG: 50 TABLET, FILM COATED ORAL at 08:16

## 2024-05-16 RX ADMIN — LIDOCAINE HYDROCHLORIDE 50 MG: 20 INJECTION, SOLUTION INTRAVENOUS at 20:46

## 2024-05-16 RX ADMIN — ROSUVASTATIN 40 MG: 20 TABLET, FILM COATED ORAL at 22:21

## 2024-05-16 RX ADMIN — PROPOFOL 100 MCG/KG/MIN: 10 INJECTION, EMULSION INTRAVENOUS at 20:47

## 2024-05-16 RX ADMIN — BUSPIRONE HYDROCHLORIDE 15 MG: 7.5 TABLET ORAL at 08:16

## 2024-05-16 RX ADMIN — CEFEPIME 2000 MG: 2 INJECTION, POWDER, FOR SOLUTION INTRAVENOUS at 03:10

## 2024-05-16 RX ADMIN — Medication 6 MG: at 22:21

## 2024-05-16 RX ADMIN — FENTANYL CITRATE 50 MCG: 50 INJECTION, SOLUTION INTRAMUSCULAR; INTRAVENOUS at 21:05

## 2024-05-16 RX ADMIN — GABAPENTIN 300 MG: 300 CAPSULE ORAL at 22:21

## 2024-05-16 RX ADMIN — PROPOFOL 60 MG: 10 INJECTION, EMULSION INTRAVENOUS at 20:46

## 2024-05-16 RX ADMIN — SODIUM CHLORIDE, PRESERVATIVE FREE 10 ML: 5 INJECTION INTRAVENOUS at 08:17

## 2024-05-16 RX ADMIN — Medication 1500 MG: at 10:42

## 2024-05-16 RX ADMIN — METOPROLOL SUCCINATE 25 MG: 25 TABLET, EXTENDED RELEASE ORAL at 08:16

## 2024-05-16 RX ADMIN — MEROPENEM 1000 MG: 1 INJECTION, POWDER, FOR SOLUTION INTRAVENOUS at 22:32

## 2024-05-16 RX ADMIN — GABAPENTIN 300 MG: 300 CAPSULE ORAL at 12:12

## 2024-05-16 RX ADMIN — BUPROPION HYDROCHLORIDE 150 MG: 150 TABLET, EXTENDED RELEASE ORAL at 08:17

## 2024-05-16 RX ADMIN — Medication 1000 MG: at 08:16

## 2024-05-16 RX ADMIN — CHOLECALCIFEROL TAB 125 MCG (5000 UNIT) 5000 UNITS: 125 TAB at 08:17

## 2024-05-16 RX ADMIN — CEFEPIME 2000 MG: 2 INJECTION, POWDER, FOR SOLUTION INTRAVENOUS at 12:24

## 2024-05-16 RX ADMIN — GABAPENTIN 300 MG: 300 CAPSULE ORAL at 08:16

## 2024-05-16 RX ADMIN — FENTANYL CITRATE 50 MCG: 50 INJECTION, SOLUTION INTRAMUSCULAR; INTRAVENOUS at 20:44

## 2024-05-16 ASSESSMENT — PAIN SCALES - GENERAL: PAINLEVEL_OUTOF10: 0

## 2024-05-16 NOTE — HOME CARE
Current with [facility] for SN. Will need VIRGIL orders prior to discharge.  Layla Ríos LPN, [facility]

## 2024-05-16 NOTE — PLAN OF CARE
Problem: Safety - Adult  Goal: Free from fall injury  5/16/2024 0500 by Mary Zaman RN  Outcome: Progressing  5/16/2024 0346 by Mary Zaman RN  Outcome: Progressing     Problem: ABCDS Injury Assessment  Goal: Absence of physical injury  5/16/2024 0500 by Mary Zaman RN  Outcome: Progressing  5/16/2024 0346 by Mary Zaman RN  Outcome: Progressing

## 2024-05-16 NOTE — ANESTHESIA PRE PROCEDURE
Department of Anesthesiology  Preprocedure Note       Name:  Dejan Patino   Age:  53 y.o.  :  1970                                          MRN:  [id number]         Date:  2024      Surgeon: Surgeon(s):  Romeo Marr DPM    Procedure: Procedure(s):  RIGHT FOOT DEBRIDEMENT OF ALL NONVIABLE TISSUE, POSSIBLE BONE BIOPSY, POSSIBLE FOURTH TOE AMPUTATION    Medications prior to admission:   Prior to Admission medications    Medication Sig Start Date End Date Taking? Authorizing Provider   oxyCODONE-acetaminophen (PERCOCET) 7.5-325 MG per tablet Take 1 tablet by mouth 2 times daily as needed for Pain for up to 30 days. 24  Roger Smith MD   buPROPion (WELLBUTRIN XL) 150 MG extended release tablet Take 1 tablet by mouth every morning 3/28/24   Roger Smith MD   busPIRone (BUSPAR) 15 MG tablet TAKE 1 TABLET BY MOUTH TWICE DAILY 3/28/24   Roger Smith MD   empagliflozin (JARDIANCE) 25 MG tablet Indications: This medication can cause dehydration if blood sugars are not controlled. TAKE 1 TABLET BY MOUTH ONCE DAILY FOR 90 DAYS 3/28/24   Roger Smith MD   gabapentin (NEURONTIN) 300 MG capsule Take 1 capsule by mouth in the morning, at noon, and at bedtime for 90 days. 3/28/24 6/26/24  Roger Smith MD   rosuvastatin (CRESTOR) 40 MG tablet Take 1 tablet by mouth nightly 3/28/24   Roger Smith MD   sertraline (ZOLOFT) 50 MG tablet TAKE ONE TABLET BY MOUTH DAILY 3/28/24   Roegr Smith MD   metoprolol succinate (TOPROL XL) 25 MG extended release tablet TAKE 1 TABLET BY MOUTH ONCE DAILY FOR 90 DAYS 24   Roger Smith MD   vitamin D3 (CHOLECALCIFEROL) 125 MCG (5000 UT) TABS tablet Take 1 tablet by mouth daily 24   Roger Smith MD   Insulin Pen Needle (EASY TOUCH PEN NEEDLES) 32G X 5 MM MISC USE QID WITH INSULIN PENS 1/10/24   Roger Smith MD   Insulin Disposable Pump (OMNIPOD 5 G6 INTRO, GEN 5,) KIT 1 Device by Does not apply route once for 1 dose 24  tablet 650 mg  650 mg Oral Q6H PRN Morenita Perera H, DO        Or    acetaminophen (TYLENOL) suppository 650 mg  650 mg Rectal Q6H PRN Morenita Perera H, DO        melatonin tablet 6 mg  6 mg Oral Nightly Angie Pereraica H, DO        ascorbic acid (VITAMIN C) tablet 1,000 mg  1,000 mg Oral Daily Angie Pereraica H, DO   1,000 mg at 05/16/24 0816    buPROPion (WELLBUTRIN XL) extended release tablet 150 mg  150 mg Oral QAM Angie Pereraica H, DO   150 mg at 05/16/24 0817    busPIRone (BUSPAR) tablet 15 mg  15 mg Oral BID Angie Pereraica H, DO   15 mg at 05/16/24 0816    empagliflozin (JARDIANCE) tablet 25 mg  25 mg Oral Daily Angie Pereraica H, DO        gabapentin (NEURONTIN) capsule 300 mg  300 mg Oral TID Angie Pereraica H, DO   300 mg at 05/16/24 1212    insulin glargine (LANTUS) injection vial 30 Units  30 Units SubCUTAneous BID Morenita Perera H, DO        metoprolol succinate (TOPROL XL) extended release tablet 25 mg  25 mg Oral Daily Angie Pereraica H, DO   25 mg at 05/16/24 0816    rosuvastatin (CRESTOR) tablet 40 mg  40 mg Oral Nightly Angie Pereraica H, DO        sertraline (ZOLOFT) tablet 50 mg  50 mg Oral Daily Angie Pereraica H, DO   50 mg at 05/16/24 0816    vitamin D3 (CHOLECALCIFEROL) tablet 5,000 Units  5,000 Units Oral Daily Angie Pereraica H, DO   5,000 Units at 05/16/24 0817    oxyCODONE-acetaminophen (PERCOCET) 5-325 MG per tablet 1 tablet  1 tablet Oral Q8H PRN Morenita Perera H, DO        And    oxyCODONE (ROXICODONE) immediate release tablet 2.5 mg  2.5 mg Oral Q8H PRN Morenita Perera H, DO        insulin lispro (HUMALOG,ADMELOG) injection vial 0-4 Units  0-4 Units SubCUTAneous TID WC Morenita Perera H, DO        insulin lispro (HUMALOG,ADMELOG) injection vial 0-4 Units  0-4 Units SubCUTAneous Nightly Morenita Perera H, DO        glucose chewable tablet 16 g  4 tablet Oral PRN Morenita Perera, DO        dextrose bolus 10% 125 mL  125 mL IntraVENous PRN Morenita Perera, DO        Or

## 2024-05-16 NOTE — CARE COORDINATION
DANIKA transition of care.  Patient presented to [facility] ER secondary to a wound check.  Pt was sent in from Podiatry office.  Admitted inpatient with diabetic foot ulcer of right foot d/t DM.  Podiatry and ID on consult.  IV vancomycin q 8 and IV Merrem q 8.  NPO for surgery today with Podiatry.  Plan is for right foot debridement of all nonviable tissue possible bone biopsy, possible 4th toe amputation.  Met with patient at the bedside to discuss discharge planning.  Patient lives alone in a single story home.  He reports being independent with ADLs.  He is an active .  Owns a FWW and knee scooter.  Active with [facility].  Andrea orders placed.  No history of DENISE.  Pt plans to return to home with [facility].  Will need post op pt/ot evals to assist with discharge planning.  CM/MARINA to follow.  Primitivo Kohler RN, [phone].

## 2024-05-16 NOTE — PLAN OF CARE
Problem: Safety - Adult  Goal: Free from fall injury  5/16/2024 1049 by Jaida Burgos RN  Outcome: Progressing  5/16/2024 0500 by Mary Zaman RN  Outcome: Progressing  5/16/2024 0346 by Mary Zaman RN  Outcome: Progressing     Problem: ABCDS Injury Assessment  Goal: Absence of physical injury  5/16/2024 1049 by Jaida Burgos RN  Outcome: Progressing  5/16/2024 0500 by Mary Zaman RN  Outcome: Progressing  5/16/2024 0346 by Mary Zaman RN  Outcome: Progressing

## 2024-05-16 NOTE — CONSULTS
[facility]  [facility]    Consultation Note     Admit Date: 5/15/2024  5:30 PM    Reason for Consult:   right diabetic foot infection    Attending Physician:  Nirali Sandoval MD     Chief Complaint: drainage of foot     HISTORY OF PRESENT ILLNESS:   The patient is a 53 y.o.  male known to the Infectious Diseases service. The patient is followed by Dr Marr. He was admitted for plantar wound  sent in by podiatry.   Surgery per podiatry    Past Medical History:        Diagnosis Date    Ankle ulcer, left, limited to breakdown of skin ([facility]) 9/18/2023    Anxiety     Atrial fibrillation, unspecified type ([facility])     CAD (coronary artery disease)     Cardiomyopathy ([facility])     CKD (chronic kidney disease)     Decreased dorsalis pedis pulse 9/18/2023    Depression     HTN (hypertension)     Hyperlipidemia     LBBB (left bundle branch block)     PVD (peripheral vascular disease) ([facility]) 9/18/2023    Type 2 diabetes mellitus without complication ([facility])     Varicose veins of bilateral lower extremities with other complications 9/18/2023    Mild, bilateral below the knee noted                         Past Surgical History:        Procedure Laterality Date    CARDIAC SURGERY      CORONARY ARTERY BYPASS GRAFT  12/2020    EYE LID SURGERY      FOOT SURGERY Bilateral     FRACTURE SURGERY      OTHER SURGICAL HISTORY      atrial clip-has card in wallet    VEIN SURGERY       Current Medications:   Scheduled Meds:   vancomycin  1,500 mg IntraVENous Q8H    sodium chloride flush  5-40 mL IntraVENous 2 times per day    melatonin  6 mg Oral Nightly    cefepime  2,000 mg IntraVENous Q8H    vitamin C  1,000 mg Oral Daily    buPROPion  150 mg Oral QAM    busPIRone  15 mg Oral BID    empagliflozin  25 mg Oral Daily    gabapentin  300 mg Oral TID    insulin glargine  30 Units SubCUTAneous BID    metoprolol succinate  25 mg Oral Daily    rosuvastatin  40 mg Oral Nightly    sertraline  50 mg Oral Daily    vitamin D3

## 2024-05-17 LAB
CREAT SERPL-MCNC: 0.7 MG/DL (ref 0.7–1.2)
DATE LAST DOSE: NORMAL
GFR, ESTIMATED: >90 ML/MIN/1.73M2
TME LAST DOSE: NORMAL H
VANCOMYCIN DOSE: NORMAL MG
VANCOMYCIN TROUGH SERPL-MCNC: 14.3 UG/ML (ref 5–16)

## 2024-05-17 PROCEDURE — 0Q9N0ZZ DRAINAGE OF RIGHT METATARSAL, OPEN APPROACH: ICD-10-PCS | Performed by: PODIATRIST

## 2024-05-17 PROCEDURE — 2580000003 HC RX 258

## 2024-05-17 PROCEDURE — 87045 FECES CULTURE AEROBIC BACT: CPT

## 2024-05-17 PROCEDURE — 80202 ASSAY OF VANCOMYCIN: CPT

## 2024-05-17 PROCEDURE — 0Y6V0Z0 DETACHMENT AT RIGHT 4TH TOE, COMPLETE, OPEN APPROACH: ICD-10-PCS | Performed by: PODIATRIST

## 2024-05-17 PROCEDURE — 87324 CLOSTRIDIUM AG IA: CPT

## 2024-05-17 PROCEDURE — 6370000000 HC RX 637 (ALT 250 FOR IP): Performed by: STUDENT IN AN ORGANIZED HEALTH CARE EDUCATION/TRAINING PROGRAM

## 2024-05-17 PROCEDURE — 87427 SHIGA-LIKE TOXIN AG IA: CPT

## 2024-05-17 PROCEDURE — 0KNV0ZZ RELEASE RIGHT FOOT MUSCLE, OPEN APPROACH: ICD-10-PCS | Performed by: PODIATRIST

## 2024-05-17 PROCEDURE — 6360000002 HC RX W HCPCS

## 2024-05-17 PROCEDURE — 6370000000 HC RX 637 (ALT 250 FOR IP)

## 2024-05-17 PROCEDURE — 87449 NOS EACH ORGANISM AG IA: CPT

## 2024-05-17 PROCEDURE — 87046 STOOL CULTR AEROBIC BACT EA: CPT

## 2024-05-17 PROCEDURE — 82565 ASSAY OF CREATININE: CPT

## 2024-05-17 PROCEDURE — 1200000000 HC SEMI PRIVATE

## 2024-05-17 PROCEDURE — 99232 SBSQ HOSP IP/OBS MODERATE 35: CPT | Performed by: STUDENT IN AN ORGANIZED HEALTH CARE EDUCATION/TRAINING PROGRAM

## 2024-05-17 PROCEDURE — 36415 COLL VENOUS BLD VENIPUNCTURE: CPT

## 2024-05-17 RX ORDER — DIPHENOXYLATE HYDROCHLORIDE AND ATROPINE SULFATE 2.5; .025 MG/1; MG/1
1 TABLET ORAL ONCE
Status: COMPLETED | OUTPATIENT
Start: 2024-05-17 | End: 2024-05-17

## 2024-05-17 RX ADMIN — EMPAGLIFLOZIN 25 MG: 10 TABLET, FILM COATED ORAL at 08:30

## 2024-05-17 RX ADMIN — BUPROPION HYDROCHLORIDE 150 MG: 150 TABLET, EXTENDED RELEASE ORAL at 08:30

## 2024-05-17 RX ADMIN — Medication 1000 MG: at 08:31

## 2024-05-17 RX ADMIN — Medication 1500 MG: at 08:39

## 2024-05-17 RX ADMIN — MEROPENEM 1000 MG: 1 INJECTION, POWDER, FOR SOLUTION INTRAVENOUS at 22:52

## 2024-05-17 RX ADMIN — BUSPIRONE HYDROCHLORIDE 15 MG: 7.5 TABLET ORAL at 21:35

## 2024-05-17 RX ADMIN — ROSUVASTATIN 40 MG: 20 TABLET, FILM COATED ORAL at 21:35

## 2024-05-17 RX ADMIN — GABAPENTIN 300 MG: 300 CAPSULE ORAL at 12:52

## 2024-05-17 RX ADMIN — SODIUM CHLORIDE, PRESERVATIVE FREE 10 ML: 5 INJECTION INTRAVENOUS at 08:29

## 2024-05-17 RX ADMIN — BUSPIRONE HYDROCHLORIDE 15 MG: 7.5 TABLET ORAL at 08:31

## 2024-05-17 RX ADMIN — CHOLECALCIFEROL TAB 125 MCG (5000 UNIT) 5000 UNITS: 125 TAB at 08:30

## 2024-05-17 RX ADMIN — Medication 1500 MG: at 01:45

## 2024-05-17 RX ADMIN — INSULIN GLARGINE 30 UNITS: 100 INJECTION, SOLUTION SUBCUTANEOUS at 21:34

## 2024-05-17 RX ADMIN — SODIUM CHLORIDE, PRESERVATIVE FREE 10 ML: 5 INJECTION INTRAVENOUS at 21:35

## 2024-05-17 RX ADMIN — MEROPENEM 1000 MG: 1 INJECTION, POWDER, FOR SOLUTION INTRAVENOUS at 14:09

## 2024-05-17 RX ADMIN — SODIUM CHLORIDE, PRESERVATIVE FREE 10 ML: 5 INJECTION INTRAVENOUS at 21:42

## 2024-05-17 RX ADMIN — Medication 1500 MG: at 18:18

## 2024-05-17 RX ADMIN — GABAPENTIN 300 MG: 300 CAPSULE ORAL at 21:35

## 2024-05-17 RX ADMIN — GABAPENTIN 300 MG: 300 CAPSULE ORAL at 08:31

## 2024-05-17 RX ADMIN — INSULIN GLARGINE 30 UNITS: 100 INJECTION, SOLUTION SUBCUTANEOUS at 08:32

## 2024-05-17 RX ADMIN — DIPHENOXYLATE HYDROCHLORIDE AND ATROPINE SULFATE 1 TABLET: 2.5; .025 TABLET ORAL at 16:42

## 2024-05-17 RX ADMIN — SERTRALINE 50 MG: 50 TABLET, FILM COATED ORAL at 08:32

## 2024-05-17 RX ADMIN — INSULIN LISPRO 4 UNITS: 100 INJECTION, SOLUTION INTRAVENOUS; SUBCUTANEOUS at 21:34

## 2024-05-17 RX ADMIN — SODIUM CHLORIDE, PRESERVATIVE FREE 10 ML: 5 INJECTION INTRAVENOUS at 08:41

## 2024-05-17 RX ADMIN — MEROPENEM 1000 MG: 1 INJECTION, POWDER, FOR SOLUTION INTRAVENOUS at 05:43

## 2024-05-17 RX ADMIN — METOPROLOL SUCCINATE 25 MG: 25 TABLET, EXTENDED RELEASE ORAL at 08:33

## 2024-05-17 ASSESSMENT — PAIN DESCRIPTION - ORIENTATION: ORIENTATION: RIGHT

## 2024-05-17 ASSESSMENT — PAIN SCALES - GENERAL
PAINLEVEL_OUTOF10: 3
PAINLEVEL_OUTOF10: 0

## 2024-05-17 ASSESSMENT — PAIN DESCRIPTION - LOCATION: LOCATION: FOOT

## 2024-05-17 ASSESSMENT — PAIN DESCRIPTION - DESCRIPTORS: DESCRIPTORS: ACHING;DISCOMFORT

## 2024-05-17 NOTE — BRIEF OP NOTE
Brief Postoperative Note      Patient: Dejan Patino  YOB: 1970  MRN: [id number]    Date of Procedure: 5/16/2024    Pre-Op Diagnosis Codes:     * Foot ulceration, right, with fat layer exposed ([facility]) [L97.512]    Post-Op Diagnosis: Same       Procedure(s):  RIGHT FOOT DEBRIDEMENT OF ALL NONVIABLE TISSUE, POSSIBLE BONE BIOPSY, POSSIBLE FOURTH TOE AMPUTATION    Surgeon(s):  Romeo Marr DPM Patel, Neathie, DPM    Assistant:  Resident: Prema Reynolds DPM    Anesthesia: Monitor Anesthesia Care    Estimated Blood Loss (mL): Minimal    Complications: None    Specimens:   ID Type Source Tests Collected by Time Destination   1 : RIGHT FOOT TISSUE CULTURE Tissue Tissue CULTURE, ANAEROBIC, CULTURE, FUNGUS, CULTURE, SURGICAL, CULTURE WITH SMEAR, ACID FAST BACILLIUS Romeo Marr DPM 5/16/2024 2055    2 : RIGHT FOOT 4TH METATARSAL BONE CULTURE Bone Bone CULTURE, ANAEROBIC, CULTURE, FUNGUS, GRAM STAIN, CULTURE, SURGICAL, CULTURE WITH SMEAR, ACID FAST BACILLIUS Romeo Marr DPM 5/16/2024 2101    A : RIGHT FOOT 4TH TOE Specimen Toe SURGICAL PATHOLOGY Romeo Marr DPM 5/16/2024 2059        Implants:  * No implants in log *      Drains: * No LDAs found *    Findings:  Infection Present At Time Of Surgery (PATOS) (choose all levels that have infection present):  - Deep Infection (muscle/fascia) present as evidenced by abscess  Other Findings: severe necrosis to plantar and lateral compartments and fourth MTPJ as well as fourth toe proximal phalanx    Electronically signed by Romeo Marr DPM on 5/16/2024 at 9:10 PM

## 2024-05-17 NOTE — OP NOTE
[facility]              [address] [place] [address]                            OPERATIVE REPORT      PATIENT NAME: JOSE PETIT              : 1970  MED REC NO: [id number]                        ROOM: [id number]  ACCOUNT NO: [id number]                       ADMIT DATE: 05/15/2024  PROVIDER: Romeo Marr DPM      DATE OF PROCEDURE:      SURGEON:  Romeo Marr DPM    PREOPERATIVE DIAGNOSES:    1. Cellulitis with abscess, right foot.  2. Acute osteomyelitis, right foot.  3. Necrotizing infection, right foot.    POSTOPERATIVE DIAGNOSES:    1. Cellulitis with abscess, right foot.  2. Acute osteomyelitis, right foot.  3. Necrotizing infection, right foot.    PROCEDURE:    1. Right 4th toe amputation to the level of metatarsophalangeal joint.  2. Incision and drainage, right 4th metatarsal bone.  3. Open fasciotomy, right lateral and plantar compartment of the forefoot.    ANESTHESIA:  Monitored anesthesia care.    INJECTABLES:  20 mL of 0.5% Marcaine plain.    COMPLICATIONS:  None.    HEMOSTASIS:  On the field.    ESTIMATED BLOOD LOSS:  Minimal.    SPECIMEN OBTAINED:  Right foot abscess, right 4th metatarsal bone for pathological examination and microbiology right 4th toe as well.    INTRAOPERATIVE FINDINGS:  Severe necrosis to plantar and lateral compartment and 4th metatarsophalangeal joints as well as 4th toe proximal phalanx.    INDICATIONS FOR PROCEDURE:  This is a pleasant 53-year-old male presenting for right foot debridement, incision and drainage.  I counseled the patient on the nature of the problem, proposed course of procedure that showed benefits, risks, complications, and convalescence in detail.  All questions were answered to the patient's satisfaction.  No guarantees were given as to the outcome of procedure.    DESCRIPTION OF PROCEDURE:  Under mild sedation, the patient was brought to the operating room and was laid on the operating table

## 2024-05-17 NOTE — DISCHARGE INSTR - COC
Continuity of Care Form    Patient Name: Dejan Patino   :  1970  MRN:  [id number]    Admit date:  5/15/2024  Discharge date:  ***    Code Status Order: Full Code   Advance Directives:     Admitting Physician:  Morenita Perera DO  PCP: Roger Smith MD    Discharging Nurse: ***  Discharging Hospital Unit/Room#: 8423/8423-B  Discharging Unit Phone Number: ***    Emergency Contact:   Extended Emergency Contact Information  Primary Emergency Contact: RUSH PATINO  Mobile Phone: [phone]  Relation: Child  Preferred language: English   needed? No  Secondary Emergency Contact: DANITA ALMARAZ  Address: [address] [place] [address]  Home Phone: [phone]  Mobile Phone: [phone]  Relation: Ex-Spouse    Past Surgical History:  Past Surgical History:   Procedure Laterality Date    CARDIAC SURGERY      CORONARY ARTERY BYPASS GRAFT  2020    EYE LID SURGERY      FOOT DEBRIDEMENT Right 2024    RIGHT FOOT DEBRIDEMENT OF ALL NONVIABLE TISSUE, FOURTH TOE AMPUTATION performed by Romeo Marr DPM at [facility]    FOOT SURGERY Bilateral     FRACTURE SURGERY      OTHER SURGICAL HISTORY      atrial clip-has card in wallet    VEIN SURGERY         Immunization History:   Immunization History   Administered Date(s) Administered    COVID-19, MODERNA BLUE border, Primary or Immunocompromised, (age 12y+), IM, 100 mcg/0.5mL 2021, 2021    TDaP, ADACEL (age 10y-64y), BOOSTRIX (age 10y+), IM, 0.5mL 2024       Active Problems:  Patient Active Problem List   Diagnosis Code    Charcot foot due to diabetes mellitus (HCC) E11.610    History of four vessel coronary artery bypass graft Z95.1    HERNANDEZ (dyspnea on exertion) R06.09    Poorly controlled type 2 diabetes mellitus (HCC) E11.65    Hemorrhoids, external without complications K64.4    Vitamin D deficiency E55.9    Type 2 diabetes mellitus with diabetic peripheral angiopathy without gangrene, with

## 2024-05-17 NOTE — CARE COORDINATION
Social Work/Case Management Transition of Care Planning (Lee Ann Cortez [phone]):  Per report and chart review, patient had right foot debridement and amputation of 4th toe on 5/16.  Podiatry is following. Post op PT/OT evals are needed.   He is on IV Merrem q8 and IV Vanc q8.  Cultures are pending.  ID is following.  Will need final plan from ID and podiatry.  Met with patient at bedside. Discharge plan is to home with [facility].  VIRGIL orders are in the chart.  LUI/destination completed.  CM/SW will follow.   Lee Ann Cortez, BRITTANY  5/17/2024

## 2024-05-18 LAB
ANION GAP SERPL CALCULATED.3IONS-SCNC: 12 MMOL/L (ref 7–16)
BASOPHILS # BLD: 0.04 K/UL (ref 0–0.2)
BASOPHILS NFR BLD: 1 % (ref 0–2)
BUN SERPL-MCNC: 11 MG/DL (ref 6–20)
CALCIUM SERPL-MCNC: 8.5 MG/DL (ref 8.6–10.2)
CHLORIDE SERPL-SCNC: 102 MMOL/L (ref 98–107)
CO2 SERPL-SCNC: 24 MMOL/L (ref 22–29)
CREAT SERPL-MCNC: 0.7 MG/DL (ref 0.7–1.2)
EOSINOPHIL # BLD: 0.16 K/UL (ref 0.05–0.5)
EOSINOPHILS RELATIVE PERCENT: 3 % (ref 0–6)
ERYTHROCYTE [DISTWIDTH] IN BLOOD BY AUTOMATED COUNT: 13 % (ref 11.5–15)
GFR, ESTIMATED: >90 ML/MIN/1.73M2
GLUCOSE SERPL-MCNC: 138 MG/DL (ref 74–99)
HCT VFR BLD AUTO: 37 % (ref 37–54)
HGB BLD-MCNC: 12.1 G/DL (ref 12.5–16.5)
IMM GRANULOCYTES # BLD AUTO: 0.03 K/UL (ref 0–0.58)
IMM GRANULOCYTES NFR BLD: 1 % (ref 0–5)
LYMPHOCYTES NFR BLD: 1.04 K/UL (ref 1.5–4)
LYMPHOCYTES RELATIVE PERCENT: 18 % (ref 20–42)
MCH RBC QN AUTO: 28.3 PG (ref 26–35)
MCHC RBC AUTO-ENTMCNC: 32.7 G/DL (ref 32–34.5)
MCV RBC AUTO: 86.4 FL (ref 80–99.9)
MICROORGANISM SPEC CULT: NO GROWTH
MICROORGANISM/AGENT SPEC: NORMAL
MONOCYTES NFR BLD: 0.69 K/UL (ref 0.1–0.95)
MONOCYTES NFR BLD: 12 % (ref 2–12)
NEUTROPHILS NFR BLD: 67 % (ref 43–80)
NEUTS SEG NFR BLD: 3.96 K/UL (ref 1.8–7.3)
PLATELET # BLD AUTO: 159 K/UL (ref 130–450)
PMV BLD AUTO: 9.3 FL (ref 7–12)
POTASSIUM SERPL-SCNC: 3.8 MMOL/L (ref 3.5–5)
RBC # BLD AUTO: 4.28 M/UL (ref 3.8–5.8)
SODIUM SERPL-SCNC: 138 MMOL/L (ref 132–146)
SPECIMEN DESCRIPTION: NORMAL
WBC OTHER # BLD: 5.9 K/UL (ref 4.5–11.5)

## 2024-05-18 PROCEDURE — 2580000003 HC RX 258

## 2024-05-18 PROCEDURE — 36415 COLL VENOUS BLD VENIPUNCTURE: CPT

## 2024-05-18 PROCEDURE — 6370000000 HC RX 637 (ALT 250 FOR IP)

## 2024-05-18 PROCEDURE — 6360000002 HC RX W HCPCS

## 2024-05-18 PROCEDURE — 80048 BASIC METABOLIC PNL TOTAL CA: CPT

## 2024-05-18 PROCEDURE — 1200000000 HC SEMI PRIVATE

## 2024-05-18 PROCEDURE — 99232 SBSQ HOSP IP/OBS MODERATE 35: CPT | Performed by: INTERNAL MEDICINE

## 2024-05-18 PROCEDURE — 85025 COMPLETE CBC W/AUTO DIFF WBC: CPT

## 2024-05-18 RX ADMIN — INSULIN GLARGINE 30 UNITS: 100 INJECTION, SOLUTION SUBCUTANEOUS at 09:13

## 2024-05-18 RX ADMIN — BUSPIRONE HYDROCHLORIDE 15 MG: 7.5 TABLET ORAL at 09:12

## 2024-05-18 RX ADMIN — Medication 1500 MG: at 19:24

## 2024-05-18 RX ADMIN — MEROPENEM 1000 MG: 1 INJECTION, POWDER, FOR SOLUTION INTRAVENOUS at 14:45

## 2024-05-18 RX ADMIN — EMPAGLIFLOZIN 25 MG: 10 TABLET, FILM COATED ORAL at 09:13

## 2024-05-18 RX ADMIN — Medication 1500 MG: at 11:11

## 2024-05-18 RX ADMIN — INSULIN GLARGINE 30 UNITS: 100 INJECTION, SOLUTION SUBCUTANEOUS at 20:57

## 2024-05-18 RX ADMIN — SODIUM CHLORIDE, PRESERVATIVE FREE 10 ML: 5 INJECTION INTRAVENOUS at 21:38

## 2024-05-18 RX ADMIN — SODIUM CHLORIDE, PRESERVATIVE FREE 10 ML: 5 INJECTION INTRAVENOUS at 11:11

## 2024-05-18 RX ADMIN — MEROPENEM 1000 MG: 1 INJECTION, POWDER, FOR SOLUTION INTRAVENOUS at 22:03

## 2024-05-18 RX ADMIN — CHOLECALCIFEROL TAB 125 MCG (5000 UNIT) 5000 UNITS: 125 TAB at 09:17

## 2024-05-18 RX ADMIN — SERTRALINE 50 MG: 50 TABLET, FILM COATED ORAL at 09:13

## 2024-05-18 RX ADMIN — METOPROLOL SUCCINATE 25 MG: 25 TABLET, EXTENDED RELEASE ORAL at 09:12

## 2024-05-18 RX ADMIN — GABAPENTIN 300 MG: 300 CAPSULE ORAL at 12:38

## 2024-05-18 RX ADMIN — BUSPIRONE HYDROCHLORIDE 15 MG: 7.5 TABLET ORAL at 20:57

## 2024-05-18 RX ADMIN — GABAPENTIN 300 MG: 300 CAPSULE ORAL at 09:13

## 2024-05-18 RX ADMIN — MEROPENEM 1000 MG: 1 INJECTION, POWDER, FOR SOLUTION INTRAVENOUS at 05:42

## 2024-05-18 RX ADMIN — GABAPENTIN 300 MG: 300 CAPSULE ORAL at 20:57

## 2024-05-18 RX ADMIN — SODIUM CHLORIDE, PRESERVATIVE FREE 10 ML: 5 INJECTION INTRAVENOUS at 20:57

## 2024-05-18 RX ADMIN — BUPROPION HYDROCHLORIDE 150 MG: 150 TABLET, EXTENDED RELEASE ORAL at 09:12

## 2024-05-18 RX ADMIN — Medication 1000 MG: at 09:12

## 2024-05-18 RX ADMIN — ROSUVASTATIN 40 MG: 20 TABLET, FILM COATED ORAL at 20:57

## 2024-05-18 RX ADMIN — OXYCODONE HYDROCHLORIDE AND ACETAMINOPHEN 1 TABLET: 5; 325 TABLET ORAL at 17:38

## 2024-05-18 RX ADMIN — Medication 1500 MG: at 01:44

## 2024-05-18 RX ADMIN — SODIUM CHLORIDE, PRESERVATIVE FREE 10 ML: 5 INJECTION INTRAVENOUS at 11:12

## 2024-05-18 ASSESSMENT — PAIN SCALES - GENERAL
PAINLEVEL_OUTOF10: 3
PAINLEVEL_OUTOF10: 7

## 2024-05-18 ASSESSMENT — PAIN DESCRIPTION - DESCRIPTORS: DESCRIPTORS: ACHING;DISCOMFORT;GNAWING

## 2024-05-18 ASSESSMENT — PAIN DESCRIPTION - ORIENTATION: ORIENTATION: RIGHT

## 2024-05-18 ASSESSMENT — PAIN DESCRIPTION - LOCATION: LOCATION: FOOT

## 2024-05-18 NOTE — PLAN OF CARE
Problem: Safety - Adult  Goal: Free from fall injury  Outcome: Progressing     Problem: ABCDS Injury Assessment  Goal: Absence of physical injury  Outcome: Progressing     Problem: Discharge Planning  Goal: Discharge to home or other facility with appropriate resources  Outcome: Progressing     Problem: Pain  Goal: Verbalizes/displays adequate comfort level or baseline comfort level  Outcome: Progressing     Problem: Chronic Conditions and Co-morbidities  Goal: Patient's chronic conditions and co-morbidity symptoms are monitored and maintained or improved  Outcome: Progressing

## 2024-05-19 LAB
GLUCOSE BLD-MCNC: 177 MG/DL (ref 74–99)
MICROORGANISM SPEC CULT: ABNORMAL
MICROORGANISM SPEC CULT: ABNORMAL
MICROORGANISM/AGENT SPEC: ABNORMAL
SPECIMEN DESCRIPTION: ABNORMAL

## 2024-05-19 PROCEDURE — 99232 SBSQ HOSP IP/OBS MODERATE 35: CPT | Performed by: INTERNAL MEDICINE

## 2024-05-19 PROCEDURE — 6360000002 HC RX W HCPCS

## 2024-05-19 PROCEDURE — 2580000003 HC RX 258

## 2024-05-19 PROCEDURE — 1200000000 HC SEMI PRIVATE

## 2024-05-19 PROCEDURE — 6370000000 HC RX 637 (ALT 250 FOR IP)

## 2024-05-19 PROCEDURE — 82962 GLUCOSE BLOOD TEST: CPT

## 2024-05-19 RX ADMIN — BUSPIRONE HYDROCHLORIDE 15 MG: 7.5 TABLET ORAL at 08:26

## 2024-05-19 RX ADMIN — Medication 1500 MG: at 03:13

## 2024-05-19 RX ADMIN — EMPAGLIFLOZIN 10 MG: 10 TABLET, FILM COATED ORAL at 09:59

## 2024-05-19 RX ADMIN — MEROPENEM 1000 MG: 1 INJECTION, POWDER, FOR SOLUTION INTRAVENOUS at 22:05

## 2024-05-19 RX ADMIN — SODIUM CHLORIDE, PRESERVATIVE FREE 10 ML: 5 INJECTION INTRAVENOUS at 08:24

## 2024-05-19 RX ADMIN — INSULIN GLARGINE 30 UNITS: 100 INJECTION, SOLUTION SUBCUTANEOUS at 08:25

## 2024-05-19 RX ADMIN — METOPROLOL SUCCINATE 25 MG: 25 TABLET, EXTENDED RELEASE ORAL at 08:25

## 2024-05-19 RX ADMIN — ROSUVASTATIN 40 MG: 20 TABLET, FILM COATED ORAL at 20:30

## 2024-05-19 RX ADMIN — SERTRALINE 50 MG: 50 TABLET, FILM COATED ORAL at 09:55

## 2024-05-19 RX ADMIN — Medication 1500 MG: at 18:43

## 2024-05-19 RX ADMIN — Medication 1500 MG: at 10:47

## 2024-05-19 RX ADMIN — MEROPENEM 1000 MG: 1 INJECTION, POWDER, FOR SOLUTION INTRAVENOUS at 05:51

## 2024-05-19 RX ADMIN — BUSPIRONE HYDROCHLORIDE 15 MG: 7.5 TABLET ORAL at 20:30

## 2024-05-19 RX ADMIN — GABAPENTIN 300 MG: 300 CAPSULE ORAL at 20:30

## 2024-05-19 RX ADMIN — Medication 1000 MG: at 08:25

## 2024-05-19 RX ADMIN — BUPROPION HYDROCHLORIDE 150 MG: 150 TABLET, EXTENDED RELEASE ORAL at 08:24

## 2024-05-19 RX ADMIN — GABAPENTIN 300 MG: 300 CAPSULE ORAL at 08:25

## 2024-05-19 RX ADMIN — CHOLECALCIFEROL TAB 125 MCG (5000 UNIT) 5000 UNITS: 125 TAB at 08:24

## 2024-05-19 RX ADMIN — MEROPENEM 1000 MG: 1 INJECTION, POWDER, FOR SOLUTION INTRAVENOUS at 13:48

## 2024-05-19 RX ADMIN — INSULIN GLARGINE 30 UNITS: 100 INJECTION, SOLUTION SUBCUTANEOUS at 20:30

## 2024-05-19 RX ADMIN — GABAPENTIN 300 MG: 300 CAPSULE ORAL at 11:46

## 2024-05-19 ASSESSMENT — PAIN SCALES - GENERAL: PAINLEVEL_OUTOF10: 2

## 2024-05-19 NOTE — PLAN OF CARE
Problem: Safety - Adult  Goal: Free from fall injury  5/19/2024 1308 by Palmira Tirado RN  Outcome: Progressing     Problem: ABCDS Injury Assessment  Goal: Absence of physical injury  5/19/2024 1308 by Palmira Tirado RN  Outcome: Progressing     Problem: Discharge Planning  Goal: Discharge to home or other facility with appropriate resources  5/19/2024 1308 by Palmira Tirado RN  Outcome: Progressing     Problem: Pain  Goal: Verbalizes/displays adequate comfort level or baseline comfort level  5/19/2024 1308 by Palmira Tirado RN  Outcome: Progressing     Problem: Chronic Conditions and Co-morbidities  Goal: Patient's chronic conditions and co-morbidity symptoms are monitored and maintained or improved  5/19/2024 1308 by Palmira Tirado RN  Outcome: Progressing

## 2024-05-20 LAB
ANION GAP SERPL CALCULATED.3IONS-SCNC: 14 MMOL/L (ref 7–16)
BASOPHILS # BLD: 0.05 K/UL (ref 0–0.2)
BASOPHILS NFR BLD: 1 % (ref 0–2)
BUN SERPL-MCNC: 8 MG/DL (ref 6–20)
CALCIUM SERPL-MCNC: 9 MG/DL (ref 8.6–10.2)
CHLORIDE SERPL-SCNC: 102 MMOL/L (ref 98–107)
CO2 SERPL-SCNC: 24 MMOL/L (ref 22–29)
CREAT SERPL-MCNC: 0.7 MG/DL (ref 0.7–1.2)
EOSINOPHIL # BLD: 0.16 K/UL (ref 0.05–0.5)
EOSINOPHILS RELATIVE PERCENT: 3 % (ref 0–6)
ERYTHROCYTE [DISTWIDTH] IN BLOOD BY AUTOMATED COUNT: 12.8 % (ref 11.5–15)
GFR, ESTIMATED: >90 ML/MIN/1.73M2
GLUCOSE BLD-MCNC: 109 MG/DL (ref 74–99)
GLUCOSE BLD-MCNC: 115 MG/DL (ref 74–99)
GLUCOSE BLD-MCNC: 252 MG/DL (ref 74–99)
GLUCOSE SERPL-MCNC: 168 MG/DL (ref 74–99)
HCT VFR BLD AUTO: 37 % (ref 37–54)
HGB BLD-MCNC: 12.2 G/DL (ref 12.5–16.5)
IMM GRANULOCYTES # BLD AUTO: 0.03 K/UL (ref 0–0.58)
IMM GRANULOCYTES NFR BLD: 1 % (ref 0–5)
LYMPHOCYTES NFR BLD: 1.27 K/UL (ref 1.5–4)
LYMPHOCYTES RELATIVE PERCENT: 22 % (ref 20–42)
MCH RBC QN AUTO: 28.4 PG (ref 26–35)
MCHC RBC AUTO-ENTMCNC: 33 G/DL (ref 32–34.5)
MCV RBC AUTO: 86.2 FL (ref 80–99.9)
MICROORGANISM SPEC CULT: ABNORMAL
MICROORGANISM SPEC CULT: NORMAL
MICROORGANISM SPEC CULT: NORMAL
MONOCYTES NFR BLD: 0.68 K/UL (ref 0.1–0.95)
MONOCYTES NFR BLD: 12 % (ref 2–12)
NEUTROPHILS NFR BLD: 63 % (ref 43–80)
NEUTS SEG NFR BLD: 3.66 K/UL (ref 1.8–7.3)
PLATELET # BLD AUTO: 193 K/UL (ref 130–450)
PMV BLD AUTO: 9.2 FL (ref 7–12)
POTASSIUM SERPL-SCNC: 4 MMOL/L (ref 3.5–5)
RBC # BLD AUTO: 4.29 M/UL (ref 3.8–5.8)
SERVICE CMNT-IMP: NORMAL
SERVICE CMNT-IMP: NORMAL
SODIUM SERPL-SCNC: 140 MMOL/L (ref 132–146)
SPECIMEN DESCRIPTION: ABNORMAL
SPECIMEN DESCRIPTION: ABNORMAL
SPECIMEN DESCRIPTION: NORMAL
SPECIMEN DESCRIPTION: NORMAL
WBC OTHER # BLD: 5.9 K/UL (ref 4.5–11.5)

## 2024-05-20 PROCEDURE — 97161 PT EVAL LOW COMPLEX 20 MIN: CPT

## 2024-05-20 PROCEDURE — 6360000002 HC RX W HCPCS

## 2024-05-20 PROCEDURE — 1200000000 HC SEMI PRIVATE

## 2024-05-20 PROCEDURE — 97166 OT EVAL MOD COMPLEX 45 MIN: CPT

## 2024-05-20 PROCEDURE — 99232 SBSQ HOSP IP/OBS MODERATE 35: CPT | Performed by: INTERNAL MEDICINE

## 2024-05-20 PROCEDURE — 2580000003 HC RX 258

## 2024-05-20 PROCEDURE — 97530 THERAPEUTIC ACTIVITIES: CPT

## 2024-05-20 PROCEDURE — 85025 COMPLETE CBC W/AUTO DIFF WBC: CPT

## 2024-05-20 PROCEDURE — 97535 SELF CARE MNGMENT TRAINING: CPT

## 2024-05-20 PROCEDURE — 6370000000 HC RX 637 (ALT 250 FOR IP)

## 2024-05-20 PROCEDURE — 80048 BASIC METABOLIC PNL TOTAL CA: CPT

## 2024-05-20 PROCEDURE — 82962 GLUCOSE BLOOD TEST: CPT

## 2024-05-20 PROCEDURE — 36415 COLL VENOUS BLD VENIPUNCTURE: CPT

## 2024-05-20 RX ORDER — SODIUM CHLORIDE 0.9 % (FLUSH) 0.9 %
5-40 SYRINGE (ML) INJECTION EVERY 12 HOURS SCHEDULED
Status: DISCONTINUED | OUTPATIENT
Start: 2024-05-20 | End: 2024-05-21 | Stop reason: HOSPADM

## 2024-05-20 RX ORDER — LIDOCAINE HYDROCHLORIDE 10 MG/ML
5 INJECTION, SOLUTION EPIDURAL; INFILTRATION; INTRACAUDAL; PERINEURAL ONCE
Status: DISCONTINUED | OUTPATIENT
Start: 2024-05-20 | End: 2024-05-21 | Stop reason: HOSPADM

## 2024-05-20 RX ORDER — METRONIDAZOLE 500 MG/1
500 TABLET ORAL EVERY 8 HOURS SCHEDULED
Status: DISCONTINUED | OUTPATIENT
Start: 2024-05-20 | End: 2024-05-21 | Stop reason: HOSPADM

## 2024-05-20 RX ORDER — HEPARIN 100 UNIT/ML
3 SYRINGE INTRAVENOUS PRN
Status: DISCONTINUED | OUTPATIENT
Start: 2024-05-20 | End: 2024-05-21 | Stop reason: HOSPADM

## 2024-05-20 RX ORDER — SODIUM CHLORIDE 0.9 % (FLUSH) 0.9 %
5-40 SYRINGE (ML) INJECTION PRN
Status: DISCONTINUED | OUTPATIENT
Start: 2024-05-20 | End: 2024-05-21 | Stop reason: HOSPADM

## 2024-05-20 RX ORDER — HEPARIN 100 UNIT/ML
3 SYRINGE INTRAVENOUS EVERY 12 HOURS SCHEDULED
Status: DISCONTINUED | OUTPATIENT
Start: 2024-05-20 | End: 2024-05-21 | Stop reason: HOSPADM

## 2024-05-20 RX ORDER — SODIUM CHLORIDE 9 MG/ML
INJECTION, SOLUTION INTRAVENOUS PRN
Status: DISCONTINUED | OUTPATIENT
Start: 2024-05-20 | End: 2024-05-21 | Stop reason: HOSPADM

## 2024-05-20 RX ADMIN — SODIUM CHLORIDE, PRESERVATIVE FREE 10 ML: 5 INJECTION INTRAVENOUS at 21:29

## 2024-05-20 RX ADMIN — EMPAGLIFLOZIN 10 MG: 10 TABLET, FILM COATED ORAL at 09:44

## 2024-05-20 RX ADMIN — Medication 6 MG: at 21:29

## 2024-05-20 RX ADMIN — METOPROLOL SUCCINATE 25 MG: 25 TABLET, EXTENDED RELEASE ORAL at 09:42

## 2024-05-20 RX ADMIN — MEROPENEM 1000 MG: 1 INJECTION, POWDER, FOR SOLUTION INTRAVENOUS at 09:40

## 2024-05-20 RX ADMIN — Medication 1000 MG: at 09:42

## 2024-05-20 RX ADMIN — WATER 2000 MG: 1 INJECTION INTRAMUSCULAR; INTRAVENOUS; SUBCUTANEOUS at 15:21

## 2024-05-20 RX ADMIN — SODIUM CHLORIDE, PRESERVATIVE FREE 10 ML: 5 INJECTION INTRAVENOUS at 09:43

## 2024-05-20 RX ADMIN — METRONIDAZOLE 500 MG: 500 TABLET ORAL at 21:28

## 2024-05-20 RX ADMIN — WATER 2000 MG: 1 INJECTION INTRAMUSCULAR; INTRAVENOUS; SUBCUTANEOUS at 21:27

## 2024-05-20 RX ADMIN — Medication 1500 MG: at 12:46

## 2024-05-20 RX ADMIN — METRONIDAZOLE 500 MG: 500 TABLET ORAL at 15:20

## 2024-05-20 RX ADMIN — INSULIN GLARGINE 30 UNITS: 100 INJECTION, SOLUTION SUBCUTANEOUS at 09:43

## 2024-05-20 RX ADMIN — GABAPENTIN 300 MG: 300 CAPSULE ORAL at 21:28

## 2024-05-20 RX ADMIN — BUSPIRONE HYDROCHLORIDE 15 MG: 7.5 TABLET ORAL at 21:28

## 2024-05-20 RX ADMIN — CHOLECALCIFEROL TAB 125 MCG (5000 UNIT) 5000 UNITS: 125 TAB at 09:45

## 2024-05-20 RX ADMIN — INSULIN GLARGINE 30 UNITS: 100 INJECTION, SOLUTION SUBCUTANEOUS at 21:28

## 2024-05-20 RX ADMIN — ROSUVASTATIN 40 MG: 20 TABLET, FILM COATED ORAL at 21:28

## 2024-05-20 RX ADMIN — SERTRALINE 50 MG: 50 TABLET, FILM COATED ORAL at 09:42

## 2024-05-20 RX ADMIN — BUPROPION HYDROCHLORIDE 150 MG: 150 TABLET, EXTENDED RELEASE ORAL at 09:45

## 2024-05-20 RX ADMIN — GABAPENTIN 300 MG: 300 CAPSULE ORAL at 09:42

## 2024-05-20 RX ADMIN — Medication 1500 MG: at 03:28

## 2024-05-20 RX ADMIN — BUSPIRONE HYDROCHLORIDE 15 MG: 7.5 TABLET ORAL at 09:42

## 2024-05-20 ASSESSMENT — PAIN SCALES - GENERAL: PAINLEVEL_OUTOF10: 0

## 2024-05-20 NOTE — FLOWSHEET NOTE
Inpatient Wound Care (Initial consult) 8423B    Admit Date: 5/15/2024  5:30 PM    Reason for consult:  Right foot: Wound vac dressing    Significant history: Per H&P     PREOPERATIVE DIAGNOSES:    1. Cellulitis with abscess, right foot.  2. Acute osteomyelitis, right foot.  3. Necrotizing infection, right foot.     POSTOPERATIVE DIAGNOSES:    1. Cellulitis with abscess, right foot.  2. Acute osteomyelitis, right foot.  3. Necrotizing infection, right foot.     PROCEDURE:    1. Right 4th toe amputation to the level of metatarsophalangeal joint.  2. Incision and drainage, right 4th metatarsal bone.  3. Open fasciotomy, right lateral and plantar compartment of the forefoot.    Findings:     05/20/24 1250   Wound 05/20/24 Foot Right   Date First Assessed/Time First Assessed: 05/20/24 1250   Present on Original Admission: Yes  Location: Foot  Wound Location Orientation: Right   Dressing/Treatment Negative pressure wound therapy   Negative Pressure Wound Therapy Foot Right   Placement Date/Time: 05/19/24 1429   Present on Admission/Arrival: No  Location: Foot  Wound Location Orientation: Right   Wound Type Surgical   Dressing Type Black Foam   Cycle Continuous   Target Pressure (mmHg) 125   Dressing Change Due 05/22/24         Plan:Wound vac intact to right foot at 125mmhg continuous  Dressing change due on 5/22  Following patient        Aggie Hays RN 5/20/2024 2:31 PM

## 2024-05-20 NOTE — CARE COORDINATION
Social Work/Case Management Transition of Care Planning (Lee Ann Cortez [facility] [phone]):  Per report and chart review, patient is S/P right 4 digit amputation and open fasciotomy of lateral forefoot on 5/16.  Wound vac was applied. Phone call to [facility] for possible home wound vac. Podiatry is following.  Patient is on IV Vanc q8 and IV Merrem q8.  ID is following.  Will need final plan from ID and podiatry. Met with patient at bedside.  Discharge plan is to home with [facility].  VIRGIL orders are in the chart.  LUI/destination completed.  CM/SW will follow. BRITTANY Meyers  5/20/2024      Update:  PT/OT scores noted to be 18/19.  He was able to ambulate 15'x2 with FWW SBA.  Per ID, patient is now on IV Ancef q8 for 4 weeks tentatively.  No script is in the chart at this time.  CM/SW will follow.  BRITTANY Meyers  5/20/2024

## 2024-05-20 NOTE — PLAN OF CARE
Problem: Safety - Adult  Goal: Free from fall injury  5/20/2024 0044 by Monica Boo RN  Outcome: Progressing  5/19/2024 1308 by Palmira Tirado RN  Outcome: Progressing     Problem: ABCDS Injury Assessment  Goal: Absence of physical injury  5/20/2024 0044 by Monica Boo RN  Outcome: Progressing  5/19/2024 1308 by Palmira Tirado RN  Outcome: Progressing     Problem: Discharge Planning  Goal: Discharge to home or other facility with appropriate resources  5/20/2024 0044 by Monica Boo RN  Outcome: Progressing  5/19/2024 1308 by Palmira Tirado RN  Outcome: Progressing     Problem: Pain  Goal: Verbalizes/displays adequate comfort level or baseline comfort level  5/20/2024 0044 by Monica Boo RN  Outcome: Progressing  5/19/2024 1308 by Palmira Tirado RN  Outcome: Progressing     Problem: Chronic Conditions and Co-morbidities  Goal: Patient's chronic conditions and co-morbidity symptoms are monitored and maintained or improved  5/20/2024 0044 by Monica Boo RN  Outcome: Progressing  5/19/2024 1308 by Palmira Tirado RN  Outcome: Progressing

## 2024-05-20 NOTE — DISCHARGE INSTRUCTIONS
Your information:  Name: Dejan Patino  : 1970    Your instructions:    Home health care for wound vac dressing change:right foot: cleanse with normal saline then border wound with hydrocolloid or drape, then apply black foam. Wound vac at 125mmhg continuous. Change on Mon, Wed or Friday.     If wound vac therapy is off for more than 2 hours, apply saline moistened kerlix then cover with 4x4, abd pad and wrap with kerlix. Change dressing every 12 hours until wound vac can be reapplied.    What to do after you leave the hospital:    Recommended diet: {diet:35289}    Recommended activity: {discharge activity:30422}        The following personal items were collected during your admission and were returned to you:    Belongings  Dental Appliances: None  Vision - Corrective Lenses: Eyeglasses, At bedside  Hearing Aid: None  Clothing: Shorts, Socks, Shirt, Hat, Footwear, At bedside  Jewelry: None  Body Piercings Removed: N/A  Electronic Devices: Cell Phone, , At bedside  Weapons (Notify Protective Services/Security): None  Other Valuables: Other (Comment), At bedside (backpack)  Home Medications: None  Valuables Given To: Patient (Dejan)  Provide Name(s) of Who Valuable(s) Were Given To: dejan    Information obtained by:  By signing below, I understand that if any problems occur once I leave the hospital I am to contact ***.  I understand and acknowledge receipt of the instructions indicated above.         [facility]  ([facility])  [address] [place] [address]  [address] [place] [address]  Phone [phone]   Fax [phone]    Juwan Chun MD, FACP   MD Wanda James MD Indra P. Limbu, MD Eunice A. H. Wong, MD Shirisha Pasula, MD Andrea Dixon-Thiesler, CNS   Bayron Oneill, APRN, CNS  Morenita Butterfield, APRN-CNP    Omer Nguyen, APRN, CNS  Amarilis Mcneil, APRN-CNP   Adalberto Sarkar MD               STANDING ORDERS (“ID Protocol”)

## 2024-05-21 VITALS
TEMPERATURE: 97.4 F | OXYGEN SATURATION: 96 % | HEIGHT: 78 IN | BODY MASS INDEX: 34.84 KG/M2 | SYSTOLIC BLOOD PRESSURE: 148 MMHG | DIASTOLIC BLOOD PRESSURE: 88 MMHG | RESPIRATION RATE: 16 BRPM | WEIGHT: 301.15 LBS | HEART RATE: 68 BPM

## 2024-05-21 LAB
GLUCOSE BLD-MCNC: 113 MG/DL (ref 74–99)
GLUCOSE BLD-MCNC: 125 MG/DL (ref 74–99)
GLUCOSE BLD-MCNC: 181 MG/DL (ref 74–99)
MICROORGANISM SPEC CULT: ABNORMAL
MICROORGANISM/AGENT SPEC: ABNORMAL
SPECIMEN DESCRIPTION: ABNORMAL

## 2024-05-21 PROCEDURE — C1751 CATH, INF, PER/CENT/MIDLINE: HCPCS

## 2024-05-21 PROCEDURE — 6370000000 HC RX 637 (ALT 250 FOR IP)

## 2024-05-21 PROCEDURE — 36569 INSJ PICC 5 YR+ W/O IMAGING: CPT

## 2024-05-21 PROCEDURE — 2580000003 HC RX 258

## 2024-05-21 PROCEDURE — 6360000002 HC RX W HCPCS

## 2024-05-21 PROCEDURE — 76937 US GUIDE VASCULAR ACCESS: CPT

## 2024-05-21 PROCEDURE — 05HY33Z INSERTION OF INFUSION DEVICE INTO UPPER VEIN, PERCUTANEOUS APPROACH: ICD-10-PCS | Performed by: INTERNAL MEDICINE

## 2024-05-21 PROCEDURE — 82962 GLUCOSE BLOOD TEST: CPT

## 2024-05-21 RX ORDER — METRONIDAZOLE 500 MG/1
500 TABLET ORAL EVERY 8 HOURS SCHEDULED
Qty: 84 TABLET | Refills: 0 | Status: SHIPPED | OUTPATIENT
Start: 2024-05-21 | End: 2024-06-18

## 2024-05-21 RX ORDER — INSULIN GLARGINE 100 [IU]/ML
30 INJECTION, SOLUTION SUBCUTANEOUS 2 TIMES DAILY
Qty: 10 ML | Refills: 3 | Status: SHIPPED | OUTPATIENT
Start: 2024-05-21

## 2024-05-21 RX ADMIN — BUPROPION HYDROCHLORIDE 150 MG: 150 TABLET, EXTENDED RELEASE ORAL at 09:00

## 2024-05-21 RX ADMIN — CHOLECALCIFEROL TAB 125 MCG (5000 UNIT) 5000 UNITS: 125 TAB at 09:00

## 2024-05-21 RX ADMIN — SODIUM CHLORIDE, PRESERVATIVE FREE 10 ML: 5 INJECTION INTRAVENOUS at 08:58

## 2024-05-21 RX ADMIN — INSULIN GLARGINE 30 UNITS: 100 INJECTION, SOLUTION SUBCUTANEOUS at 08:57

## 2024-05-21 RX ADMIN — GABAPENTIN 300 MG: 300 CAPSULE ORAL at 08:57

## 2024-05-21 RX ADMIN — METRONIDAZOLE 500 MG: 500 TABLET ORAL at 06:12

## 2024-05-21 RX ADMIN — BUSPIRONE HYDROCHLORIDE 15 MG: 7.5 TABLET ORAL at 08:57

## 2024-05-21 RX ADMIN — SERTRALINE 50 MG: 50 TABLET, FILM COATED ORAL at 08:57

## 2024-05-21 RX ADMIN — EMPAGLIFLOZIN 10 MG: 10 TABLET, FILM COATED ORAL at 09:00

## 2024-05-21 RX ADMIN — METRONIDAZOLE 500 MG: 500 TABLET ORAL at 14:10

## 2024-05-21 RX ADMIN — Medication 1000 MG: at 08:57

## 2024-05-21 RX ADMIN — METOPROLOL SUCCINATE 25 MG: 25 TABLET, EXTENDED RELEASE ORAL at 08:57

## 2024-05-21 RX ADMIN — WATER 2000 MG: 1 INJECTION INTRAMUSCULAR; INTRAVENOUS; SUBCUTANEOUS at 14:10

## 2024-05-21 RX ADMIN — WATER 2000 MG: 1 INJECTION INTRAMUSCULAR; INTRAVENOUS; SUBCUTANEOUS at 06:12

## 2024-05-21 RX ADMIN — GABAPENTIN 300 MG: 300 CAPSULE ORAL at 12:10

## 2024-05-21 NOTE — PLAN OF CARE
Problem: Safety - Adult  Goal: Free from fall injury  5/21/2024 0854 by Shante Drake RN  Outcome: Progressing  5/20/2024 2320 by Tim Elkins RN  Outcome: Progressing     Problem: ABCDS Injury Assessment  Goal: Absence of physical injury  5/21/2024 0854 by Shante Drake RN  Outcome: Progressing  5/20/2024 2320 by Tim Elkins RN  Outcome: Progressing     Problem: Discharge Planning  Goal: Discharge to home or other facility with appropriate resources  5/21/2024 0854 by Shante Drake RN  Outcome: Progressing  5/20/2024 2320 by Tim Elkins RN  Outcome: Progressing     Problem: Pain  Goal: Verbalizes/displays adequate comfort level or baseline comfort level  5/21/2024 0854 by Shante Drake RN  Outcome: Progressing  5/20/2024 2320 by Tim Elkins RN  Outcome: Progressing     Problem: Chronic Conditions and Co-morbidities  Goal: Patient's chronic conditions and co-morbidity symptoms are monitored and maintained or improved  5/21/2024 0854 by Shante Drake RN  Outcome: Progressing  5/20/2024 2320 by Tim Elkins RN  Outcome: Progressing

## 2024-05-21 NOTE — CONSULTS
Comprehensive Nutrition Assessment    Type and Reason for Visit:  Initial, Consult, Wound    Nutrition Recommendations/Plan:   Continue current diet  Start ONS; glucerna BID and omaira BID per discussion w/ pt  Will monitor     Malnutrition Assessment:  Malnutrition Status:  No malnutrition (05/21/24 1148)    Context:  Acute Illness     Findings of the 6 clinical characteristics of malnutrition:  Energy Intake:  No significant decrease in energy intake  Weight Loss:  Unable to assess (d/t lack of measured wt hx; hx of CHF noted)     Body Fat Loss:  No significant body fat loss     Muscle Mass Loss:  No significant muscle mass loss    Fluid Accumulation:  No significant fluid accumulation     Strength:  Not Performed    Nutrition Assessment:    Pt adm for wound check d/t diabetic ulcer/osteomyelitis; note hx of R 5th toe amputation;pt now s/p R 4th toe amputation with I&D 5/16. PMHx DM, CHF, AFIB, Depression, CAD, HLD, HTN, CKD, PVD, CABG 12/2020. Pt states appetite has declined slightly but no reports of wt loss. Pt anticipating PICC line placement; Pt at risk d/t increased needs for wound healing. Continue diet. Will start ONS and monitor    Nutrition Related Findings:    +I/Os, A&Ox4, Abd WNL, no edema, hyperglycemia Wound Type: Diabetic Ulcer, Wound Vac       Current Nutrition Intake & Therapies:    Average Meal Intake: %  Average Supplements Intake: None Ordered  ADULT DIET; Regular; 4 carb choices (60 gm/meal)  ADULT ORAL NUTRITION SUPPLEMENT; Breakfast, Lunch; Wound Healing Oral Supplement  ADULT ORAL NUTRITION SUPPLEMENT; Lunch, Dinner; Diabetic Oral Supplement    Anthropometric Measures:  Height: 200.7 cm (6' 7\")  Ideal Body Weight (IBW): 220 lbs (100 kg)    Admission Body Weight: 136.6 kg (301 lb 2.4 oz) (5/21-BS; first measured)  Current Body Weight: 136.6 kg (301 lb 2.4 oz) ([facility] 5/21), 136.9 % IBW. Weight Source: Bed Scale  Current BMI (kg/m2): 33.9  Usual Body Weight: 119.3 kg (263 lb 0.1

## 2024-05-21 NOTE — CARE COORDINATION
Social Work/Case Management Transition of Care Planning ([facility] in [place] [phone]):  Per report and chart review, PT/OT scores noted to be 18/19.  Patient was able to ambulate 15'x2 with FWW SBA.  Wound vac remains in place to right foot.  [facility] referral made on 5/20 for wound vac.  Per Nehemiah of [facility], he still needs measurements of the wound to get approval for the vac.  He has  messaged podiatry without response.  MARINA also messaged podiatry residents via [employer].  Order for PICC is in place.  Patient is now on IV Ancef q8 and oral Flagyl.  Per ID, patient will need IV antibiotics x4 weeks. Script was faxed to [facility].  Per Zamzam of [facility], patient's out of pocket expense will be $256.72/week.   Discharge plan is to home with [facility]. VIRGIL orders are in the chart. LUI/destination completed. CM/SW will follow.  BRITTANY Meyers  5/21/2024    Update:  Discharge order noted.  Met with patient at bedside.  Discussed out of pocket expenses.  Patient is agreeable.  Signed pharmacy agreement was faxed to [facility].  Confirmed with Nehemiah of [facility] that wound vac will be delivered to the patient's home tomorrow. Patient requested shower chair.  Order entered.  No DME preference.  Referral called to [facility].  Shower chair to be delivered to patient prior to discharge.  Patient will discharge after PICC is placed.  Zamzam [facility]right [facility] was notified.  BRITTANY Meyers  5/21/2024

## 2024-05-21 NOTE — PROGRESS NOTES
[facility] Hospitalist Progress Note    Admitting Date and Time: 5/15/2024  5:30 PM  Synopsis: 53 y.o. male with a history of Type 2 DM, HTN, HLD, CKD, CAD, AFib presents with right foot diabetic ulcer. States 5/10 he developed chills, didn't feel well. Had his right foot dressing changed by home health on 5/12 and nurse did not notice any abnormalities, however today his leg was warm with erythema and home health nurse was concerned about foul smelling drainage. He was sent in by podiatrist for IV antibiotics, planned surgery 5/16. Workup in ED significant for glucose 153, WBC 12.1. Blood and wound cultures obtained in ED. Started on vanc/cefepime. Xray right foot and chest done with no acute findings.  Patient underwent debridement of nonviable tissue, bone biopsy fourth digit amputation on 5/16.  Podiatry and ID on board, currently    Subjective:  No new issues overnight  Explained that he is on 75 units of Lantus and high-dose sliding scale, will be transitioning to insulin pump as outpatient    ROS: denies fever, chills, cp, sob, n/v, HA unless stated above.      vancomycin  1,500 mg IntraVENous Q8H    meropenem  1,000 mg IntraVENous Q8H    sodium chloride flush  5-40 mL IntraVENous 2 times per day    sodium chloride flush  5-40 mL IntraVENous 2 times per day    melatonin  6 mg Oral Nightly    vitamin C  1,000 mg Oral Daily    buPROPion  150 mg Oral QAM    busPIRone  15 mg Oral BID    empagliflozin  25 mg Oral Daily    gabapentin  300 mg Oral TID    insulin glargine  30 Units SubCUTAneous BID    metoprolol succinate  25 mg Oral Daily    rosuvastatin  40 mg Oral Nightly    sertraline  50 mg Oral Daily    vitamin D3  5,000 Units Oral Daily    insulin lispro  0-4 Units SubCUTAneous TID WC    insulin lispro  0-4 Units SubCUTAneous Nightly     hydrALAZINE, 10 mg, Q4H PRN  sodium chloride flush, 5-40 mL, PRN  sodium chloride, , PRN  sodium chloride flush, 5-40 mL, PRN  sodium chloride, , PRN  acetaminophen, 
       [facility] Hospitalist Progress Note    Admitting Date and Time: 5/15/2024  5:30 PM  Synopsis: 53 y.o. male with a history of Type 2 DM, HTN, HLD, CKD, CAD, AFib presents with right foot diabetic ulcer. States 5/10 he developed chills, didn't feel well. Had his right foot dressing changed by home health on 5/12 and nurse did not notice any abnormalities, however today his leg was warm with erythema and home health nurse was concerned about foul smelling drainage. He was sent in by podiatrist for IV antibiotics, planned surgery 5/16. Workup in ED significant for glucose 153, WBC 12.1. Blood and wound cultures obtained in ED. Started on vanc/cefepime. Xray right foot and chest done with no acute findings.  Patient underwent debridement of nonviable tissue, bone biopsy fourth digit amputation on 5/16.  Podiatry and ID on board, currently    Subjective:  Resting comfortably, No new issues overnight    ROS: denies fever, chills, cp, sob, n/v, HA unless stated above.      empagliflozin  10 mg Oral Daily    vancomycin  1,500 mg IntraVENous Q8H    meropenem  1,000 mg IntraVENous Q8H    sodium chloride flush  5-40 mL IntraVENous 2 times per day    sodium chloride flush  5-40 mL IntraVENous 2 times per day    melatonin  6 mg Oral Nightly    vitamin C  1,000 mg Oral Daily    buPROPion  150 mg Oral QAM    busPIRone  15 mg Oral BID    gabapentin  300 mg Oral TID    insulin glargine  30 Units SubCUTAneous BID    metoprolol succinate  25 mg Oral Daily    rosuvastatin  40 mg Oral Nightly    sertraline  50 mg Oral Daily    vitamin D3  5,000 Units Oral Daily    insulin lispro  0-4 Units SubCUTAneous TID WC    insulin lispro  0-4 Units SubCUTAneous Nightly     hydrALAZINE, 10 mg, Q4H PRN  sodium chloride flush, 5-40 mL, PRN  sodium chloride, , PRN  sodium chloride flush, 5-40 mL, PRN  sodium chloride, , PRN  acetaminophen, 650 mg, Q6H PRN   Or  acetaminophen, 650 mg, Q6H PRN  oxyCODONE-acetaminophen, 1 tablet, Q8H PRN   
       [facility] Hospitalist Progress Note    SYNOPSIS: Patient admitted on 5/15/2024 for Diabetic ulcer of right foot due to type 2 diabetes mellitus (HCC)     53 y.o. male with a history of Type 2 DM, HTN, HLD, CKD, CAD, AFib presents with right foot diabetic ulcer.  States 5/10 he developed chills, didn't feel well.  Had his right foot dressing changed by home health on  and nurse did not notice any abnormalities, however today his leg was warm with erythema and home health nurse was concerned about foul smelling drainage.  He was sent in by podiatrist for IV antibiotics, planned surgery .  Workup in ED significant for glucose 153, WBC 12.1.  Blood and wound cultures obtained in ED.  Started on vanc/cefepime.  Xray right foot and chest done with no acute findings.     - Debridement of all nonviable tissue, possible bone biopsy and possible fourth digit amputation planned by podiatry today; id consulted  SUBJECTIVE:  Stable overnight. No other overnight issues reported.   Patient seen and examined  Records reviewed.           Temp (24hrs), Av.7 °F (36.5 °C), Min:97 °F (36.1 °C), Max:98.8 °F (37.1 °C)    DIET: ADULT DIET; Regular; 4 carb choices (60 gm/meal)  CODE: Full Code    Intake/Output Summary (Last 24 hours) at 2024 1244  Last data filed at 2024 0900  Gross per 24 hour   Intake 840 ml   Output 20 ml   Net 820 ml       Review of Systems  All bolded are positive; please see HPI  General:  Fever, chills, diaphoresis, fatigue, malaise, night sweats, weight loss  Psychological:  Anxiety, disorientation, hallucinations.  ENT:  Epistaxis, headaches, vertigo, visual changes.  Cardiovascular:  Chest pain, irregular heartbeats, palpitations, paroxysmal nocturnal dyspnea.  Respiratory:  Shortness of breath, coughing, sputum production, hemoptysis, wheezing, orthopnea.  Gastrointestinal:  Nausea, vomiting, diarrhea, heartburn, constipation, abdominal pain, hematemesis, hematochezia, melena, 
       [facility] Hospitalist Progress Note    Admitting Date and Time: 5/15/2024  5:30 PM  Synopsis: 53 y.o. male with a history of Type 2 DM, HTN, HLD, CKD, CAD, AFib presents with right foot diabetic ulcer. He was sent in by podiatrist for IV antibiotics, planned surgery 5/16. Workup in ED significant for glucose 153, WBC 12.1. Blood and wound cultures obtained in ED. Started on vanc/cefepime. Xray right foot and chest done with no acute findings.  Patient underwent debridement of nonviable tissue, bone biopsy fourth digit amputation on 5/16.  Podiatry and ID on board, currently waiting for final cultures.    Subjective:  Seen and examined at bedside, no new issues.     ROS: denies fever, chills, cp, sob, n/v, HA unless stated above.      ceFAZolin  2,000 mg IntraVENous Q8H    metroNIDAZOLE  500 mg Oral 3 times per day    lidocaine PF  5 mL IntraDERmal Once    sodium chloride flush  5-40 mL IntraVENous 2 times per day    heparin flush  3 mL IntraVENous 2 times per day    empagliflozin  10 mg Oral Daily    sodium chloride flush  5-40 mL IntraVENous 2 times per day    sodium chloride flush  5-40 mL IntraVENous 2 times per day    melatonin  6 mg Oral Nightly    vitamin C  1,000 mg Oral Daily    buPROPion  150 mg Oral QAM    busPIRone  15 mg Oral BID    gabapentin  300 mg Oral TID    insulin glargine  30 Units SubCUTAneous BID    metoprolol succinate  25 mg Oral Daily    rosuvastatin  40 mg Oral Nightly    sertraline  50 mg Oral Daily    vitamin D3  5,000 Units Oral Daily    insulin lispro  0-4 Units SubCUTAneous TID WC    insulin lispro  0-4 Units SubCUTAneous Nightly     sodium chloride flush, 5-40 mL, PRN  sodium chloride, , PRN  heparin flush, 3 mL, PRN  hydrALAZINE, 10 mg, Q4H PRN  sodium chloride flush, 5-40 mL, PRN  sodium chloride, , PRN  sodium chloride flush, 5-40 mL, PRN  sodium chloride, , PRN  acetaminophen, 650 mg, Q6H PRN   Or  acetaminophen, 650 mg, Q6H PRN  oxyCODONE-acetaminophen, 1 tablet, Q8H 
  [facility]  [facility]  Progress Note    SUBJECTIVE:  Chief Complaint   Patient presents with    Wound Check     Diabetic ulcer on right foot, Dr Marr told him to come in he would do surgery tomorrow      Patient in bed.  No new complaints.  Patient is tolerating medications. No reported adverse drug reactions.  No nausea, vomiting, diarrhea.    Review of systems:  As stated above in the chief complaint, otherwise negative.    Medications:  Scheduled Meds:   vancomycin  1,500 mg IntraVENous Q8H    meropenem  1,000 mg IntraVENous Q8H    sodium chloride flush  5-40 mL IntraVENous 2 times per day    sodium chloride flush  5-40 mL IntraVENous 2 times per day    melatonin  6 mg Oral Nightly    vitamin C  1,000 mg Oral Daily    buPROPion  150 mg Oral QAM    busPIRone  15 mg Oral BID    empagliflozin  25 mg Oral Daily    gabapentin  300 mg Oral TID    insulin glargine  30 Units SubCUTAneous BID    metoprolol succinate  25 mg Oral Daily    rosuvastatin  40 mg Oral Nightly    sertraline  50 mg Oral Daily    vitamin D3  5,000 Units Oral Daily    insulin lispro  0-4 Units SubCUTAneous TID WC    insulin lispro  0-4 Units SubCUTAneous Nightly     Continuous Infusions:   sodium chloride      sodium chloride      dextrose       PRN Meds:hydrALAZINE, sodium chloride flush, sodium chloride, sodium chloride flush, sodium chloride, acetaminophen **OR** acetaminophen, oxyCODONE-acetaminophen **AND** oxyCODONE, glucose, dextrose bolus **OR** dextrose bolus, glucagon (rDNA), dextrose    OBJECTIVE:  /60   Pulse 71   Temp 97.3 °F (36.3 °C) (Temporal)   Resp 18   Ht 2.007 m (6' 7\")   Wt 113.4 kg (250 lb)   SpO2 95%   BMI 28.16 kg/m²   Temp  Av.3 °F (36.3 °C)  Min: 97.3 °F (36.3 °C)  Max: 97.3 °F (36.3 °C)  Constitutional: No acute distress  Skin: Warm and dry. No rashes were noted.   Neuro: Alert and oriented  HEENT: Round and reactive pupils.  Moist mucous membranes.  No ulcerations or 
  [facility]  [facility]  Progress Note    SUBJECTIVE:  Chief Complaint   Patient presents with    Wound Check     Diabetic ulcer on right foot, Dr Marr told him to come in he would do surgery tomorrow      Patient is tolerating medications. No reported adverse drug reactions.  No nausea, vomiting, diarrhea.  Patient resting in bed, feeling well, denies pain  No fevers overnight     Review of systems:  As stated above in the chief complaint, otherwise negative.    Medications:  Scheduled Meds:   empagliflozin  10 mg Oral Daily    vancomycin  1,500 mg IntraVENous Q8H    meropenem  1,000 mg IntraVENous Q8H    sodium chloride flush  5-40 mL IntraVENous 2 times per day    sodium chloride flush  5-40 mL IntraVENous 2 times per day    melatonin  6 mg Oral Nightly    vitamin C  1,000 mg Oral Daily    buPROPion  150 mg Oral QAM    busPIRone  15 mg Oral BID    gabapentin  300 mg Oral TID    insulin glargine  30 Units SubCUTAneous BID    metoprolol succinate  25 mg Oral Daily    rosuvastatin  40 mg Oral Nightly    sertraline  50 mg Oral Daily    vitamin D3  5,000 Units Oral Daily    insulin lispro  0-4 Units SubCUTAneous TID WC    insulin lispro  0-4 Units SubCUTAneous Nightly     Continuous Infusions:   sodium chloride      sodium chloride      dextrose       PRN Meds:hydrALAZINE, sodium chloride flush, sodium chloride, sodium chloride flush, sodium chloride, acetaminophen **OR** acetaminophen, oxyCODONE-acetaminophen **AND** oxyCODONE, glucose, dextrose bolus **OR** dextrose bolus, glucagon (rDNA), dextrose    OBJECTIVE:  /80   Pulse 74   Temp 97.8 °F (36.6 °C) (Temporal)   Resp 16   Ht 2.007 m (6' 7\")   Wt 113.4 kg (250 lb)   SpO2 97%   BMI 28.16 kg/m²   Temp  Av.2 °F (36.8 °C)  Min: 97.8 °F (36.6 °C)  Max: 98.6 °F (37 °C)  Constitutional: The patient is awake, alert, and oriented.  Resting in bed.  In no apparent distress.  Skin: Warm and dry. No rashes were noted.   HEENT: 
  Nurse to Nurse report called  Transport requested  
  [facility]  [facility]  Progress Note    SUBJECTIVE:  Chief Complaint   Patient presents with    Wound Check     Diabetic ulcer on right foot, Dr Marr told him to come in he would do surgery tomorrow      Patient is tolerating medications. No reported adverse drug reactions.  No nausea, vomiting, diarrhea.  Patient resting in bed, feeling well, denies pain  No fevers overnight     Review of systems:  As stated above in the chief complaint, otherwise negative.    Medications:  Scheduled Meds:   ceFAZolin  2,000 mg IntraVENous Q8H    metroNIDAZOLE  500 mg Oral 3 times per day    lidocaine PF  5 mL IntraDERmal Once    sodium chloride flush  5-40 mL IntraVENous 2 times per day    heparin flush  3 mL IntraVENous 2 times per day    empagliflozin  10 mg Oral Daily    sodium chloride flush  5-40 mL IntraVENous 2 times per day    sodium chloride flush  5-40 mL IntraVENous 2 times per day    melatonin  6 mg Oral Nightly    vitamin C  1,000 mg Oral Daily    buPROPion  150 mg Oral QAM    busPIRone  15 mg Oral BID    gabapentin  300 mg Oral TID    insulin glargine  30 Units SubCUTAneous BID    metoprolol succinate  25 mg Oral Daily    rosuvastatin  40 mg Oral Nightly    sertraline  50 mg Oral Daily    vitamin D3  5,000 Units Oral Daily    insulin lispro  0-4 Units SubCUTAneous TID WC    insulin lispro  0-4 Units SubCUTAneous Nightly     Continuous Infusions:   sodium chloride      sodium chloride      sodium chloride      dextrose       PRN Meds:sodium chloride flush, sodium chloride, heparin flush, hydrALAZINE, sodium chloride flush, sodium chloride, sodium chloride flush, sodium chloride, acetaminophen **OR** acetaminophen, oxyCODONE-acetaminophen **AND** oxyCODONE, glucose, dextrose bolus **OR** dextrose bolus, glucagon (rDNA), dextrose    OBJECTIVE:  /67   Pulse 68   Temp 97.7 °F (36.5 °C) (Temporal)   Resp 16   Ht 2.007 m (6' 7\")   Wt 113.4 kg (250 lb)   SpO2 96%   BMI 28.16 
4 Eyes Skin Assessment     NAME:  Dejan Patino  YOB: 1970  MEDICAL RECORD NUMBER:  [id number]    The patient is being assessed for  Admission    I agree that at least one RN has performed a thorough Head to Toe Skin Assessment on the patient. ALL assessment sites listed below have been assessed.      Areas assessed by both nurses:    Head, Face, Ears, Shoulders, Back, Chest, Arms, Elbows, Hands, Sacrum. Buttock, Coccyx, Ischium, Legs. Feet and Heels, Under Medical Devices , and Other ***      Patient ha old healed wound on right shin. A diabetic foot ulcer on the right plantar foot 3 x 3.5 x 0.2cm. Patient refused to allow us to look at his buttock.  Does the Patient have a Wound? Yes wound(s) were present on assessment. LDA wound assessment was Initiated and completed by RN       José Miguel Prevention initiated by RN: Yes  Wound Care Orders initiated by RN: Yes    Pressure Injury (Stage 3,4, Unstageable, DTI, NWPT, and Complex wounds) if present, place Wound referral order by RN under : No    New Ostomies, if present place, Ostomy referral order under : No     Nurse 1 eSignature: Electronically signed by Mary Zaman RN on 5/16/24 at 3:45 AM EDT    **SHARE this note so that the co-signing nurse can place an eSignature**    Nurse 2 eSignature: Electronically signed by Marizol Chaudhari RN on 5/16/24 at 6:51 AM EDT  
BS- 147  
Blood Sugar - 131 pre-breakfast   Blood sugar- 166 per dexicom Left  arm, post meal   
Blood sugar per patient's monitor was 118.  
Blood sugar- 159  
Blood sugar- 174 post meal   
Blood sugar- 201  
Blood sugar- 250  
Called [facility] ([phone]) for wound vacc ordered,to be place tomorrow. They will deliver in the room later today.   
Department of Podiatry   Progress Note        Patient seen and evaluated at bedside this morning. He is s/p right 4th digit amputation and open fasciotomy of lateral forefoot 5/16/24.No acute events.  He has been doing well and states that he has no pain whatsoever in his legs.  He is curious about the wound VAC.  And states that what ever needs to be done to help his foot he is willing to do. Patient denies any N/V/D/SOB/CP and has no other pedal complaints at this time.       Past Medical History:        Diagnosis Date    Ankle ulcer, left, limited to breakdown of skin ([facility]) 9/18/2023    Anxiety     Atrial fibrillation, unspecified type ([facility])     CAD (coronary artery disease)     Cardiomyopathy ([facility])     CKD (chronic kidney disease)     Decreased dorsalis pedis pulse 9/18/2023    Depression     HTN (hypertension)     Hyperlipidemia     LBBB (left bundle branch block)     PVD (peripheral vascular disease) ([facility]) 9/18/2023    Type 2 diabetes mellitus without complication ([facility])     Varicose veins of bilateral lower extremities with other complications 9/18/2023    Mild, bilateral below the knee noted       Past Surgical History:        Procedure Laterality Date    CARDIAC SURGERY      CORONARY ARTERY BYPASS GRAFT  12/2020    EYE LID SURGERY      FOOT DEBRIDEMENT Right 5/16/2024    RIGHT FOOT DEBRIDEMENT OF ALL NONVIABLE TISSUE, FOURTH TOE AMPUTATION performed by Romeo Marr DPM at [facility] [place]    FOOT SURGERY Bilateral     FRACTURE SURGERY      OTHER SURGICAL HISTORY      atrial clip-has card in wallet    VEIN SURGERY         Medications Prior to Admission:    Medications Prior to Admission: oxyCODONE-acetaminophen (PERCOCET) 7.5-325 MG per tablet, Take 1 tablet by mouth 2 times daily as needed for Pain for up to 30 days.  buPROPion (WELLBUTRIN XL) 150 MG extended release tablet, Take 1 tablet by mouth every morning  busPIRone (BUSPAR) 15 MG tablet, TAKE 1 TABLET BY MOUTH TWICE DAILY  empagliflozin (JARDIANCE) 25 MG 
Department of Podiatry   Progress Note        Patient seen and evaluated at bedside this morning. He is s/p right 4th digit amputation and open fasciotomy of lateral forefoot 5/16/24.No acute events.  Patient is doing well, and states that he is having no problem at this time.  He is not having a lot of pain.  He is disappointed that he is infected again, and that he is lost 3 toes in the past 5 years.  He states he does not understand how this keeps happening.  Patient denies any N/V/D/SOB/CP and has no other pedal complaints at this time.       Past Medical History:        Diagnosis Date    Ankle ulcer, left, limited to breakdown of skin ([facility]) 9/18/2023    Anxiety     Atrial fibrillation, unspecified type ([facility])     CAD (coronary artery disease)     Cardiomyopathy ([facility])     CKD (chronic kidney disease)     Decreased dorsalis pedis pulse 9/18/2023    Depression     HTN (hypertension)     Hyperlipidemia     LBBB (left bundle branch block)     PVD (peripheral vascular disease) ([facility]) 9/18/2023    Type 2 diabetes mellitus without complication ([facility])     Varicose veins of bilateral lower extremities with other complications 9/18/2023    Mild, bilateral below the knee noted       Past Surgical History:        Procedure Laterality Date    CARDIAC SURGERY      CORONARY ARTERY BYPASS GRAFT  12/2020    EYE LID SURGERY      FOOT DEBRIDEMENT Right 5/16/2024    RIGHT FOOT DEBRIDEMENT OF ALL NONVIABLE TISSUE, FOURTH TOE AMPUTATION [place] [facility] [place]    FOOT SURGERY Bilateral     FRACTURE SURGERY      OTHER SURGICAL HISTORY      atrial clip-has card in wallet    VEIN SURGERY         Medications Prior to Admission:    Medications Prior to Admission: oxyCODONE-acetaminophen (PERCOCET) 7.5-325 MG per tablet, Take 1 tablet by mouth 2 times daily as needed for Pain for up to 30 days.  buPROPion (WELLBUTRIN XL) 150 MG extended release tablet, Take 1 tablet by mouth every morning  busPIRone (BUSPAR) 15 MG tablet, 
Department of Podiatry   Progress Note        Patient seen and evaluated at bedside this morning. He is s/p right 4th digit amputation and open fasciotomy of lateral forefoot 5/16/24.No acute events. Patient denies any N/V/D/SOB/CP and has no other pedal complaints at this time.       Past Medical History:        Diagnosis Date    Ankle ulcer, left, limited to breakdown of skin ([facility]) 9/18/2023    Anxiety     Atrial fibrillation, unspecified type ([facility])     CAD (coronary artery disease)     Cardiomyopathy ([facility])     CKD (chronic kidney disease)     Decreased dorsalis pedis pulse 9/18/2023    Depression     HTN (hypertension)     Hyperlipidemia     LBBB (left bundle branch block)     PVD (peripheral vascular disease) ([facility]) 9/18/2023    Type 2 diabetes mellitus without complication ([facility])     Varicose veins of bilateral lower extremities with other complications 9/18/2023    Mild, bilateral below the knee noted       Past Surgical History:        Procedure Laterality Date    CARDIAC SURGERY      CORONARY ARTERY BYPASS GRAFT  12/2020    EYE LID SURGERY      FOOT DEBRIDEMENT Right 5/16/2024    RIGHT FOOT DEBRIDEMENT OF ALL NONVIABLE TISSUE, FOURTH TOE AMPUTATION performed by Romeo Marr DPM at [facility] [place]    FOOT SURGERY Bilateral     FRACTURE SURGERY      OTHER SURGICAL HISTORY      atrial clip-has card in wallet    VEIN SURGERY         Medications Prior to Admission:    Medications Prior to Admission: oxyCODONE-acetaminophen (PERCOCET) 7.5-325 MG per tablet, Take 1 tablet by mouth 2 times daily as needed for Pain for up to 30 days.  buPROPion (WELLBUTRIN XL) 150 MG extended release tablet, Take 1 tablet by mouth every morning  busPIRone (BUSPAR) 15 MG tablet, TAKE 1 TABLET BY MOUTH TWICE DAILY  empagliflozin (JARDIANCE) 25 MG tablet, Indications: This medication can cause dehydration if blood sugars are not controlled. TAKE 1 TABLET BY MOUTH ONCE DAILY FOR 90 DAYS  gabapentin (NEURONTIN) 300 MG capsule, Take 1 capsule by 
ID consult called   
Infectious Disease  Progress Note  [facility]    Chief Complaint: none    Subjective: right foot diabetic infection    Scheduled Meds:   vancomycin  1,500 mg IntraVENous Q8H    meropenem  1,000 mg IntraVENous Q8H    sodium chloride flush  5-40 mL IntraVENous 2 times per day    sodium chloride flush  5-40 mL IntraVENous 2 times per day    melatonin  6 mg Oral Nightly    vitamin C  1,000 mg Oral Daily    buPROPion  150 mg Oral QAM    busPIRone  15 mg Oral BID    empagliflozin  25 mg Oral Daily    gabapentin  300 mg Oral TID    insulin glargine  30 Units SubCUTAneous BID    metoprolol succinate  25 mg Oral Daily    rosuvastatin  40 mg Oral Nightly    sertraline  50 mg Oral Daily    vitamin D3  5,000 Units Oral Daily    insulin lispro  0-4 Units SubCUTAneous TID WC    insulin lispro  0-4 Units SubCUTAneous Nightly     Continuous Infusions:   sodium chloride      sodium chloride      dextrose       PRN Meds:hydrALAZINE, sodium chloride flush, sodium chloride, sodium chloride flush, sodium chloride, acetaminophen **OR** acetaminophen, oxyCODONE-acetaminophen **AND** oxyCODONE, glucose, dextrose bolus **OR** dextrose bolus, glucagon (rDNA), dextrose    Patient Vitals for the past 24 hrs:   BP Temp Temp src Pulse Resp SpO2   05/17/24 0830 118/64 -- -- 80 -- --   05/17/24 0759 118/64 97 °F (36.1 °C) Temporal 80 18 98 %   05/17/24 0530 118/67 97.3 °F (36.3 °C) Temporal 75 17 97 %   05/16/24 2215 -- 97.6 °F (36.4 °C) Temporal -- 16 --   05/16/24 2143 (!) 111/57 98.2 °F (36.8 °C) Temporal 84 16 96 %   05/16/24 2130 (!) 101/55 -- -- 84 16 95 %   05/16/24 2122 107/60 97 °F (36.1 °C) -- 86 16 95 %   05/16/24 1930 131/72 98.8 °F (37.1 °C) Temporal 87 18 97 %       CBC with Differential:    Lab Results   Component Value Date/Time    WBC 9.4 05/16/2024 05:13 AM    RBC 4.40 05/16/2024 05:13 AM    HGB 12.8 05/16/2024 05:13 AM    HCT 38.3 05/16/2024 05:13 AM     05/16/2024 05:13 AM    MCV 87.0 05/16/2024 05:13 AM    MCH 29.1 
Messaged IV team about needing new IV. Patient had me take out his IV he has had for few days was in a lot of pain from antibiotic use. Will notify day shift nurse.   
OCCUPATIONAL THERAPY INITIAL EVALUATION    [facility] [address], [place]       Date:2024                                                  Patient Name: Dejan Patino  MRN: [id number]  : 1970  Room: [facility]8423    Evaluating OT: Butch Werner OTR/L [id number]    Referring Provider: Morenita Perera DO      Specific Provider Orders/Date: OT evaluation and treatment 5/15/24 1930    Diagnosis:  Cellulitis of right lower extremity [L03.115]  Diabetic ulcer of right foot due to type 2 diabetes mellitus (HCC) [E11.621, L97.519]  Diabetic ulcer of right foot associated with type 1 diabetes mellitus, limited to breakdown of skin, unspecified part of foot (HCC) [E10.621, L97.511]      Pertinent Medical History:  has a past medical history of Ankle ulcer, left, limited to breakdown of skin (HCC), Anxiety, Atrial fibrillation, unspecified type (HCC), CAD (coronary artery disease), Cardiomyopathy (HCC), CKD (chronic kidney disease), Decreased dorsalis pedis pulse, Depression, HTN (hypertension), Hyperlipidemia, LBBB (left bundle branch block), PVD (peripheral vascular disease) (HCC), Type 2 diabetes mellitus without complication (HCC), and Varicose veins of bilateral lower extremities with other complications.   Past Surgical History:   Procedure Laterality Date    CARDIAC SURGERY      CORONARY ARTERY BYPASS GRAFT  2020    EYE LID SURGERY      FOOT DEBRIDEMENT Right 2024    RIGHT FOOT DEBRIDEMENT OF ALL NONVIABLE TISSUE, FOURTH TOE AMPUTATION performed by Romeo Marr DPM at [facility] [place], [place] OR    FOOT SURGERY Bilateral     FRACTURE SURGERY      OTHER SURGICAL HISTORY      atrial clip-has card in wallet    VEIN SURGERY         Pt admitted to the hospital 5/15 with R foot wound     Orders received, chart reviewed, eval complete.     Precautions:  Fall Risk, NWBing RLE, wound vac    Assessment of current deficits   [x] Functional mobility  
Occupational Therapy  OT SESSION ATTEMPT     Date:2024  Patient Name: Dejan Patino  MRN: [id number]  : 1970  Room: [address] [place], [place] [address]     Attempted OT session this date:    [] unavailable due to other medical staff currently with pt   [x] on hold - per podiatry, plan for Debridement of all nonviable tissue, possible bone biopsy and possible fourth digit amputation this date   [] on hold per nursing staff secondary to lab / radiology results    [] declined treatment  this date due to ____.  Benefits of participation in therapy reviewed with pt.    [] off unit   [] Other:     Will reattempt OT eval at a later time.    Carissa Humphrey, OTR/L #[id number]    
Occupational Therapy  Received OT order, Reviewed Chart.  Noted Order from Podiatry for Bedrest/NWB R LE.  Presented to pt's room for interview - pt reported \"I've been there and done that - I've had other toes amputated before this one.\"  Reportedly has WW, Son recently purchased Knee Scooter.  Lives alone INDly w/o the use of any DME/AD/AE but reported adult children will provide assist w/ IADLs after d/c.  Will attempt OT assessment when ax level is increased by Podiatry.  Thank you for this referral. Chela Kuo, [facility], OTR/L  # [phone]      
Patient is requesting changes of sliding scale Humalog.   
Patient refused finger sticks for BG, Uses arm one. BG on phone shows 315.  
Patient to  PACU & placed on appropriate monitors.   Cart low, locked with siderails up.    
Patient's blood sugar 131 per monitor.  
Pharmacy Consultation Note  (Antibiotic Dosing and Monitoring)    Initial consult date: 5/15/24  Consulting physician/provider: Dr. Perera  Drug: Vancomycin  Indication: Bone and Joint Infection    Age/  Gender Height Weight IBW  Allergy Information   53 y.o./male  6' 7\" 113.4 kg (250 lb)     Ideal body weight: 93.7 kg (206 lb 9.1 oz)  Adjusted ideal body weight: 101.6 kg (223 lb 15.1 oz)   Patient has no known allergies.      Renal Function:  No results for input(s): \"BUN\", \"CREATININE\" in the last 72 hours.  No intake or output data in the 24 hours ending 05/15/24 1927    Vancomycin Monitoring:  Trough:  No results for input(s): \"VANCOTROUGH\" in the last 72 hours.  Random:  No results for input(s): \"VANCORANDOM\" in the last 72 hours.      Vancomycin Administration Times:  Recent vancomycin administrations        No vancomycin IV orders with administrations found.            Orders not given:            vancomycin (VANCOCIN) 2,500 mg in sodium chloride 0.9 % 500 mL IVPB                    Assessment:  Patient is a 53 y.o. male who has been initiated on vancomycin  Estimated Creatinine Clearance: 123 mL/min (based on SCr of 1 mg/dL).  To dose vancomycin, pharmacy will be utilizing [employer] calculation software for goal AUC/SHAN 400-600 mg/L-hr (predicted AUC/SHAN = 538, Tr =16.8 mcg/mL)    Plan:  Will give 2500 mg load followed by vancomycin 1750 mg IV every 12 hours  Will check vancomycin levels when appropriate  Will continue to monitor renal function   Pharmacy to follow    Thank you for your consult    Rafael Calixto PharmD BCPS 5/15/2024 10:39 PM      
Pharmacy Consultation Note  (Antibiotic Dosing and Monitoring)    Initial consult date: 5/15/24  Consulting physician/provider: Dr. Perera  Drug: Vancomycin  Indication: Bone and Joint Infection    Age/  Gender Height Weight IBW  Allergy Information   53 y.o./male 200.7 cm (6' 7\")6' 7\" 113.4 kg (250 lb)     Ideal body weight: 93.7 kg (206 lb 9.1 oz)  Adjusted ideal body weight: 101.6 kg (223 lb 15.1 oz)   Patient has no known allergies.      Renal Function:  Recent Labs     05/15/24  1841 05/16/24  0513   BUN 22* 16   CREATININE 1.0 0.7     No intake or output data in the 24 hours ending 05/16/24 0803    Vancomycin Monitoring:  Trough:  No results for input(s): \"VANCOTROUGH\" in the last 72 hours.  Random:    Recent Labs     05/16/24 0513   VANCORANDOM 12.1       Vancomycin Administration Times:  Recent vancomycin administrations                     vancomycin (VANCOCIN) 2,500 mg in sodium chloride 0.9 % 500 mL IVPB (mg) 2,500 mg New Bag 05/15/24 9029                        Assessment:  Patient is a 53 y.o. male who has been initiated on vancomycin  Estimated Creatinine Clearance: 175 mL/min (based on SCr of 0.7 mg/dL).  To dose vancomycin, pharmacy will be utilizing [employer] calculation software for goal AUC/SHAN 400-600 mg/L-hr (predicted AUC/SHAN = 485, Tr =14.3 mcg/mL)    Plan:  Change to vancomycin 1500 mg IV every 8 hours  Will check vancomycin levels tomorrow AM  Will continue to monitor renal function   Pharmacy to follow    Thank you for your consult    Vaughn Ramirez, PharmTITO 5/16/2024 8:05 AM        
Pharmacy Consultation Note  (Antibiotic Dosing and Monitoring)    Initial consult date: 5/15/24  Consulting physician/provider: Dr. Perera  Drug: Vancomycin  Indication: Bone and Joint Infection    Age/  Gender Height Weight IBW  Allergy Information   53 y.o./male 200.7 cm (6' 7\")6' 7\" 113.4 kg (250 lb)     Ideal body weight: 93.7 kg (206 lb 9.1 oz)  Adjusted ideal body weight: 101.6 kg (223 lb 15.1 oz)   Patient has no known allergies.      Renal Function:  Recent Labs     05/15/24  1841 05/16/24  0513 05/17/24  0755   BUN 22* 16  --    CREATININE 1.0 0.7 0.7         Intake/Output Summary (Last 24 hours) at 5/18/2024 0645  Last data filed at 5/17/2024 1254  Gross per 24 hour   Intake 720 ml   Output --   Net 720 ml         Vancomycin Monitoring:  Trough:    Recent Labs     05/17/24  0755   VANCOTROUGH 14.3       Random:    Recent Labs     05/16/24  0513   VANCORANDOM 12.1       Vancomycin Administration Times:  Recent vancomycin administrations                     vancomycin (VANCOCIN) 1500 mg in sodium chloride 0.9 % 250 mL IVPB (mg) 1,500 mg New Bag 05/17/24 0839     1,500 mg New Bag  0145     1,500 mg New Bag 05/16/24 1711     1,500 mg New Bag  1042    vancomycin (VANCOCIN) 2,500 mg in sodium chloride 0.9 % 500 mL IVPB (mg) 2,500 mg New Bag 05/15/24 2329                      Assessment:  Patient is a 53 y.o. male who has been initiated on vancomycin  Estimated Creatinine Clearance: 175 mL/min (based on SCr of 0.7 mg/dL).  To dose vancomycin, pharmacy will be utilizing [employer]   5/17: Vancomycin level = 14.3 mcg/ml.     Plan:  Continue vancomycin 1500 mg IV every 8 hours    Thank you for your consult  Val Rivera, CarlineD, BCPS, BCCCP 5/18/2024 6:45 AM           
Pharmacy Consultation Note  (Antibiotic Dosing and Monitoring)    Initial consult date: 5/15/24  Consulting physician/provider: Dr. Perera  Drug: Vancomycin  Indication: Bone and Joint Infection    Age/  Gender Height Weight IBW  Allergy Information   53 y.o./male 200.7 cm (6' 7\")6' 7\" 113.4 kg (250 lb)     Ideal body weight: 93.7 kg (206 lb 9.1 oz)  Adjusted ideal body weight: 101.6 kg (223 lb 15.1 oz)   Patient has no known allergies.      Renal Function:  Recent Labs     05/15/24  1841 05/16/24  0513 05/17/24  0755   BUN 22* 16  --    CREATININE 1.0 0.7 0.7       Intake/Output Summary (Last 24 hours) at 5/17/2024 1019  Last data filed at 5/16/2024 2232  Gross per 24 hour   Intake 600 ml   Output 20 ml   Net 580 ml       Vancomycin Monitoring:  Trough:    Recent Labs     05/17/24  0755   VANCOTROUGH 14.3     Random:    Recent Labs     05/16/24  0513   VANCORANDOM 12.1       Vancomycin Administration Times:  Recent vancomycin administrations                     vancomycin (VANCOCIN) 1500 mg in sodium chloride 0.9 % 250 mL IVPB (mg) 1,500 mg New Bag 05/17/24 0839     1,500 mg New Bag  0145     1,500 mg New Bag 05/16/24 1711     1,500 mg New Bag  1042    vancomycin (VANCOCIN) 2,500 mg in sodium chloride 0.9 % 500 mL IVPB (mg) 2,500 mg New Bag 05/15/24 2329                      Assessment:  Patient is a 53 y.o. male who has been initiated on vancomycin  Estimated Creatinine Clearance: 175 mL/min (based on SCr of 0.7 mg/dL).  To dose vancomycin, pharmacy will be utilizing [employer]   5/17: Vancomycin level = 14.3 mcg/ml.     Plan:  Continue vancomycin 1500 mg IV every 8 hours  Will check vancomycin levels when needed  Will continue to monitor renal function   Pharmacy to follow    Thank you for your consult    Jennyfer Cabrales, PharmD, BCPS 5/17/2024 10:24 AM          
Pharmacy Consultation Note  (Antibiotic Dosing and Monitoring)    Initial consult date: 5/15/24  Consulting physician/provider: Dr. Perera  Drug: Vancomycin  Indication: Bone and Joint Infection    Age/  Gender Height Weight IBW  Allergy Information   53 y.o./male 200.7 cm (6' 7\")6' 7\" 113.4 kg (250 lb)     Ideal body weight: 93.7 kg (206 lb 9.1 oz)  Adjusted ideal body weight: 101.6 kg (223 lb 15.1 oz)   Patient has no known allergies.      Renal Function:  Recent Labs     05/17/24  0755 05/18/24  0835   BUN  --  11   CREATININE 0.7 0.7         Intake/Output Summary (Last 24 hours) at 5/19/2024 0657  Last data filed at 5/19/2024 0643  Gross per 24 hour   Intake 600 ml   Output 20 ml   Net 580 ml         Vancomycin Monitoring:  Trough:    Recent Labs     05/17/24  0755   VANCOTROUGH 14.3       Random:  No results for input(s): \"VANCORANDOM\" in the last 72 hours.    Vancomycin Administration Times:  Recent vancomycin administrations                     vancomycin (VANCOCIN) 1500 mg in sodium chloride 0.9 % 250 mL IVPB (mg) 1,500 mg New Bag 05/17/24 0839     1,500 mg New Bag  0145     1,500 mg New Bag 05/16/24 1711     1,500 mg New Bag  1042    vancomycin (VANCOCIN) 2,500 mg in sodium chloride 0.9 % 500 mL IVPB (mg) 2,500 mg New Bag 05/15/24 2329                      Assessment:  Patient is a 53 y.o. male who has been initiated on vancomycin  Estimated Creatinine Clearance: 175 mL/min (based on SCr of 0.7 mg/dL).  To dose vancomycin, pharmacy will be utilizing [email or web address]   5/17: Vancomycin level = 14.3 mcg/ml.     Plan:  Continue vancomycin 1500 mg IV every 8 hours    Thank you for your consult  Val Rivera, PharmD, BCPS, BCCCP 5/19/2024 6:57 AM           
Physical Therapy  Physical Therapy Initial Assessment     Name: Dejan Patino  : 1970  MRN: [id number]      Date of Service: 2024    Evaluating PT:  Elvis Aleman PT, DPT    Room #:  8423/8423-B  Diagnosis:  Cellulitis of right lower extremity [L03.115]  Diabetic ulcer of right foot due to type 2 diabetes mellitus ([facility]) [E11.621, L97.519]  Diabetic ulcer of right foot associated with type 1 diabetes mellitus, limited to breakdown of skin, unspecified part of foot ([facility]) [E10.621, L97.511]  PMHx/PSHx:  anxiety, afib, CAD, CKD, HTN, HLD, DM II  Procedure/Surgery:  s/p R 4th toe and partial 5th ray amputation, I&D, open fasciotomy   Precautions:  NWB RLE, fall risk, wound vac  Equipment Owned: knee scooter, ww  Equipment Needs:  TBD    SUBJECTIVE:    Pt lives alone in a 1 story home with 3 steps to enter and B handrail.  Bed/bath is on 1st floor.  Pt ambulated with no AD PTA.    OBJECTIVE:   Initial Evaluation  Date: 24 Treatment Short Term/ Long Term   Goals   AM-PAC 6 Clicks      Was pt agreeable to Eval/treatment? yes     Does pt have pain? 10 R foot     Bed Mobility  Rolling: IND  Supine to sit: IND  Sit to supine: IND  Scooting: IND     Transfers Sit to stand: SBA  Stand to sit: SBA  Stand pivot: SBA with ww  Sit to stand: mod I  Stand to sit: mod I  Stand pivot: mod I with AAD   Ambulation    15'x2 with ww SBA  50'+ with AAD mod I   Stair negotiation: ascended and descended  NT  3 steps with B handrail mod I     Strength/ROM:   BLE grossly 4/5  BLE AROM WFL    Balance:   Static Sitting: IND  Dynamic Sitting: IND  Static Standing: Supervision with ww  Dynamic Standing: SBA with ww    Pt is A & O x 3  Sensation:  Pt denies numbness and tingling to extremities  Edema:  unremarkable    Vitals:  SpO2 and HR were stable during session    Therapeutic Exercises:    Bed mobility: supine<>sit, cued for EOB positioning  Transfers: STS x2, cued for hand placement and postural 
Pt wound vac dressing taken off and wet to dry done. Skin on his edges white with wound bed pink and 2 stitches noticed.   Taxi voucher given , exhausted taxi voucher supply.     
Vancomycin has been discontinued   Clinical Pharmacy to sign-off  Physician to re-consult pharmacy if future dosing is needed    Thank you for the consult,  Ron Hale, CarlineD, [facility] 5/20/2024 2:25 PM    
Vascular Access Procedure Note    Procedure Date:   5/21/2024    Pre-procedure Verification/Time-Out:  The proposed risks versus benefits of this procedure were discussed in detail by the physician with patient. written consent was obtained from the patient. Relevant documentation was reviewed prior to procedure including signed consent form and medications.   All necessary equipment for procedure is available at time of procedure yes.  An audible time out was done at 1600PM by team members, correctly identifying patients name, medical record number, correct side, correct site, and correct procedure to be performed with registered nurse members of the procedure team all in agreement.        Indication for Procedure:   Reason for Insertion: intravenous antibiotics    ASA Assessment (Required for Moderate & Deep Sedation):      Procedure:   Reason for Consultation: power PICC line    Clinician Performing Procedure:   KEVIN Cross    Assistant:  none    Sedation:   Analgesia Used: lidocaine 1%    Procedure Details/Findings:  Catheter Swedish Size: 4.5  Lot Number: [id number]  Product #: [id number]  Expiration Date: 10/31/2024  Maximum Barrier Precautions: cap, eye shield, full body drape, gloves, gown, handwashing, and mask  Skin Prep: chlorhexidine  Technique: modified seldinger and ultrasound guided with VPS  Attempts: 1  Exposed (cm): 1  Total (cm): 43  Placement Site: right brachial vein.  Vessel Size: 0.55  Dressing: securement device, transparent dressing, and biopatch  Blood Return: Yes   Ultrasound Guidance: Yes   Arm Circumference Mid-Bicep (cm): 27  Chest X-Ray Ordered: VasoNova VPS  End Placement: caj/lower svc    Complications:   none     Post-operative Condition:  stable  Patient Tolerated Procedure: well     Comments/Post-operative Education:   Post Procedure Interventions: no blood pressure sign placed above bed    Hernan Blanco RN  05/21/24  3:39 PM     
Wound vac taken in clear plastic bag to haseeb and handed to a tech. Other parts disposed in red bags in dirty utility room.  
Wound vac will not be available until 5/22. Podiatry resident updated and wet to dry dressing is ok until wound vac is applied 5/22 by home health  
Joint pain, joint stiffness, joint swelling, muscle pain  Neurology:  Headache, focal neurological deficits, weakness, numbness, paresthesia  Derm:  Rashes, ulcers, excoriations, bruising  Extremities:  Decreased ROM, peripheral edema, mottling      OBJECTIVE:    /64   Pulse 85   Temp 98.1 °F (36.7 °C) (Temporal)   Resp 18   Ht 2.007 m (6' 7\")   Wt 113.4 kg (250 lb)   SpO2 95%   BMI 28.16 kg/m²     General appearance:  awake, alert, and oriented to person, place, time, and purpose; appears stated age and cooperative; no apparent distress no labored breathing  HEENT:  Conjunctivae/corneas clear.   Neck: Supple. No jugular venous distention.   Respiratory: symmetrical; clear to auscultation bilaterally; no wheezes; no rhonchi; no rales  Cardiovascular: rhythm regular; rate controlled; no murmurs  Abdomen: Soft, nontender, nondistended  Extremities:  peripheral pulses present; no peripheral edema; no ulcers  Musculoskeletal: No clubbing, cyanosis, no bilateral lower extremity edema. Brisk capillary refill.   Skin:  No rashes  on visible skin  Neurologic: awake, alert and following commands     ASSESSMENT and PLAN:    Diabetic ulcer right foot  - vanc/cefepime  - f/u culture results  - podiatry c/s  -ID consult  - Debridement of all nonviable tissue, possible bone biopsy and possible fourth digit amputation today     Type 2 DM  Neuropathy  - SSI  - hypoglycemia protocol  - continue home lantus at reduced dose while NPO  - reports noncompliance with diet, states he orders pizza while inpatient  - continue gabapentin     HTN  HLD  CHF  PVD  - statin  - prn hydralazine   -ordered A1c; lipid panel and tsh    Anxiety  - continue home psych meds              DVT Prophylaxis [] Lovenox, []  Heparin, [] SCDs, [] Ambulation   GI Prophylaxis [] PPI,  [] H2 Blocker,  [] Carafate,  [] Diet/Tube Feeds   Disposition Patient requires continued admission due to pending id recs and podiatry recs   MDM [] Low, [] Moderate,[] 
or thrush.  Chest: .Respirations unlabored. Breath sounds clear.   Cardiovascular: RRR  Abdomen: Soft.  Bowel sounds present.  Extremities: Right foot wrapped.    Lines: PIV      Laboratory and Tests:  Lab Results   Component Value Date    WBC 5.9 05/18/2024    WBC 9.4 05/16/2024    WBC 12.1 (H) 05/15/2024    HGB 12.1 (L) 05/18/2024    HCT 37.0 05/18/2024    MCV 86.4 05/18/2024     05/18/2024     Lab Results   Component Value Date    CRP 4.0 03/09/2024    CRP 4.0 03/08/2024    CRP 76.0 (H) 12/03/2023     Lab Results   Component Value Date    SEDRATE 15 03/09/2024    SEDRATE 10 03/08/2024    SEDRATE 33 (H) 12/03/2023     Radiology:  Impression:        1. Diffuse subcutaneous soft tissue swelling along the dorsum and lateral  aspects of the foot, with a suggestion of a tiny, superficial soft tissue  ulcer along the plantar aspects of the distal lateral foot. No discrete  cystic or solid mass is noted.  2. No evidence to suggest osteomyelitis.  3. Again status post amputation of the 5th toe and distal 5th metatarsal bone  when compared to radiographs performed 05/15/2024.  4. Osteo-degenerative changes again seen.  5. Calcaneal bone spurs again noted.       Microbiology:   Surgical culture right foot tissue 5/16/2024 GPC, GNR, GPC in chains, evaluating for Staphylococcus aureus  Right foot bone culture 5/16/2024 no growth  Blood cultures 5/15/2024 no growth 2 days foot wound culture 5/15/2024 many GPC's in pairs, evaluating for Proteus species    Assessment   S/p Right 4th toe amputation to level of MTP joint, I & D, right 4th metatarsal bone, Open fascitomy right lateral and plantar compartment of the forefront 5/15/24  PVD   CKD   HTN     Plan  Continue merrem and vancomycin  Follow-up cultures  Podiatry following-considering wound VAC.         Zamzam Kilgore, MOIZ - CNP  11:07 AM  5/19/2024  
°C) (Temporal)   Resp 16   Ht 2.007 m (6' 7\")   Wt 113.4 kg (250 lb)   SpO2 97%   BMI 28.16 kg/m²     General Appearance: alert and oriented to person, place and time and in no acute distress  Skin: warm and dry  Head: normocephalic and atraumatic  Eyes: pupils equal, round, and reactive to light, extraocular eye movements intact, conjunctivae normal  Neck: neck supple and non tender without mass   Pulmonary/Chest: clear to auscultation bilaterally- no wheezes, rales or rhonchi, normal air movement, no respiratory distress  Cardiovascular: normal rate, normal S1 and S2 and no carotid bruits  Abdomen: soft, non-tender, non-distended, normal bowel sounds, no masses or organomegaly  Extremities: no cyanosis, no clubbing and no edema, Right Toe dressings in place   Neurologic: no cranial nerve deficit and speech normal        Recent Labs     05/18/24  0835 05/20/24  0845    140   K 3.8 4.0    102   CO2 24 24   BUN 11 8   CREATININE 0.7 0.7   GLUCOSE 138* 168*   CALCIUM 8.5* 9.0         Recent Labs     05/18/24  0835 05/20/24  0845   WBC 5.9 5.9   RBC 4.28 4.29   HGB 12.1* 12.2*   HCT 37.0 37.0   MCV 86.4 86.2   MCH 28.3 28.4   MCHC 32.7 33.0   RDW 13.0 12.8    193   MPV 9.3 9.2         Radiology:   CT FOOT RIGHT WO CONTRAST   Final Result   1. Diffuse subcutaneous soft tissue swelling along the dorsum and lateral   aspects of the foot, with a suggestion of a tiny, superficial soft tissue   ulcer along the plantar aspects of the distal lateral foot. No discrete   cystic or solid mass is noted.   2. No evidence to suggest osteomyelitis.   3. Again status post amputation of the 5th toe and distal 5th metatarsal bone   when compared to radiographs performed 05/15/2024.   4. Osteo-degenerative changes again seen.   5. Calcaneal bone spurs again noted.         XR CHEST (2 VW)   Final Result   No evidence of pneumonia or pleural effusion.         XR FOOT RIGHT (MIN 3 VIEWS)   Final Result   No 
Meds:.hydrALAZINE, sodium chloride flush, sodium chloride, sodium chloride flush, sodium chloride, acetaminophen **OR** acetaminophen, oxyCODONE-acetaminophen **AND** oxyCODONE, glucose, dextrose bolus **OR** dextrose bolus, glucagon (rDNA), dextrose    RADIOLOGY:  CT FOOT RIGHT WO CONTRAST   Final Result   1. Diffuse subcutaneous soft tissue swelling along the dorsum and lateral   aspects of the foot, with a suggestion of a tiny, superficial soft tissue   ulcer along the plantar aspects of the distal lateral foot. No discrete   cystic or solid mass is noted.   2. No evidence to suggest osteomyelitis.   3. Again status post amputation of the 5th toe and distal 5th metatarsal bone   when compared to radiographs performed 05/15/2024.   4. Osteo-degenerative changes again seen.   5. Calcaneal bone spurs again noted.         XR CHEST (2 VW)   Final Result   No evidence of pneumonia or pleural effusion.         XR FOOT RIGHT (MIN 3 VIEWS)   Final Result   No radiographic findings to suggest osteomyelitis or subcutaneous gas.             Vitals:    /64   Pulse 80   Temp 97 °F (36.1 °C) (Temporal)   Resp 18   Ht 2.007 m (6' 7\")   Wt 113.4 kg (250 lb)   SpO2 98%   BMI 28.16 kg/m²     LABS:   Recent Labs     05/15/24  1841 05/16/24  0513   WBC 12.1* 9.4   HGB 14.2 12.8   HCT 42.9 38.3    148     Recent Labs     05/16/24  0513 05/17/24  0755     --    K 3.6  --    CL 98  --    CO2 19*  --    BUN 16  --    CREATININE 0.7 0.7     Recent Labs     05/15/24  1841 05/16/24  0513   INR 1.3 1.4   APTT 28.7  --        ASSESSMENTS:   -Infected ulceration-right foot  -Type 2 diabetes mellitus  -CAD  -CKD  -HTN  -PVD  -s/p right 4th digit amputation 5/16/24    PLAN:    - Patient was examined and evaluated.Reviewed patient's recent lab results, charts and pertinent diagnostic imaging.Reviewed ancillary service notes.  - Antibiotics as per ID:  - Cultures: Pending  - CT:  1. Diffuse subcutaneous soft tissue 
Cultures: Pending  - CT:  1. Diffuse subcutaneous soft tissue swelling along the dorsum and lateral  aspects of the foot, with a suggestion of a tiny, superficial soft tissue  ulcer along the plantar aspects of the distal lateral foot. No discrete  cystic or solid mass is noted.  2. No evidence to suggest osteomyelitis.  3. Again status post amputation of the 5th toe and distal 5th metatarsal bone  when compared to radiographs performed 05/15/2024.  4. Osteo-degenerative changes again seen.  5. Calcaneal bone spurs again noted.  - Dressing: wound VAC MWF changes  - Will continue to follow patient while they are in-house.  -Patient ok to d/c from podiatry perspective    - Discussed patient with  Romeo Marr DPM FACFAS  Fellowship-Trained Foot and Ankle Surgeon  Diplomate, [employer] Foot [employer]le Sur[employer]  [phone]    Thank you for the opportunity to take part in the patient's care. Please do not hesitate to call for any questions or concerns.

## 2024-05-21 NOTE — HOME CARE
JOSE PETIT    Ordered therapy at time of quote: CEFAZOLIN 2GM IV Q8H   Deductible: $0  Coverage: [employer] COPAY FOR DRUG; 80% PER [employer] COVERAGE  Out-of-Pocket Max: $8850  Total pt responsibility (after deductible met): $256.72/WEEK

## 2024-05-22 LAB
MICROORGANISM SPEC CULT: NORMAL
SPECIMEN DESCRIPTION: NORMAL
SURGICAL PATHOLOGY REPORT: NORMAL

## 2024-05-23 ENCOUNTER — TELEPHONE (OUTPATIENT)
Dept: PRIMARY CARE CLINIC | Age: 54
End: 2024-05-23

## 2024-05-24 LAB
ALBUMIN: 3.8 G/DL (ref 3.5–5.2)
ALP BLD-CCNC: 93 U/L (ref 40–129)
ALT SERPL-CCNC: 68 U/L (ref 0–40)
ANION GAP SERPL CALCULATED.3IONS-SCNC: 11 MMOL/L (ref 7–16)
AST SERPL-CCNC: 50 U/L (ref 0–39)
BASOPHILS ABSOLUTE: 0.06 K/UL (ref 0–0.2)
BASOPHILS RELATIVE PERCENT: 1 % (ref 0–2)
BILIRUB SERPL-MCNC: 0.3 MG/DL (ref 0–1.2)
BUN BLDV-MCNC: 14 MG/DL (ref 6–20)
C-REACTIVE PROTEIN: 9 MG/L (ref 0–5)
CALCIUM SERPL-MCNC: 8.8 MG/DL (ref 8.6–10.2)
CHLORIDE BLD-SCNC: 99 MMOL/L (ref 98–107)
CO2: 26 MMOL/L (ref 22–29)
CREAT SERPL-MCNC: 0.8 MG/DL (ref 0.7–1.2)
EOSINOPHILS ABSOLUTE: 0.07 K/UL (ref 0.05–0.5)
EOSINOPHILS RELATIVE PERCENT: 2 % (ref 0–6)
GFR, ESTIMATED: >90 ML/MIN/1.73M2
GLUCOSE BLD-MCNC: 215 MG/DL (ref 74–99)
HCT VFR BLD CALC: 38.7 % (ref 37–54)
HEMOGLOBIN: 12.6 G/DL (ref 12.5–16.5)
IMMATURE GRANULOCYTES %: 1 % (ref 0–5)
IMMATURE GRANULOCYTES ABSOLUTE: <0.03 K/UL (ref 0–0.58)
LYMPHOCYTES ABSOLUTE: 1.2 K/UL (ref 1.5–4)
LYMPHOCYTES RELATIVE PERCENT: 29 % (ref 20–42)
MCH RBC QN AUTO: 28.4 PG (ref 26–35)
MCHC RBC AUTO-ENTMCNC: 32.6 G/DL (ref 32–34.5)
MCV RBC AUTO: 87.4 FL (ref 80–99.9)
MONOCYTES ABSOLUTE: 0.39 K/UL (ref 0.1–0.95)
MONOCYTES RELATIVE PERCENT: 9 % (ref 2–12)
NEUTROPHILS ABSOLUTE: 2.45 K/UL (ref 1.8–7.3)
NEUTROPHILS RELATIVE PERCENT: 59 % (ref 43–80)
PDW BLD-RTO: 13.1 % (ref 11.5–15)
PLATELET # BLD: 215 K/UL (ref 130–450)
PMV BLD AUTO: 9.4 FL (ref 7–12)
POTASSIUM SERPL-SCNC: 4.1 MMOL/L (ref 3.5–5)
RBC # BLD: 4.43 M/UL (ref 3.8–5.8)
SED RATE, AUTOMATED: 56 MM/HR (ref 0–15)
SODIUM BLD-SCNC: 136 MMOL/L (ref 132–146)
TOTAL PROTEIN: 7.6 G/DL (ref 6.4–8.3)
WBC # BLD: 4.2 K/UL (ref 4.5–11.5)

## 2024-05-25 LAB
MICROORGANISM SPEC CULT: NORMAL
MICROORGANISM SPEC CULT: NORMAL
MICROORGANISM/AGENT SPEC: NORMAL
MICROORGANISM/AGENT SPEC: NORMAL
SPECIMEN DESCRIPTION: NORMAL
SPECIMEN DESCRIPTION: NORMAL

## 2024-05-27 LAB
ALBUMIN: 4 G/DL (ref 3.5–5.2)
ALP BLD-CCNC: 80 U/L (ref 40–129)
ALT SERPL-CCNC: 47 U/L (ref 0–40)
ANION GAP SERPL CALCULATED.3IONS-SCNC: 13 MMOL/L (ref 7–16)
AST SERPL-CCNC: 37 U/L (ref 0–39)
BASOPHILS ABSOLUTE: 0.05 K/UL (ref 0–0.2)
BASOPHILS RELATIVE PERCENT: 1 % (ref 0–2)
BILIRUB SERPL-MCNC: 0.3 MG/DL (ref 0–1.2)
BUN BLDV-MCNC: 14 MG/DL (ref 6–20)
C-REACTIVE PROTEIN: 3 MG/L (ref 0–5)
CALCIUM SERPL-MCNC: 9.3 MG/DL (ref 8.6–10.2)
CHLORIDE BLD-SCNC: 101 MMOL/L (ref 98–107)
CO2: 24 MMOL/L (ref 22–29)
CREAT SERPL-MCNC: 0.6 MG/DL (ref 0.7–1.2)
EOSINOPHILS ABSOLUTE: 0.07 K/UL (ref 0.05–0.5)
EOSINOPHILS RELATIVE PERCENT: 1 % (ref 0–6)
GFR, ESTIMATED: >90 ML/MIN/1.73M2
GLUCOSE BLD-MCNC: 126 MG/DL (ref 74–99)
HCT VFR BLD CALC: 38.9 % (ref 37–54)
HEMOGLOBIN: 12.6 G/DL (ref 12.5–16.5)
IMMATURE GRANULOCYTES %: 1 % (ref 0–5)
IMMATURE GRANULOCYTES ABSOLUTE: 0.03 K/UL (ref 0–0.58)
LYMPHOCYTES ABSOLUTE: 1.2 K/UL (ref 1.5–4)
LYMPHOCYTES RELATIVE PERCENT: 22 % (ref 20–42)
MCH RBC QN AUTO: 28.6 PG (ref 26–35)
MCHC RBC AUTO-ENTMCNC: 32.4 G/DL (ref 32–34.5)
MCV RBC AUTO: 88.4 FL (ref 80–99.9)
MONOCYTES ABSOLUTE: 0.45 K/UL (ref 0.1–0.95)
MONOCYTES RELATIVE PERCENT: 8 % (ref 2–12)
NEUTROPHILS ABSOLUTE: 3.59 K/UL (ref 1.8–7.3)
NEUTROPHILS RELATIVE PERCENT: 67 % (ref 43–80)
PDW BLD-RTO: 13.3 % (ref 11.5–15)
PLATELET # BLD: 241 K/UL (ref 130–450)
PMV BLD AUTO: 9.5 FL (ref 7–12)
POTASSIUM SERPL-SCNC: 4.1 MMOL/L (ref 3.5–5)
RBC # BLD: 4.4 M/UL (ref 3.8–5.8)
SED RATE, AUTOMATED: 45 MM/HR (ref 0–15)
SODIUM BLD-SCNC: 138 MMOL/L (ref 132–146)
TOTAL PROTEIN: 7.5 G/DL (ref 6.4–8.3)
WBC # BLD: 5.4 K/UL (ref 4.5–11.5)

## 2024-05-28 NOTE — ANESTHESIA POSTPROCEDURE EVALUATION
Department of Anesthesiology  Postprocedure Note    Patient: Dejan Patino  MRN: [id number]  YOB: 1970  Date of evaluation: 5/28/2024    Procedure Summary       Date: 05/16/24 Room / Location: [address]    Anesthesia Start: 2042 Anesthesia Stop: 2129    Procedure: RIGHT FOOT DEBRIDEMENT OF ALL NONVIABLE TISSUE, FOURTH TOE AMPUTATION (Right: Foot) Diagnosis:       Foot ulceration, right, with fat layer exposed (HCC)      (Foot ulceration, right, with fat layer exposed (HCC) [L97.512])    Surgeons: Romeo Marr DPM Responsible Provider: Migdalia Sinclair MD    Anesthesia Type: MAC ASA Status: 3            Anesthesia Type: MAC    Sergo Phase I: Sergo Score: 10    Sergo Phase II:      Anesthesia Post Evaluation    Patient location during evaluation: PACU  Patient participation: complete - patient participated  Level of consciousness: awake  Pain score: 0  Airway patency: patent  Nausea & Vomiting: no nausea  Cardiovascular status: hemodynamically stable  Respiratory status: acceptable  Hydration status: stable  Multimodal analgesia pain management approach    No notable events documented.

## 2024-05-30 DIAGNOSIS — E11.42 TYPE 2 DIABETES MELLITUS WITH DIABETIC POLYNEUROPATHY, WITH LONG-TERM CURRENT USE OF INSULIN (HCC): ICD-10-CM

## 2024-05-30 DIAGNOSIS — Z79.4 TYPE 2 DIABETES MELLITUS WITH DIABETIC POLYNEUROPATHY, WITH LONG-TERM CURRENT USE OF INSULIN (HCC): ICD-10-CM

## 2024-05-31 LAB
ALBUMIN: 4.2 G/DL (ref 3.5–5.2)
ALP BLD-CCNC: 69 U/L (ref 40–129)
ALT SERPL-CCNC: 23 U/L (ref 0–40)
ANION GAP SERPL CALCULATED.3IONS-SCNC: 11 MMOL/L (ref 7–16)
AST SERPL-CCNC: 21 U/L (ref 0–39)
BASOPHILS ABSOLUTE: 0.06 K/UL (ref 0–0.2)
BASOPHILS RELATIVE PERCENT: 1 % (ref 0–2)
BILIRUB SERPL-MCNC: 0.3 MG/DL (ref 0–1.2)
BUN BLDV-MCNC: 14 MG/DL (ref 6–20)
CALCIUM SERPL-MCNC: 9.4 MG/DL (ref 8.6–10.2)
CHLORIDE BLD-SCNC: 99 MMOL/L (ref 98–107)
CO2: 27 MMOL/L (ref 22–29)
CREAT SERPL-MCNC: 0.7 MG/DL (ref 0.7–1.2)
EOSINOPHILS ABSOLUTE: 0.08 K/UL (ref 0.05–0.5)
EOSINOPHILS RELATIVE PERCENT: 2 % (ref 0–6)
GFR, ESTIMATED: >90 ML/MIN/1.73M2
GLUCOSE BLD-MCNC: 121 MG/DL (ref 74–99)
HCT VFR BLD CALC: 40.3 % (ref 37–54)
HEMOGLOBIN: 13.4 G/DL (ref 12.5–16.5)
IMMATURE GRANULOCYTES %: 1 % (ref 0–5)
IMMATURE GRANULOCYTES ABSOLUTE: 0.03 K/UL (ref 0–0.58)
LYMPHOCYTES ABSOLUTE: 1.4 K/UL (ref 1.5–4)
LYMPHOCYTES RELATIVE PERCENT: 32 % (ref 20–42)
MCH RBC QN AUTO: 29.3 PG (ref 26–35)
MCHC RBC AUTO-ENTMCNC: 33.3 G/DL (ref 32–34.5)
MCV RBC AUTO: 88.2 FL (ref 80–99.9)
MONOCYTES ABSOLUTE: 0.56 K/UL (ref 0.1–0.95)
MONOCYTES RELATIVE PERCENT: 13 % (ref 2–12)
NEUTROPHILS ABSOLUTE: 2.26 K/UL (ref 1.8–7.3)
NEUTROPHILS RELATIVE PERCENT: 51 % (ref 43–80)
PDW BLD-RTO: 13.8 % (ref 11.5–15)
PLATELET # BLD: 229 K/UL (ref 130–450)
PMV BLD AUTO: 9.7 FL (ref 7–12)
POTASSIUM SERPL-SCNC: 4.2 MMOL/L (ref 3.5–5)
RBC # BLD: 4.57 M/UL (ref 3.8–5.8)
SODIUM BLD-SCNC: 137 MMOL/L (ref 132–146)
TOTAL PROTEIN: 7.8 G/DL (ref 6.4–8.3)
WBC # BLD: 4.4 K/UL (ref 4.5–11.5)

## 2024-05-31 RX ORDER — PROCHLORPERAZINE 25 MG/1
SUPPOSITORY RECTAL
Qty: 3 EACH | Refills: 11 | Status: SHIPPED | OUTPATIENT
Start: 2024-05-31

## 2024-05-31 RX ORDER — PROCHLORPERAZINE 25 MG/1
SUPPOSITORY RECTAL
Qty: 1 EACH | Refills: 4 | Status: SHIPPED | OUTPATIENT
Start: 2024-05-31

## 2024-06-03 LAB
ALBUMIN: 4 G/DL (ref 3.5–5.2)
ALP BLD-CCNC: 65 U/L (ref 40–129)
ALT SERPL-CCNC: 16 U/L (ref 0–40)
ANION GAP SERPL CALCULATED.3IONS-SCNC: 10 MMOL/L (ref 7–16)
AST SERPL-CCNC: 20 U/L (ref 0–39)
BASOPHILS ABSOLUTE: 0.05 K/UL (ref 0–0.2)
BASOPHILS RELATIVE PERCENT: 1 % (ref 0–2)
BILIRUB SERPL-MCNC: 0.3 MG/DL (ref 0–1.2)
BUN BLDV-MCNC: 13 MG/DL (ref 6–20)
C-REACTIVE PROTEIN: <3 MG/L (ref 0–5)
CALCIUM SERPL-MCNC: 9.1 MG/DL (ref 8.6–10.2)
CHLORIDE BLD-SCNC: 100 MMOL/L (ref 98–107)
CO2: 27 MMOL/L (ref 22–29)
CREAT SERPL-MCNC: 0.7 MG/DL (ref 0.7–1.2)
EOSINOPHILS ABSOLUTE: 0.1 K/UL (ref 0.05–0.5)
EOSINOPHILS RELATIVE PERCENT: 2 % (ref 0–6)
GFR, ESTIMATED: >90 ML/MIN/1.73M2
GLUCOSE BLD-MCNC: 115 MG/DL (ref 74–99)
HCT VFR BLD CALC: 40.7 % (ref 37–54)
HEMOGLOBIN: 13 G/DL (ref 12.5–16.5)
IMMATURE GRANULOCYTES %: 1 % (ref 0–5)
IMMATURE GRANULOCYTES ABSOLUTE: <0.03 K/UL (ref 0–0.58)
LYMPHOCYTES ABSOLUTE: 1.35 K/UL (ref 1.5–4)
LYMPHOCYTES RELATIVE PERCENT: 31 % (ref 20–42)
MCH RBC QN AUTO: 28.3 PG (ref 26–35)
MCHC RBC AUTO-ENTMCNC: 31.9 G/DL (ref 32–34.5)
MCV RBC AUTO: 88.7 FL (ref 80–99.9)
MONOCYTES ABSOLUTE: 0.53 K/UL (ref 0.1–0.95)
MONOCYTES RELATIVE PERCENT: 12 % (ref 2–12)
NEUTROPHILS ABSOLUTE: 2.38 K/UL (ref 1.8–7.3)
NEUTROPHILS RELATIVE PERCENT: 54 % (ref 43–80)
PDW BLD-RTO: 14 % (ref 11.5–15)
PLATELET # BLD: 205 K/UL (ref 130–450)
PMV BLD AUTO: 10 FL (ref 7–12)
POTASSIUM SERPL-SCNC: 3.9 MMOL/L (ref 3.5–5)
RBC # BLD: 4.59 M/UL (ref 3.8–5.8)
SED RATE, AUTOMATED: 28 MM/HR (ref 0–15)
SODIUM BLD-SCNC: 137 MMOL/L (ref 132–146)
TOTAL PROTEIN: 7.5 G/DL (ref 6.4–8.3)
WBC # BLD: 4.4 K/UL (ref 4.5–11.5)

## 2024-06-04 DIAGNOSIS — M48.02 CERVICAL STENOSIS OF SPINE: ICD-10-CM

## 2024-06-04 DIAGNOSIS — E11.42 TYPE 2 DIABETES MELLITUS WITH DIABETIC POLYNEUROPATHY, WITH LONG-TERM CURRENT USE OF INSULIN (HCC): ICD-10-CM

## 2024-06-04 DIAGNOSIS — S12.401D CLOSED NONDISPLACED FRACTURE OF FIFTH CERVICAL VERTEBRA WITH ROUTINE HEALING, UNSPECIFIED FRACTURE MORPHOLOGY, SUBSEQUENT ENCOUNTER: ICD-10-CM

## 2024-06-04 DIAGNOSIS — F33.1 MODERATE RECURRENT MAJOR DEPRESSION (HCC): ICD-10-CM

## 2024-06-04 DIAGNOSIS — Z79.4 TYPE 2 DIABETES MELLITUS WITH DIABETIC POLYNEUROPATHY, WITH LONG-TERM CURRENT USE OF INSULIN (HCC): ICD-10-CM

## 2024-06-04 DIAGNOSIS — F41.1 GAD (GENERALIZED ANXIETY DISORDER): ICD-10-CM

## 2024-06-04 DIAGNOSIS — Z95.1 HISTORY OF FOUR VESSEL CORONARY ARTERY BYPASS GRAFT: ICD-10-CM

## 2024-06-04 RX ORDER — ROSUVASTATIN CALCIUM 40 MG/1
40 TABLET, COATED ORAL NIGHTLY
Qty: 90 TABLET | Refills: 1 | Status: CANCELLED | OUTPATIENT
Start: 2024-06-04

## 2024-06-04 RX ORDER — BUPROPION HYDROCHLORIDE 150 MG/1
150 TABLET ORAL EVERY MORNING
Qty: 90 TABLET | Refills: 1 | Status: CANCELLED | OUTPATIENT
Start: 2024-06-04

## 2024-06-04 RX ORDER — BUSPIRONE HYDROCHLORIDE 15 MG/1
TABLET ORAL
Qty: 180 TABLET | Refills: 1 | Status: CANCELLED | OUTPATIENT
Start: 2024-06-04

## 2024-06-04 RX ORDER — INSULIN GLARGINE 100 [IU]/ML
30 INJECTION, SOLUTION SUBCUTANEOUS 2 TIMES DAILY
Qty: 10 ML | Refills: 3 | OUTPATIENT
Start: 2024-06-04

## 2024-06-04 RX ORDER — OXYCODONE AND ACETAMINOPHEN 7.5; 325 MG/1; MG/1
1 TABLET ORAL 2 TIMES DAILY PRN
Qty: 60 TABLET | Refills: 0 | Status: SHIPPED | OUTPATIENT
Start: 2024-06-04 | End: 2024-07-04

## 2024-06-04 RX ORDER — PEN NEEDLE, DIABETIC 32 GX3/16"
NEEDLE, DISPOSABLE MISCELLANEOUS
Qty: 400 EACH | Refills: 1 | OUTPATIENT
Start: 2024-06-04

## 2024-06-04 NOTE — TELEPHONE ENCOUNTER
Last Appointment:  3/28/2024  Future Appointments   Date Time Provider Department Center   6/6/2024  9:00 AM Bayron Oneill APRN - CNP [facility]   9/27/2024  1:00 PM Roger Smith MD [facility]

## 2024-06-10 LAB
ALBUMIN: 4.3 G/DL (ref 3.5–5.2)
ALP BLD-CCNC: 71 U/L (ref 40–129)
ALT SERPL-CCNC: 37 U/L (ref 0–40)
ANION GAP SERPL CALCULATED.3IONS-SCNC: 12 MMOL/L (ref 7–16)
AST SERPL-CCNC: 41 U/L (ref 0–39)
BASOPHILS ABSOLUTE: 0.06 K/UL (ref 0–0.2)
BASOPHILS RELATIVE PERCENT: 1 % (ref 0–2)
BILIRUB SERPL-MCNC: 0.4 MG/DL (ref 0–1.2)
BUN BLDV-MCNC: 15 MG/DL (ref 6–20)
C-REACTIVE PROTEIN: <3 MG/L (ref 0–5)
CALCIUM SERPL-MCNC: 9.3 MG/DL (ref 8.6–10.2)
CHLORIDE BLD-SCNC: 100 MMOL/L (ref 98–107)
CO2: 24 MMOL/L (ref 22–29)
CREAT SERPL-MCNC: 0.7 MG/DL (ref 0.7–1.2)
EOSINOPHILS ABSOLUTE: 0.1 K/UL (ref 0.05–0.5)
EOSINOPHILS RELATIVE PERCENT: 2 % (ref 0–6)
GFR, ESTIMATED: >90 ML/MIN/1.73M2
GLUCOSE BLD-MCNC: 133 MG/DL (ref 74–99)
HCT VFR BLD CALC: 44.4 % (ref 37–54)
HEMOGLOBIN: 14.1 G/DL (ref 12.5–16.5)
IMMATURE GRANULOCYTES %: 0 % (ref 0–5)
IMMATURE GRANULOCYTES ABSOLUTE: <0.03 K/UL (ref 0–0.58)
LYMPHOCYTES ABSOLUTE: 1.92 K/UL (ref 1.5–4)
LYMPHOCYTES RELATIVE PERCENT: 38 % (ref 20–42)
MCH RBC QN AUTO: 28 PG (ref 26–35)
MCHC RBC AUTO-ENTMCNC: 31.8 G/DL (ref 32–34.5)
MCV RBC AUTO: 88.3 FL (ref 80–99.9)
MONOCYTES ABSOLUTE: 0.62 K/UL (ref 0.1–0.95)
MONOCYTES RELATIVE PERCENT: 12 % (ref 2–12)
NEUTROPHILS ABSOLUTE: 2.3 K/UL (ref 1.8–7.3)
NEUTROPHILS RELATIVE PERCENT: 46 % (ref 43–80)
PDW BLD-RTO: 14.2 % (ref 11.5–15)
PLATELET # BLD: 153 K/UL (ref 130–450)
PMV BLD AUTO: 10.1 FL (ref 7–12)
POTASSIUM SERPL-SCNC: 4.6 MMOL/L (ref 3.5–5)
RBC # BLD: 5.03 M/UL (ref 3.8–5.8)
SED RATE, AUTOMATED: 37 MM/HR (ref 0–15)
SODIUM BLD-SCNC: 136 MMOL/L (ref 132–146)
TOTAL PROTEIN: 8 G/DL (ref 6.4–8.3)
WBC # BLD: 5 K/UL (ref 4.5–11.5)

## 2024-06-15 LAB
MICROORGANISM SPEC CULT: NORMAL
MICROORGANISM/AGENT SPEC: NORMAL
SPECIMEN DESCRIPTION: NORMAL

## 2024-07-05 DIAGNOSIS — E11.42 TYPE 2 DIABETES MELLITUS WITH DIABETIC POLYNEUROPATHY, WITH LONG-TERM CURRENT USE OF INSULIN (HCC): ICD-10-CM

## 2024-07-05 DIAGNOSIS — S12.401D CLOSED NONDISPLACED FRACTURE OF FIFTH CERVICAL VERTEBRA WITH ROUTINE HEALING, UNSPECIFIED FRACTURE MORPHOLOGY, SUBSEQUENT ENCOUNTER: ICD-10-CM

## 2024-07-05 DIAGNOSIS — M48.02 CERVICAL STENOSIS OF SPINE: ICD-10-CM

## 2024-07-05 DIAGNOSIS — Z79.4 TYPE 2 DIABETES MELLITUS WITH DIABETIC POLYNEUROPATHY, WITH LONG-TERM CURRENT USE OF INSULIN (HCC): ICD-10-CM

## 2024-07-05 NOTE — TELEPHONE ENCOUNTER
Last Appointment:  3/28/2024  Future Appointments   Date Time Provider Department Center   9/27/2024  1:00 PM Roger Smith MD [facility]

## 2024-07-09 RX ORDER — PROCHLORPERAZINE 25 MG/1
SUPPOSITORY RECTAL
Qty: 1 EACH | Refills: 4 | Status: SHIPPED | OUTPATIENT
Start: 2024-07-09

## 2024-07-09 RX ORDER — OXYCODONE AND ACETAMINOPHEN 7.5; 325 MG/1; MG/1
1 TABLET ORAL 2 TIMES DAILY PRN
Qty: 60 TABLET | Refills: 0 | Status: SHIPPED | OUTPATIENT
Start: 2024-07-09 | End: 2024-08-08

## 2024-08-07 DIAGNOSIS — S12.401D CLOSED NONDISPLACED FRACTURE OF FIFTH CERVICAL VERTEBRA WITH ROUTINE HEALING, UNSPECIFIED FRACTURE MORPHOLOGY, SUBSEQUENT ENCOUNTER: ICD-10-CM

## 2024-08-07 DIAGNOSIS — M48.02 CERVICAL STENOSIS OF SPINE: ICD-10-CM

## 2024-08-07 DIAGNOSIS — Z79.4 TYPE 2 DIABETES MELLITUS WITH DIABETIC POLYNEUROPATHY, WITH LONG-TERM CURRENT USE OF INSULIN (HCC): Primary | ICD-10-CM

## 2024-08-07 DIAGNOSIS — E11.42 TYPE 2 DIABETES MELLITUS WITH DIABETIC POLYNEUROPATHY, WITH LONG-TERM CURRENT USE OF INSULIN (HCC): Primary | ICD-10-CM

## 2024-08-08 RX ORDER — INSULIN GLARGINE 100 [IU]/ML
35 INJECTION, SOLUTION SUBCUTANEOUS 2 TIMES DAILY
Qty: 20 ML | Refills: 3 | Status: SHIPPED | OUTPATIENT
Start: 2024-08-08

## 2024-08-08 RX ORDER — LANCETS 30 GAUGE
1 EACH MISCELLANEOUS 3 TIMES DAILY
Qty: 400 EACH | Refills: 1 | Status: SHIPPED | OUTPATIENT
Start: 2024-08-08

## 2024-08-08 RX ORDER — OXYCODONE AND ACETAMINOPHEN 7.5; 325 MG/1; MG/1
1 TABLET ORAL 2 TIMES DAILY PRN
Qty: 60 TABLET | Refills: 0 | Status: SHIPPED | OUTPATIENT
Start: 2024-08-08 | End: 2024-09-07

## 2024-09-12 DIAGNOSIS — M48.02 CERVICAL STENOSIS OF SPINE: ICD-10-CM

## 2024-09-12 DIAGNOSIS — S12.401D CLOSED NONDISPLACED FRACTURE OF FIFTH CERVICAL VERTEBRA WITH ROUTINE HEALING, UNSPECIFIED FRACTURE MORPHOLOGY, SUBSEQUENT ENCOUNTER: ICD-10-CM

## 2024-09-12 RX ORDER — OXYCODONE AND ACETAMINOPHEN 7.5; 325 MG/1; MG/1
1 TABLET ORAL 2 TIMES DAILY PRN
Qty: 60 TABLET | Refills: 0 | Status: SHIPPED | OUTPATIENT
Start: 2024-09-12 | End: 2024-10-12

## 2024-09-12 RX ORDER — OXYCODONE AND ACETAMINOPHEN 7.5; 325 MG/1; MG/1
1 TABLET ORAL 2 TIMES DAILY PRN
Qty: 60 TABLET | Refills: 0 | Status: CANCELLED | OUTPATIENT
Start: 2024-09-12 | End: 2024-10-12

## 2024-09-20 DIAGNOSIS — E11.42 TYPE 2 DIABETES MELLITUS WITH DIABETIC POLYNEUROPATHY, WITH LONG-TERM CURRENT USE OF INSULIN (HCC): ICD-10-CM

## 2024-09-20 DIAGNOSIS — F41.1 GAD (GENERALIZED ANXIETY DISORDER): ICD-10-CM

## 2024-09-20 DIAGNOSIS — Z79.4 TYPE 2 DIABETES MELLITUS WITH DIABETIC POLYNEUROPATHY, WITH LONG-TERM CURRENT USE OF INSULIN (HCC): ICD-10-CM

## 2024-09-20 DIAGNOSIS — F33.1 MODERATE RECURRENT MAJOR DEPRESSION (HCC): ICD-10-CM

## 2024-09-20 DIAGNOSIS — Z95.1 HISTORY OF FOUR VESSEL CORONARY ARTERY BYPASS GRAFT: ICD-10-CM

## 2024-09-20 RX ORDER — BUSPIRONE HYDROCHLORIDE 15 MG/1
TABLET ORAL
Qty: 180 TABLET | Refills: 1 | Status: SHIPPED | OUTPATIENT
Start: 2024-09-20

## 2024-09-20 RX ORDER — ROSUVASTATIN CALCIUM 40 MG/1
40 TABLET, COATED ORAL NIGHTLY
Qty: 90 TABLET | Refills: 1 | Status: SHIPPED | OUTPATIENT
Start: 2024-09-20

## 2024-09-20 RX ORDER — BUPROPION HYDROCHLORIDE 150 MG/1
150 TABLET ORAL EVERY MORNING
Qty: 90 TABLET | Refills: 1 | Status: SHIPPED | OUTPATIENT
Start: 2024-09-20

## 2024-10-08 ENCOUNTER — OFFICE VISIT (OUTPATIENT)
Dept: PRIMARY CARE CLINIC | Age: 54
End: 2024-10-08
Payer: MEDICARE

## 2024-10-08 VITALS
TEMPERATURE: 97.1 F | OXYGEN SATURATION: 98 % | WEIGHT: 270.8 LBS | BODY MASS INDEX: 31.33 KG/M2 | HEART RATE: 84 BPM | HEIGHT: 78 IN | DIASTOLIC BLOOD PRESSURE: 70 MMHG | RESPIRATION RATE: 16 BRPM | SYSTOLIC BLOOD PRESSURE: 122 MMHG

## 2024-10-08 DIAGNOSIS — M48.02 CERVICAL STENOSIS OF SPINE: ICD-10-CM

## 2024-10-08 DIAGNOSIS — Z12.11 COLON CANCER SCREENING: ICD-10-CM

## 2024-10-08 DIAGNOSIS — M25.512 ACUTE PAIN OF LEFT SHOULDER: ICD-10-CM

## 2024-10-08 DIAGNOSIS — G47.00 INSOMNIA, UNSPECIFIED TYPE: ICD-10-CM

## 2024-10-08 DIAGNOSIS — E11.42 TYPE 2 DIABETES MELLITUS WITH DIABETIC POLYNEUROPATHY, WITH LONG-TERM CURRENT USE OF INSULIN (HCC): Primary | ICD-10-CM

## 2024-10-08 DIAGNOSIS — E11.610 CHARCOT FOOT DUE TO DIABETES MELLITUS (HCC): ICD-10-CM

## 2024-10-08 DIAGNOSIS — Z79.4 TYPE 2 DIABETES MELLITUS WITH DIABETIC POLYNEUROPATHY, WITH LONG-TERM CURRENT USE OF INSULIN (HCC): Primary | ICD-10-CM

## 2024-10-08 DIAGNOSIS — M25.552 LEFT HIP PAIN: ICD-10-CM

## 2024-10-08 DIAGNOSIS — E11.65 POORLY CONTROLLED TYPE 2 DIABETES MELLITUS (HCC): ICD-10-CM

## 2024-10-08 LAB
CREATININE URINE: 39.8 MG/DL (ref 40–278)
HBA1C MFR BLD: 8.9 %
MICROALBUMIN/CREAT 24H UR: 26 MG/L (ref 0–19)
MICROALBUMIN/CREAT UR-RTO: 64 MCG/MG CREAT (ref 0–30)

## 2024-10-08 PROCEDURE — 4004F PT TOBACCO SCREEN RCVD TLK: CPT | Performed by: FAMILY MEDICINE

## 2024-10-08 PROCEDURE — 3052F HG A1C>EQUAL 8.0%<EQUAL 9.0%: CPT | Performed by: FAMILY MEDICINE

## 2024-10-08 PROCEDURE — 3078F DIAST BP <80 MM HG: CPT | Performed by: FAMILY MEDICINE

## 2024-10-08 PROCEDURE — 3074F SYST BP LT 130 MM HG: CPT | Performed by: FAMILY MEDICINE

## 2024-10-08 PROCEDURE — G8427 DOCREV CUR MEDS BY ELIG CLIN: HCPCS | Performed by: FAMILY MEDICINE

## 2024-10-08 PROCEDURE — 3017F COLORECTAL CA SCREEN DOC REV: CPT | Performed by: FAMILY MEDICINE

## 2024-10-08 PROCEDURE — G8484 FLU IMMUNIZE NO ADMIN: HCPCS | Performed by: FAMILY MEDICINE

## 2024-10-08 PROCEDURE — G2211 COMPLEX E/M VISIT ADD ON: HCPCS | Performed by: FAMILY MEDICINE

## 2024-10-08 PROCEDURE — 99214 OFFICE O/P EST MOD 30 MIN: CPT | Performed by: FAMILY MEDICINE

## 2024-10-08 PROCEDURE — G8417 CALC BMI ABV UP PARAM F/U: HCPCS | Performed by: FAMILY MEDICINE

## 2024-10-08 PROCEDURE — 2022F DILAT RTA XM EVC RTNOPTHY: CPT | Performed by: FAMILY MEDICINE

## 2024-10-08 PROCEDURE — 83036 HEMOGLOBIN GLYCOSYLATED A1C: CPT | Performed by: FAMILY MEDICINE

## 2024-10-08 RX ORDER — OXYCODONE AND ACETAMINOPHEN 7.5; 325 MG/1; MG/1
1 TABLET ORAL 2 TIMES DAILY PRN
Qty: 60 TABLET | Refills: 0 | Status: SHIPPED | OUTPATIENT
Start: 2024-10-08 | End: 2024-11-07

## 2024-10-08 RX ORDER — PROCHLORPERAZINE 25 MG/1
SUPPOSITORY RECTAL
Qty: 1 EACH | Refills: 3 | Status: SHIPPED | OUTPATIENT
Start: 2024-10-08

## 2024-10-08 RX ORDER — INSULIN GLARGINE 100 [IU]/ML
80 INJECTION, SOLUTION SUBCUTANEOUS NIGHTLY
Qty: 8 ADJUSTABLE DOSE PRE-FILLED PEN SYRINGE | Refills: 3 | Status: SHIPPED | OUTPATIENT
Start: 2024-10-08

## 2024-10-08 RX ORDER — RAMELTEON 8 MG/1
8 TABLET ORAL NIGHTLY
Qty: 90 TABLET | Refills: 0 | Status: SHIPPED | OUTPATIENT
Start: 2024-10-08

## 2024-10-08 RX ORDER — PROCHLORPERAZINE 25 MG/1
SUPPOSITORY RECTAL
Qty: 9 EACH | Refills: 3 | Status: SHIPPED | OUTPATIENT
Start: 2024-10-08

## 2024-10-08 SDOH — ECONOMIC STABILITY: FOOD INSECURITY: WITHIN THE PAST 12 MONTHS, YOU WORRIED THAT YOUR FOOD WOULD RUN OUT BEFORE YOU GOT MONEY TO BUY MORE.: NEVER TRUE

## 2024-10-08 SDOH — ECONOMIC STABILITY: FOOD INSECURITY: WITHIN THE PAST 12 MONTHS, THE FOOD YOU BOUGHT JUST DIDN'T LAST AND YOU DIDN'T HAVE MONEY TO GET MORE.: NEVER TRUE

## 2024-10-08 SDOH — ECONOMIC STABILITY: INCOME INSECURITY: HOW HARD IS IT FOR YOU TO PAY FOR THE VERY BASICS LIKE FOOD, HOUSING, MEDICAL CARE, AND HEATING?: NOT HARD AT ALL

## 2024-10-08 NOTE — PROGRESS NOTES
Dejan Patino  : 1970    Chief Complaint:     Chief Complaint   Patient presents with    Follow-up     6 month follow up.     HPI  DM2 with history of chronic diabetic wounds / ulcers, s/p amputation of multiple toes. Diabetic charcot foot.     Endorses a lot of snacking middle of the night.   Between 2-4 hours of sleep every night.   In bed at 4am, up by 6-8 am.   For years, insomnia \"runs in my family.\"  Endorses times when he doesn't sleep for 3+ days, several times a month.   Trouble falling asleep, staying asleep.   Endorses feelings of intense anxiety.   Hemoglobin A1C   Date Value Ref Range Status   10/08/2024 8.9 % Corrected      Residual pain in left shoulder, left hip.   Oxycodone helps. Still enough pain that it is difficult to sleep. Taking PRN.     Being treated for wound s/p toe amputation 4th toe right foot.   Podiatry tomorrow. Optometry appt to be scheduled.     Past medical, surgical, family and social histories reviewed and updated today as appropriate    Health Maintenance Due   Topic Date Due    Pneumococcal 0-64 years Vaccine (1 of 2 - PCV) Never done    HIV screen  Never done    Diabetic retinal exam  Never done    Hepatitis C screen  Never done    Hepatitis B vaccine (1 of 3 - 19+ 3-dose series) 1989    Colorectal Cancer Screen  Never done    Shingles vaccine (1 of 2) Never done    Flu vaccine (1) 2024    COVID-19 Vaccine (2023- season) 2024       Current Outpatient Medications   Medication Sig Dispense Refill    ramelteon (ROZEREM) 8 MG tablet Take 1 tablet by mouth nightly 90 tablet 0    oxyCODONE-acetaminophen (PERCOCET) 7.5-325 MG per tablet Take 1 tablet by mouth 2 times daily as needed for Pain for up to 30 days. 60 tablet 0    insulin glargine (LANTUS SOLOSTAR) 100 UNIT/ML injection pen Inject 80 Units into the skin nightly 8 Adjustable Dose Pre-filled Pen Syringe 3    Continuous Glucose Transmitter (DEXCOM G6 TRANSMITTER) MISC Change every 90 days

## 2024-10-09 PROBLEM — R80.9 TYPE 2 DIABETES MELLITUS WITH DIABETIC MICROALBUMINURIA, WITH LONG-TERM CURRENT USE OF INSULIN (HCC): Status: ACTIVE | Noted: 2024-10-09

## 2024-10-09 PROBLEM — Z79.4 TYPE 2 DIABETES MELLITUS WITH DIABETIC MICROALBUMINURIA, WITH LONG-TERM CURRENT USE OF INSULIN (HCC): Status: ACTIVE | Noted: 2024-10-09

## 2024-10-09 PROBLEM — E11.29 TYPE 2 DIABETES MELLITUS WITH DIABETIC MICROALBUMINURIA, WITH LONG-TERM CURRENT USE OF INSULIN (HCC): Status: ACTIVE | Noted: 2024-10-09

## 2024-10-28 PROBLEM — G47.00 INSOMNIA: Status: ACTIVE | Noted: 2024-10-28

## 2024-11-11 DIAGNOSIS — M48.02 CERVICAL STENOSIS OF SPINE: ICD-10-CM

## 2024-11-11 NOTE — TELEPHONE ENCOUNTER
Name of Medication(s) Requested:  Requested Prescriptions     Pending Prescriptions Disp Refills    oxyCODONE-acetaminophen (PERCOCET) 7.5-325 MG per tablet 60 tablet 0     Sig: Take 1 tablet by mouth 2 times daily as needed for Pain for up to 30 days.       Medication is on current medication list Yes    Dosage and directions were verified? Yes    Quantity verified: 30 day supply     Pharmacy Verified?  Yes    Last Appointment:  10/8/2024    Future appts:  Future Appointments   Date Time Provider Department Center   1/8/2025  1:30 PM Roger Smith MD [facility] DEP        (If no appt send self scheduling link. .REFILLAPPT)  Scheduling request sent?     [] Yes  [] No    Does patient need updated?  [] Yes  [] No

## 2024-11-14 DIAGNOSIS — Z79.4 TYPE 2 DIABETES MELLITUS WITH DIABETIC PERIPHERAL ANGIOPATHY WITHOUT GANGRENE, WITH LONG-TERM CURRENT USE OF INSULIN (HCC): ICD-10-CM

## 2024-11-14 DIAGNOSIS — E11.51 TYPE 2 DIABETES MELLITUS WITH DIABETIC PERIPHERAL ANGIOPATHY WITHOUT GANGRENE, WITH LONG-TERM CURRENT USE OF INSULIN (HCC): ICD-10-CM

## 2024-11-14 DIAGNOSIS — Z79.4 TYPE 2 DIABETES MELLITUS WITH DIABETIC POLYNEUROPATHY, WITH LONG-TERM CURRENT USE OF INSULIN (HCC): ICD-10-CM

## 2024-11-14 DIAGNOSIS — E11.42 TYPE 2 DIABETES MELLITUS WITH DIABETIC POLYNEUROPATHY, WITH LONG-TERM CURRENT USE OF INSULIN (HCC): ICD-10-CM

## 2024-11-14 NOTE — TELEPHONE ENCOUNTER
Name of Medication(s) Requested:  Requested Prescriptions     Pending Prescriptions Disp Refills    gabapentin (NEURONTIN) 300 MG capsule 270 capsule 1     Sig: Take 1 capsule by mouth in the morning, at noon, and at bedtime for 90 days.       Medication is on current medication list Yes    Dosage and directions were verified? Yes    Quantity verified: 30 day supply     Pharmacy Verified?  Yes    Last Appointment:  10/8/2024    Future appts:  Future Appointments   Date Time Provider Department Center   1/8/2025  1:30 PM Roger Smith MD [facility] DE[facility]        (If no appt send self scheduling link. .REFILLAPPT)  Scheduling request sent?     [] Yes  [] No    Does patient need updated?  [] Yes  [] No

## 2024-11-17 RX ORDER — OXYCODONE AND ACETAMINOPHEN 7.5; 325 MG/1; MG/1
1 TABLET ORAL 2 TIMES DAILY PRN
Qty: 60 TABLET | Refills: 0 | Status: SHIPPED | OUTPATIENT
Start: 2024-11-17 | End: 2024-12-17

## 2024-11-17 RX ORDER — GABAPENTIN 300 MG/1
300 CAPSULE ORAL 3 TIMES DAILY
Qty: 270 CAPSULE | Refills: 0 | Status: SHIPPED | OUTPATIENT
Start: 2024-11-17 | End: 2025-02-15

## 2024-12-12 ENCOUNTER — TELEPHONE (OUTPATIENT)
Dept: PRIMARY CARE CLINIC | Age: 54
End: 2024-12-12

## 2024-12-12 DIAGNOSIS — M48.02 CERVICAL STENOSIS OF SPINE: ICD-10-CM

## 2024-12-16 RX ORDER — OXYCODONE AND ACETAMINOPHEN 7.5; 325 MG/1; MG/1
1 TABLET ORAL 2 TIMES DAILY PRN
Qty: 60 TABLET | Refills: 0 | Status: SHIPPED | OUTPATIENT
Start: 2024-12-16 | End: 2025-01-15

## 2025-01-16 DIAGNOSIS — M48.02 CERVICAL STENOSIS OF SPINE: ICD-10-CM

## 2025-01-16 DIAGNOSIS — Z79.4 TYPE 2 DIABETES MELLITUS WITH DIABETIC POLYNEUROPATHY, WITH LONG-TERM CURRENT USE OF INSULIN (HCC): ICD-10-CM

## 2025-01-16 DIAGNOSIS — E11.42 TYPE 2 DIABETES MELLITUS WITH DIABETIC POLYNEUROPATHY, WITH LONG-TERM CURRENT USE OF INSULIN (HCC): ICD-10-CM

## 2025-01-16 RX ORDER — OXYCODONE AND ACETAMINOPHEN 7.5; 325 MG/1; MG/1
1 TABLET ORAL 2 TIMES DAILY PRN
Qty: 60 TABLET | Refills: 0 | Status: SHIPPED | OUTPATIENT
Start: 2025-01-16 | End: 2025-02-15

## 2025-01-16 NOTE — TELEPHONE ENCOUNTER
Name of Medication(s) Requested:  Requested Prescriptions     Pending Prescriptions Disp Refills    empagliflozin (JARDIANCE) 25 MG tablet 90 tablet 1     Sig: TAKE ONE TABLET BY MOUTH ONCE DAILY. THIS MEDICATION CAN CAUSE DEHYDRATION IF BLOOD SUGARS ARE NOT CONTROLLED.    oxyCODONE-acetaminophen (PERCOCET) 7.5-325 MG per tablet 60 tablet 0     Sig: Take 1 tablet by mouth 2 times daily as needed for Pain for up to 30 days.       Medication is on current medication list Yes    Dosage and directions were verified? Yes    Quantity verified: 30 day supply     Pharmacy Verified?  Yes    Last Appointment:  10/8/2024    Future appts:  Future Appointments   Date Time Provider Department Center   1/24/2025  3:00 PM Roger Smith MD [facility] DEP        (If no appt send self scheduling link. .REFILLAPPT)  Scheduling request sent?     [] Yes  [] No    Does patient need updated?  [] Yes  [] No

## 2025-01-18 DIAGNOSIS — E11.42 TYPE 2 DIABETES MELLITUS WITH DIABETIC POLYNEUROPATHY, WITH LONG-TERM CURRENT USE OF INSULIN (HCC): ICD-10-CM

## 2025-01-18 DIAGNOSIS — F33.1 MODERATE RECURRENT MAJOR DEPRESSION (HCC): ICD-10-CM

## 2025-01-18 DIAGNOSIS — Z95.1 HISTORY OF FOUR VESSEL CORONARY ARTERY BYPASS GRAFT: ICD-10-CM

## 2025-01-18 DIAGNOSIS — F41.1 GAD (GENERALIZED ANXIETY DISORDER): ICD-10-CM

## 2025-01-18 DIAGNOSIS — Z79.4 TYPE 2 DIABETES MELLITUS WITH DIABETIC POLYNEUROPATHY, WITH LONG-TERM CURRENT USE OF INSULIN (HCC): ICD-10-CM

## 2025-01-18 DIAGNOSIS — Z79.4 TYPE 2 DIABETES MELLITUS WITH DIABETIC PERIPHERAL ANGIOPATHY WITHOUT GANGRENE, WITH LONG-TERM CURRENT USE OF INSULIN (HCC): ICD-10-CM

## 2025-01-18 DIAGNOSIS — E55.9 VITAMIN D DEFICIENCY: ICD-10-CM

## 2025-01-18 DIAGNOSIS — E11.51 TYPE 2 DIABETES MELLITUS WITH DIABETIC PERIPHERAL ANGIOPATHY WITHOUT GANGRENE, WITH LONG-TERM CURRENT USE OF INSULIN (HCC): ICD-10-CM

## 2025-01-19 NOTE — TELEPHONE ENCOUNTER
Name of Medication(s) Requested:  Requested Prescriptions     Pending Prescriptions Disp Refills    vitamin D3 (CHOLECALCIFEROL) 125 MCG (5000 UT) TABS tablet 90 tablet 3     Sig: Take 1 tablet by mouth daily    metoprolol succinate (TOPROL XL) 25 MG extended release tablet 90 tablet 1     Sig: TAKE 1 TABLET BY MOUTH ONCE DAILY FOR 90 DAYS    buPROPion (WELLBUTRIN XL) 150 MG extended release tablet 90 tablet 1     Sig: Take 1 tablet by mouth every morning    busPIRone (BUSPAR) 15 MG tablet 180 tablet 1     Sig: TAKE ONE TABLET BY MOUTH TWO TIMES A DAY    rosuvastatin (CRESTOR) 40 MG tablet 90 tablet 1     Sig: Take 1 tablet by mouth nightly    sertraline (ZOLOFT) 50 MG tablet 90 tablet 1     Sig: TAKE ONE TABLET BY MOUTH DAILY    empagliflozin (JARDIANCE) 25 MG tablet 90 tablet 1     Sig: TAKE ONE TABLET BY MOUTH ONCE DAILY. THIS MEDICATION CAN CAUSE DEHYDRATION IF BLOOD SUGARS ARE NOT CONTROLLED.       Medication is on current medication list Yes    Dosage and directions were verified? Yes    Quantity verified: 30 day supply     Pharmacy Verified?  Yes    Last Appointment:  10/8/2024    Future appts:  Future Appointments   Date Time Provider Department Center   1/24/2025  3:00 PM Roger Smith MD [facility] DEP        (If no appt send self scheduling link. .REFILLAPPT)  Scheduling request sent?     [] Yes  [] No    Does patient need updated?  [] Yes  [] No

## 2025-01-19 NOTE — TELEPHONE ENCOUNTER
Name of Medication(s) Requested:  Requested Prescriptions     Pending Prescriptions Disp Refills    gabapentin (NEURONTIN) 300 MG capsule 270 capsule 0     Sig: Take 1 capsule by mouth in the morning, at noon, and at bedtime for 90 days.       Medication is on current medication list Yes    Dosage and directions were verified? Yes    Quantity verified: 30 day supply     Pharmacy Verified?  Yes    Last Appointment:  10/8/2024    Future appts:  Future Appointments   Date Time Provider Department Center   1/24/2025  3:00 PM Roger Smith MD [facility] DEP        (If no appt send self scheduling link. .REFILLAPPT)  Scheduling request sent?     [] Yes  [] No    Does patient need updated?  [] Yes  [] No

## 2025-01-22 RX ORDER — METOPROLOL SUCCINATE 25 MG/1
TABLET, EXTENDED RELEASE ORAL
Qty: 90 TABLET | Refills: 1 | Status: SHIPPED | OUTPATIENT
Start: 2025-01-22

## 2025-01-22 RX ORDER — GABAPENTIN 300 MG/1
300 CAPSULE ORAL 3 TIMES DAILY
Qty: 270 CAPSULE | Refills: 0 | Status: SHIPPED | OUTPATIENT
Start: 2025-02-08 | End: 2025-05-09

## 2025-01-22 RX ORDER — ROSUVASTATIN CALCIUM 40 MG/1
40 TABLET, COATED ORAL NIGHTLY
Qty: 90 TABLET | Refills: 1 | Status: SHIPPED | OUTPATIENT
Start: 2025-01-22

## 2025-01-22 RX ORDER — BUSPIRONE HYDROCHLORIDE 15 MG/1
TABLET ORAL
Qty: 180 TABLET | Refills: 1 | Status: SHIPPED | OUTPATIENT
Start: 2025-01-22

## 2025-01-22 RX ORDER — BUPROPION HYDROCHLORIDE 150 MG/1
150 TABLET ORAL EVERY MORNING
Qty: 90 TABLET | Refills: 1 | Status: SHIPPED | OUTPATIENT
Start: 2025-01-22

## 2025-02-19 DIAGNOSIS — Z79.4 TYPE 2 DIABETES MELLITUS WITH DIABETIC POLYNEUROPATHY, WITH LONG-TERM CURRENT USE OF INSULIN (HCC): ICD-10-CM

## 2025-02-19 DIAGNOSIS — E11.42 TYPE 2 DIABETES MELLITUS WITH DIABETIC POLYNEUROPATHY, WITH LONG-TERM CURRENT USE OF INSULIN (HCC): ICD-10-CM

## 2025-02-19 DIAGNOSIS — M48.02 CERVICAL STENOSIS OF SPINE: ICD-10-CM

## 2025-02-19 RX ORDER — OXYCODONE AND ACETAMINOPHEN 7.5; 325 MG/1; MG/1
1 TABLET ORAL 2 TIMES DAILY PRN
Qty: 60 TABLET | Refills: 0 | Status: SHIPPED | OUTPATIENT
Start: 2025-02-19 | End: 2025-03-21

## 2025-02-19 NOTE — TELEPHONE ENCOUNTER
Name of Medication(s) Requested:  Requested Prescriptions     Pending Prescriptions Disp Refills    empagliflozin (JARDIANCE) 25 MG tablet 90 tablet 1     Sig: TAKE ONE TABLET BY MOUTH ONCE DAILY. THIS MEDICATION CAN CAUSE DEHYDRATION IF BLOOD SUGARS ARE NOT CONTROLLED.    oxyCODONE-acetaminophen (PERCOCET) 7.5-325 MG per tablet 60 tablet 0     Sig: Take 1 tablet by mouth 2 times daily as needed for Pain for up to 30 days.       Medication is on current medication list Yes    Dosage and directions were verified? Yes    Quantity verified: 30 day supply     Pharmacy Verified?  Yes    Last Appointment:  10/8/2024    Future appts:  No future appointments.     (If no appt send self scheduling link. .REFILLAPPT)  Scheduling request sent?     [] Yes  [] No    Does patient need updated?  [] Yes  [] No

## 2025-03-05 ENCOUNTER — OFFICE VISIT (OUTPATIENT)
Dept: PRIMARY CARE CLINIC | Age: 55
End: 2025-03-05
Payer: MEDICARE

## 2025-03-05 VITALS
WEIGHT: 284.6 LBS | HEIGHT: 78 IN | HEART RATE: 120 BPM | RESPIRATION RATE: 16 BRPM | BODY MASS INDEX: 32.93 KG/M2 | TEMPERATURE: 97.1 F | OXYGEN SATURATION: 98 % | DIASTOLIC BLOOD PRESSURE: 84 MMHG | SYSTOLIC BLOOD PRESSURE: 127 MMHG

## 2025-03-05 DIAGNOSIS — Z95.1 HISTORY OF FOUR VESSEL CORONARY ARTERY BYPASS GRAFT: ICD-10-CM

## 2025-03-05 DIAGNOSIS — F33.1 MODERATE RECURRENT MAJOR DEPRESSION (HCC): ICD-10-CM

## 2025-03-05 DIAGNOSIS — Z79.4 TYPE 2 DIABETES MELLITUS WITH DIABETIC MICROALBUMINURIA, WITH LONG-TERM CURRENT USE OF INSULIN (HCC): ICD-10-CM

## 2025-03-05 DIAGNOSIS — I50.22 CHRONIC SYSTOLIC CHF (CONGESTIVE HEART FAILURE) (HCC): ICD-10-CM

## 2025-03-05 DIAGNOSIS — F41.1 GAD (GENERALIZED ANXIETY DISORDER): ICD-10-CM

## 2025-03-05 DIAGNOSIS — Z00.00 MEDICARE ANNUAL WELLNESS VISIT, SUBSEQUENT: Primary | ICD-10-CM

## 2025-03-05 DIAGNOSIS — E55.9 VITAMIN D DEFICIENCY: ICD-10-CM

## 2025-03-05 DIAGNOSIS — Z79.4 TYPE 2 DIABETES MELLITUS WITH DIABETIC PERIPHERAL ANGIOPATHY WITHOUT GANGRENE, WITH LONG-TERM CURRENT USE OF INSULIN (HCC): ICD-10-CM

## 2025-03-05 DIAGNOSIS — Z23 NEED FOR PNEUMOCOCCAL VACCINE: ICD-10-CM

## 2025-03-05 DIAGNOSIS — R80.9 TYPE 2 DIABETES MELLITUS WITH DIABETIC MICROALBUMINURIA, WITH LONG-TERM CURRENT USE OF INSULIN (HCC): ICD-10-CM

## 2025-03-05 DIAGNOSIS — Z12.11 COLON CANCER SCREENING: ICD-10-CM

## 2025-03-05 DIAGNOSIS — E11.610 CHARCOT FOOT DUE TO DIABETES MELLITUS (HCC): ICD-10-CM

## 2025-03-05 DIAGNOSIS — E11.42 POLYNEUROPATHY DUE TO TYPE 2 DIABETES MELLITUS (HCC): ICD-10-CM

## 2025-03-05 DIAGNOSIS — E11.51 TYPE 2 DIABETES MELLITUS WITH DIABETIC PERIPHERAL ANGIOPATHY WITHOUT GANGRENE, WITH LONG-TERM CURRENT USE OF INSULIN (HCC): ICD-10-CM

## 2025-03-05 DIAGNOSIS — E11.65 POORLY CONTROLLED TYPE 2 DIABETES MELLITUS (HCC): ICD-10-CM

## 2025-03-05 DIAGNOSIS — Z79.4 TYPE 2 DIABETES MELLITUS WITH DIABETIC POLYNEUROPATHY, WITH LONG-TERM CURRENT USE OF INSULIN (HCC): ICD-10-CM

## 2025-03-05 DIAGNOSIS — E11.42 TYPE 2 DIABETES MELLITUS WITH DIABETIC POLYNEUROPATHY, WITH LONG-TERM CURRENT USE OF INSULIN (HCC): ICD-10-CM

## 2025-03-05 DIAGNOSIS — E11.29 TYPE 2 DIABETES MELLITUS WITH DIABETIC MICROALBUMINURIA, WITH LONG-TERM CURRENT USE OF INSULIN (HCC): ICD-10-CM

## 2025-03-05 PROBLEM — E11.621 TYPE 2 DIABETES MELLITUS WITH RIGHT DIABETIC FOOT ULCER (HCC): Status: RESOLVED | Noted: 2024-03-09 | Resolved: 2025-03-05

## 2025-03-05 PROBLEM — L97.519 DIABETIC ULCER OF RIGHT FOOT DUE TO TYPE 2 DIABETES MELLITUS (HCC): Status: RESOLVED | Noted: 2024-05-15 | Resolved: 2025-03-05

## 2025-03-05 PROBLEM — L97.519 TYPE 2 DIABETES MELLITUS WITH RIGHT DIABETIC FOOT ULCER (HCC): Status: RESOLVED | Noted: 2024-03-09 | Resolved: 2025-03-05

## 2025-03-05 PROBLEM — E11.621 DIABETIC ULCER OF RIGHT FOOT DUE TO TYPE 2 DIABETES MELLITUS (HCC): Status: RESOLVED | Noted: 2024-05-15 | Resolved: 2025-03-05

## 2025-03-05 LAB
ALBUMIN: 4.5 G/DL (ref 3.5–5.2)
ALP BLD-CCNC: 89 U/L (ref 40–129)
ALT SERPL-CCNC: 43 U/L (ref 0–40)
ANION GAP SERPL CALCULATED.3IONS-SCNC: 12 MMOL/L (ref 7–16)
AST SERPL-CCNC: 31 U/L (ref 0–39)
BILIRUB SERPL-MCNC: 0.4 MG/DL (ref 0–1.2)
BUN BLDV-MCNC: 20 MG/DL (ref 6–20)
CALCIUM SERPL-MCNC: 9.5 MG/DL (ref 8.6–10.2)
CHLORIDE BLD-SCNC: 100 MMOL/L (ref 98–107)
CHOLESTEROL, TOTAL: 109 MG/DL
CO2: 23 MMOL/L (ref 22–29)
CREAT SERPL-MCNC: 1.3 MG/DL (ref 0.7–1.2)
CREATININE URINE: 53.6 MG/DL (ref 40–278)
GFR, ESTIMATED: 68 ML/MIN/1.73M2
GLUCOSE BLD-MCNC: 284 MG/DL (ref 74–99)
HBA1C MFR BLD: 9.3 %
HCT VFR BLD CALC: 47.9 % (ref 37–54)
HDLC SERPL-MCNC: 40 MG/DL
HEMOGLOBIN: 16.1 G/DL (ref 12.5–16.5)
LDL CHOLESTEROL: 43 MG/DL
MCH RBC QN AUTO: 29.2 PG (ref 26–35)
MCHC RBC AUTO-ENTMCNC: 33.6 G/DL (ref 32–34.5)
MCV RBC AUTO: 86.9 FL (ref 80–99.9)
MICROALBUMIN/CREAT 24H UR: 17 MG/L (ref 0–19)
MICROALBUMIN/CREAT UR-RTO: 32 MCG/MG CREAT (ref 0–30)
PDW BLD-RTO: 12.7 % (ref 11.5–15)
PLATELET # BLD: 173 K/UL (ref 130–450)
PMV BLD AUTO: 10.4 FL (ref 7–12)
POTASSIUM SERPL-SCNC: 5.2 MMOL/L (ref 3.5–5)
RBC # BLD: 5.51 M/UL (ref 3.8–5.8)
SODIUM BLD-SCNC: 135 MMOL/L (ref 132–146)
TOTAL PROTEIN: 8.2 G/DL (ref 6.4–8.3)
TRIGL SERPL-MCNC: 128 MG/DL
TSH SERPL DL<=0.05 MIU/L-ACNC: 2.24 UIU/ML (ref 0.27–4.2)
VLDLC SERPL CALC-MCNC: 26 MG/DL
WBC # BLD: 5.4 K/UL (ref 4.5–11.5)

## 2025-03-05 PROCEDURE — 3079F DIAST BP 80-89 MM HG: CPT | Performed by: FAMILY MEDICINE

## 2025-03-05 PROCEDURE — 90677 PCV20 VACCINE IM: CPT | Performed by: FAMILY MEDICINE

## 2025-03-05 PROCEDURE — 83036 HEMOGLOBIN GLYCOSYLATED A1C: CPT | Performed by: FAMILY MEDICINE

## 2025-03-05 PROCEDURE — 3046F HEMOGLOBIN A1C LEVEL >9.0%: CPT | Performed by: FAMILY MEDICINE

## 2025-03-05 PROCEDURE — G0009 ADMIN PNEUMOCOCCAL VACCINE: HCPCS | Performed by: FAMILY MEDICINE

## 2025-03-05 PROCEDURE — 3017F COLORECTAL CA SCREEN DOC REV: CPT | Performed by: FAMILY MEDICINE

## 2025-03-05 PROCEDURE — G0439 PPPS, SUBSEQ VISIT: HCPCS | Performed by: FAMILY MEDICINE

## 2025-03-05 PROCEDURE — 3074F SYST BP LT 130 MM HG: CPT | Performed by: FAMILY MEDICINE

## 2025-03-05 RX ORDER — BUSPIRONE HYDROCHLORIDE 15 MG/1
TABLET ORAL
Qty: 180 TABLET | Refills: 2 | Status: SHIPPED | OUTPATIENT
Start: 2025-03-05

## 2025-03-05 RX ORDER — GABAPENTIN 300 MG/1
300 CAPSULE ORAL 3 TIMES DAILY
Qty: 270 CAPSULE | Refills: 1 | Status: SHIPPED | OUTPATIENT
Start: 2025-03-05 | End: 2025-09-01

## 2025-03-05 RX ORDER — ROSUVASTATIN CALCIUM 40 MG/1
40 TABLET, COATED ORAL NIGHTLY
Qty: 90 TABLET | Refills: 2 | Status: SHIPPED | OUTPATIENT
Start: 2025-03-05

## 2025-03-05 RX ORDER — METOPROLOL SUCCINATE 25 MG/1
TABLET, EXTENDED RELEASE ORAL
Qty: 90 TABLET | Refills: 2 | Status: SHIPPED | OUTPATIENT
Start: 2025-03-05

## 2025-03-05 RX ORDER — BUPROPION HYDROCHLORIDE 150 MG/1
150 TABLET ORAL EVERY MORNING
Qty: 90 TABLET | Refills: 2 | Status: SHIPPED | OUTPATIENT
Start: 2025-03-05

## 2025-03-05 SDOH — ECONOMIC STABILITY: FOOD INSECURITY: WITHIN THE PAST 12 MONTHS, THE FOOD YOU BOUGHT JUST DIDN'T LAST AND YOU DIDN'T HAVE MONEY TO GET MORE.: NEVER TRUE

## 2025-03-05 SDOH — ECONOMIC STABILITY: FOOD INSECURITY: WITHIN THE PAST 12 MONTHS, YOU WORRIED THAT YOUR FOOD WOULD RUN OUT BEFORE YOU GOT MONEY TO BUY MORE.: NEVER TRUE

## 2025-03-05 ASSESSMENT — PATIENT HEALTH QUESTIONNAIRE - PHQ9
8. MOVING OR SPEAKING SO SLOWLY THAT OTHER PEOPLE COULD HAVE NOTICED. OR THE OPPOSITE, BEING SO FIGETY OR RESTLESS THAT YOU HAVE BEEN MOVING AROUND A LOT MORE THAN USUAL: NOT AT ALL
SUM OF ALL RESPONSES TO PHQ QUESTIONS 1-9: 4
3. TROUBLE FALLING OR STAYING ASLEEP: NOT AT ALL
1. LITTLE INTEREST OR PLEASURE IN DOING THINGS: MORE THAN HALF THE DAYS
SUM OF ALL RESPONSES TO PHQ QUESTIONS 1-9: 4
SUM OF ALL RESPONSES TO PHQ QUESTIONS 1-9: 4
4. FEELING TIRED OR HAVING LITTLE ENERGY: NOT AT ALL
5. POOR APPETITE OR OVEREATING: NOT AT ALL
SUM OF ALL RESPONSES TO PHQ QUESTIONS 1-9: 4
10. IF YOU CHECKED OFF ANY PROBLEMS, HOW DIFFICULT HAVE THESE PROBLEMS MADE IT FOR YOU TO DO YOUR WORK, TAKE CARE OF THINGS AT HOME, OR GET ALONG WITH OTHER PEOPLE: NOT DIFFICULT AT ALL
7. TROUBLE CONCENTRATING ON THINGS, SUCH AS READING THE NEWSPAPER OR WATCHING TELEVISION: NOT AT ALL
9. THOUGHTS THAT YOU WOULD BE BETTER OFF DEAD, OR OF HURTING YOURSELF: NOT AT ALL
6. FEELING BAD ABOUT YOURSELF - OR THAT YOU ARE A FAILURE OR HAVE LET YOURSELF OR YOUR FAMILY DOWN: NOT AT ALL
2. FEELING DOWN, DEPRESSED OR HOPELESS: MORE THAN HALF THE DAYS

## 2025-03-05 NOTE — PROGRESS NOTES
Medicare Annual Wellness Visit    Dejan Patino is here for Medicare AWV (AWV.)    Assessment & Plan   Medicare annual wellness visit, subsequent  Type 2 diabetes mellitus with diabetic peripheral angiopathy without gangrene, with long-term current use of insulin (HCC)  -     POCT glycosylated hemoglobin (Hb A1C)  -     Albumin/Creatinine Ratio, Urine  -  NEW   Semaglutide,0.25 or 0.5MG/DOS, 2 MG/3ML SOPN; Inject 0.25 mg into the skin every 7 days, Disp-3 mL, R-0Phone In  -     CBC  -     Comprehensive Metabolic Panel  -     Lipid Panel  -     gabapentin (NEURONTIN) 300 MG capsule; Take 1 capsule by mouth in the morning, at noon, and at bedtime for 180 days., Disp-270 capsule, R-1Normal  Not controlled on present regimen, above medications, continue to monitor with labs, back in 3 mos.   Colon cancer screening - declined  Type 2 diabetes mellitus with diabetic polyneuropathy, with long-term current use of insulin (HCC)  -  REFILL   empagliflozin (JARDIANCE) 25 MG tablet; TAKE ONE TABLET BY MOUTH ONCE DAILY. THIS MEDICATION CAN CAUSE DEHYDRATION IF BLOOD SUGARS ARE NOT CONTROLLED., Disp-90 tablet, R-2Normal  -  REFILL   gabapentin (NEURONTIN) 300 MG capsule; Take 1 capsule by mouth in the morning, at noon, and at bedtime for 180 days., Disp-270 capsule, R-1Normal  Neuropathy controlled on present regimen, DM as above, continue same medications, continue to monitor with labs, back in 6 mos.   Moderate recurrent major depression (HCC)  -     buPROPion (WELLBUTRIN XL) 150 MG extended release tablet; Take 1 tablet by mouth every morning, Disp-90 tablet, R-2Normal  -     sertraline (ZOLOFT) 50 MG tablet; TAKE ONE TABLET BY MOUTH DAILY, Disp-90 tablet, R-2Normal  Controlled on present regimen, continue same medications, continue to monitor with labs, back in 6 mos.   RAQUEL (generalized anxiety disorder)  -     busPIRone (BUSPAR) 15 MG tablet; TAKE ONE TABLET BY MOUTH TWO TIMES A DAY, Disp-180 tablet, R-2Normal  History of

## 2025-03-21 DIAGNOSIS — E11.42 TYPE 2 DIABETES MELLITUS WITH DIABETIC POLYNEUROPATHY, WITH LONG-TERM CURRENT USE OF INSULIN (HCC): ICD-10-CM

## 2025-03-21 DIAGNOSIS — M48.02 CERVICAL STENOSIS OF SPINE: ICD-10-CM

## 2025-03-21 DIAGNOSIS — Z79.4 TYPE 2 DIABETES MELLITUS WITH DIABETIC POLYNEUROPATHY, WITH LONG-TERM CURRENT USE OF INSULIN (HCC): ICD-10-CM

## 2025-03-21 NOTE — TELEPHONE ENCOUNTER
Name of Medication(s) Requested:  Requested Prescriptions     Pending Prescriptions Disp Refills    Continuous Glucose Transmitter (DEXCOM G6 TRANSMITTER) MISC 1 each 3     Sig: Change every 90 days    Continuous Glucose Sensor (DEXCOM G6 SENSOR) MISC 9 each 3     Sig: Change every 10 days    oxyCODONE-acetaminophen (PERCOCET) 7.5-325 MG per tablet 60 tablet 0     Sig: Take 1 tablet by mouth 2 times daily as needed for Pain for up to 30 days.       Medication is on current medication list Yes    Dosage and directions were verified? Yes    Quantity verified: 30 day supply     Pharmacy Verified?  Yes    Last Appointment:  3/5/2025    Future appts:  Future Appointments   Date Time Provider Department Center   6/5/2025  3:30 PM Roger Smith MD [facility] DEP        (If no appt send self scheduling link. .REFILLAPPT)  Scheduling request sent?     [] Yes  [] No    Does patient need updated?  [] Yes  [] No

## 2025-03-25 RX ORDER — PROCHLORPERAZINE 25 MG/1
SUPPOSITORY RECTAL
Qty: 9 EACH | Refills: 3 | Status: SHIPPED | OUTPATIENT
Start: 2025-03-25

## 2025-03-25 RX ORDER — OXYCODONE AND ACETAMINOPHEN 7.5; 325 MG/1; MG/1
1 TABLET ORAL 2 TIMES DAILY PRN
Qty: 60 TABLET | Refills: 0 | Status: SHIPPED | OUTPATIENT
Start: 2025-03-25 | End: 2025-04-24

## 2025-03-25 RX ORDER — PROCHLORPERAZINE 25 MG/1
SUPPOSITORY RECTAL
Qty: 1 EACH | Refills: 3 | Status: SHIPPED | OUTPATIENT
Start: 2025-03-25

## 2025-04-22 DIAGNOSIS — M48.02 CERVICAL STENOSIS OF SPINE: ICD-10-CM

## 2025-04-22 RX ORDER — OXYCODONE AND ACETAMINOPHEN 7.5; 325 MG/1; MG/1
1 TABLET ORAL 2 TIMES DAILY PRN
Qty: 60 TABLET | Refills: 0 | Status: SHIPPED | OUTPATIENT
Start: 2025-04-22 | End: 2025-05-22

## 2025-04-22 NOTE — TELEPHONE ENCOUNTER
Name of Medication(s) Requested:  Requested Prescriptions     Pending Prescriptions Disp Refills    oxyCODONE-acetaminophen (PERCOCET) 7.5-325 MG per tablet 60 tablet 0     Sig: Take 1 tablet by mouth 2 times daily as needed for Pain for up to 30 days.       Medication is on current medication list Yes    Dosage and directions were verified? Yes    Quantity verified: 30 day supply     Pharmacy Verified?  Yes    Last Appointment:  3/5/2025    Future appts:  Future Appointments   Date Time Provider Department Center   6/5/2025  3:30 PM Roger Smith MD [facility] DEP        (If no appt send self scheduling link. .REFILLAPPT)  Scheduling request sent?     [] Yes  [] No    Does patient need updated?  [] Yes  [] No

## 2025-05-27 DIAGNOSIS — Z79.4 TYPE 2 DIABETES MELLITUS WITH DIABETIC POLYNEUROPATHY, WITH LONG-TERM CURRENT USE OF INSULIN (HCC): ICD-10-CM

## 2025-05-27 DIAGNOSIS — E11.42 TYPE 2 DIABETES MELLITUS WITH DIABETIC POLYNEUROPATHY, WITH LONG-TERM CURRENT USE OF INSULIN (HCC): ICD-10-CM

## 2025-05-27 RX ORDER — INSULIN GLARGINE 100 [IU]/ML
80 INJECTION, SOLUTION SUBCUTANEOUS NIGHTLY
Qty: 8 ADJUSTABLE DOSE PRE-FILLED PEN SYRINGE | Refills: 5 | Status: SHIPPED | OUTPATIENT
Start: 2025-05-27

## 2025-05-28 DIAGNOSIS — M48.02 CERVICAL STENOSIS OF SPINE: ICD-10-CM

## 2025-05-28 NOTE — TELEPHONE ENCOUNTER
Name of Medication(s) Requested:  Requested Prescriptions      No prescriptions requested or ordered in this encounter       Medication is on current medication list Yes    Dosage and directions were verified? Yes    Quantity verified: 30 day supply     Pharmacy Verified?  Yes    Last Appointment:  3/5/2025    Future appts:  Future Appointments   Date Time Provider Department Center   6/5/2025  3:30 PM Roger Smith MD [facility] DEP        (If no appt send self scheduling link. .REFILLAPPT)  Scheduling request sent?     [] Yes  [] No    Does patient need updated?  [] Yes  [] No

## 2025-05-29 RX ORDER — OXYCODONE AND ACETAMINOPHEN 7.5; 325 MG/1; MG/1
1 TABLET ORAL 2 TIMES DAILY PRN
Qty: 60 TABLET | Refills: 0 | Status: SHIPPED | OUTPATIENT
Start: 2025-05-29 | End: 2025-06-28

## 2025-06-05 ENCOUNTER — OFFICE VISIT (OUTPATIENT)
Dept: PRIMARY CARE CLINIC | Age: 55
End: 2025-06-05
Payer: MEDICARE

## 2025-06-05 VITALS
RESPIRATION RATE: 16 BRPM | HEART RATE: 85 BPM | SYSTOLIC BLOOD PRESSURE: 123 MMHG | HEIGHT: 78 IN | OXYGEN SATURATION: 98 % | DIASTOLIC BLOOD PRESSURE: 78 MMHG | TEMPERATURE: 97.2 F | WEIGHT: 270.8 LBS | BODY MASS INDEX: 31.33 KG/M2

## 2025-06-05 DIAGNOSIS — E11.65 POORLY CONTROLLED TYPE 2 DIABETES MELLITUS (HCC): ICD-10-CM

## 2025-06-05 DIAGNOSIS — E11.29 TYPE 2 DIABETES MELLITUS WITH DIABETIC MICROALBUMINURIA, WITH LONG-TERM CURRENT USE OF INSULIN (HCC): ICD-10-CM

## 2025-06-05 DIAGNOSIS — E11.42 POLYNEUROPATHY DUE TO TYPE 2 DIABETES MELLITUS (HCC): ICD-10-CM

## 2025-06-05 DIAGNOSIS — R06.09 DOE (DYSPNEA ON EXERTION): ICD-10-CM

## 2025-06-05 DIAGNOSIS — E11.610 CHARCOT FOOT DUE TO DIABETES MELLITUS (HCC): ICD-10-CM

## 2025-06-05 DIAGNOSIS — Z79.4 TYPE 2 DIABETES MELLITUS WITH DIABETIC MICROALBUMINURIA, WITH LONG-TERM CURRENT USE OF INSULIN (HCC): ICD-10-CM

## 2025-06-05 DIAGNOSIS — I25.118 CORONARY ARTERY DISEASE OF NATIVE HEART WITH STABLE ANGINA PECTORIS, UNSPECIFIED VESSEL OR LESION TYPE: ICD-10-CM

## 2025-06-05 DIAGNOSIS — R80.9 TYPE 2 DIABETES MELLITUS WITH DIABETIC MICROALBUMINURIA, WITH LONG-TERM CURRENT USE OF INSULIN (HCC): ICD-10-CM

## 2025-06-05 DIAGNOSIS — E11.51 TYPE 2 DIABETES MELLITUS WITH DIABETIC PERIPHERAL ANGIOPATHY WITHOUT GANGRENE, WITH LONG-TERM CURRENT USE OF INSULIN (HCC): Primary | ICD-10-CM

## 2025-06-05 DIAGNOSIS — Z79.4 TYPE 2 DIABETES MELLITUS WITH DIABETIC PERIPHERAL ANGIOPATHY WITHOUT GANGRENE, WITH LONG-TERM CURRENT USE OF INSULIN (HCC): Primary | ICD-10-CM

## 2025-06-05 DIAGNOSIS — I50.22 CHRONIC SYSTOLIC CHF (CONGESTIVE HEART FAILURE) (HCC): ICD-10-CM

## 2025-06-05 DIAGNOSIS — Z12.11 COLON CANCER SCREENING: ICD-10-CM

## 2025-06-05 LAB — HBA1C MFR BLD: 8.4 %

## 2025-06-05 PROCEDURE — G8417 CALC BMI ABV UP PARAM F/U: HCPCS | Performed by: FAMILY MEDICINE

## 2025-06-05 PROCEDURE — 3052F HG A1C>EQUAL 8.0%<EQUAL 9.0%: CPT | Performed by: FAMILY MEDICINE

## 2025-06-05 PROCEDURE — 3078F DIAST BP <80 MM HG: CPT | Performed by: FAMILY MEDICINE

## 2025-06-05 PROCEDURE — 83036 HEMOGLOBIN GLYCOSYLATED A1C: CPT | Performed by: FAMILY MEDICINE

## 2025-06-05 PROCEDURE — 4004F PT TOBACCO SCREEN RCVD TLK: CPT | Performed by: FAMILY MEDICINE

## 2025-06-05 PROCEDURE — 99214 OFFICE O/P EST MOD 30 MIN: CPT | Performed by: FAMILY MEDICINE

## 2025-06-05 PROCEDURE — 3017F COLORECTAL CA SCREEN DOC REV: CPT | Performed by: FAMILY MEDICINE

## 2025-06-05 PROCEDURE — 2022F DILAT RTA XM EVC RTNOPTHY: CPT | Performed by: FAMILY MEDICINE

## 2025-06-05 PROCEDURE — 3074F SYST BP LT 130 MM HG: CPT | Performed by: FAMILY MEDICINE

## 2025-06-05 PROCEDURE — G8427 DOCREV CUR MEDS BY ELIG CLIN: HCPCS | Performed by: FAMILY MEDICINE

## 2025-06-05 RX ORDER — REGADENOSON 0.08 MG/ML
0.4 INJECTION, SOLUTION INTRAVENOUS
OUTPATIENT
Start: 2025-06-05

## 2025-06-05 NOTE — PROGRESS NOTES
Positive for shortness of breath. Negative for cough, chest tightness and wheezing.    Cardiovascular:  Positive for chest pain. Negative for palpitations and leg swelling.   Gastrointestinal: Negative.    Endocrine: Negative for polydipsia, polyphagia and polyuria.   Skin:  Negative for color change, pallor and rash.   Neurological: Negative.    Psychiatric/Behavioral:  Positive for dysphoric mood. Negative for agitation, behavioral problems, confusion, decreased concentration, hallucinations, self-injury, sleep disturbance and suicidal ideas. The patient is not nervous/anxious and is not hyperactive.        PHYSICAL EXAM  /78   Pulse 85   Temp 97.2 °F (36.2 °C) (Oral)   Resp 16   Ht 2.007 m (6' 7\")   Wt 122.8 kg (270 lb 12.8 oz)   SpO2 98%   BMI 30.51 kg/m²   Physical Exam  Constitutional:       General: He is not in acute distress.     Appearance: Normal appearance. He is not ill-appearing or diaphoretic.   Cardiovascular:      Rate and Rhythm: Normal rate and regular rhythm.      Heart sounds: Normal heart sounds. No murmur heard.     No friction rub. No gallop.   Pulmonary:      Effort: Pulmonary effort is normal. No respiratory distress.      Breath sounds: Normal breath sounds. No wheezing, rhonchi or rales.   Skin:     General: Skin is warm and dry.      Findings: No erythema.   Neurological:      Mental Status: He is alert.   Psychiatric:         Mood and Affect: Mood is depressed. Mood is not anxious. Affect is not inappropriate.         Speech: Speech normal.         Behavior: Behavior normal. Behavior is cooperative.         Thought Content: Thought content normal. Thought content is not paranoid or delusional. Thought content does not include homicidal or suicidal ideation. Thought content does not include homicidal or suicidal plan.           The ASCVD Risk score ([place] DK, et al., 2019) failed to calculate for the following reasons:    The valid total cholesterol range is 130 to 320

## 2025-06-06 LAB
CREATININE URINE: 30.6 MG/DL (ref 40–278)
MICROALBUMIN/CREAT 24H UR: 14 MG/L (ref 0–20)
MICROALBUMIN/CREAT UR-RTO: 46 MCG/MG CREAT (ref 0–30)

## 2025-06-11 ASSESSMENT — ENCOUNTER SYMPTOMS
SHORTNESS OF BREATH: 1
COUGH: 0
CHEST TIGHTNESS: 0
WHEEZING: 0

## 2025-06-25 ENCOUNTER — TELEPHONE (OUTPATIENT)
Dept: CARDIOLOGY | Age: 55
End: 2025-06-25

## 2025-06-25 NOTE — TELEPHONE ENCOUNTER
Left message on patient's daughters voicemail asking her if she could get in contact with patient regarding upcoming stress test and have him call office for instructions.

## 2025-06-27 ENCOUNTER — TELEPHONE (OUTPATIENT)
Dept: CARDIOLOGY | Age: 55
End: 2025-06-27

## 2025-06-27 DIAGNOSIS — M48.02 CERVICAL STENOSIS OF SPINE: ICD-10-CM

## 2025-06-27 RX ORDER — OXYCODONE AND ACETAMINOPHEN 7.5; 325 MG/1; MG/1
1 TABLET ORAL 2 TIMES DAILY PRN
Qty: 60 TABLET | Refills: 0 | Status: CANCELLED | OUTPATIENT
Start: 2025-06-27 | End: 2025-07-27

## 2025-06-27 NOTE — TELEPHONE ENCOUNTER
No show echo.  LM to reschedule the patient.    Electronically signed by Kayla Prieto on 6/27/2025 at 9:19 AM

## 2025-06-30 DIAGNOSIS — Z95.1 HISTORY OF FOUR VESSEL CORONARY ARTERY BYPASS GRAFT: ICD-10-CM

## 2025-06-30 DIAGNOSIS — M48.02 CERVICAL STENOSIS OF SPINE: ICD-10-CM

## 2025-06-30 RX ORDER — OXYCODONE AND ACETAMINOPHEN 7.5; 325 MG/1; MG/1
1 TABLET ORAL 2 TIMES DAILY PRN
Qty: 60 TABLET | Refills: 0 | Status: SHIPPED | OUTPATIENT
Start: 2025-06-30 | End: 2025-07-30

## 2025-06-30 RX ORDER — METOPROLOL SUCCINATE 25 MG/1
TABLET, EXTENDED RELEASE ORAL
Qty: 90 TABLET | Refills: 2 | Status: SHIPPED | OUTPATIENT
Start: 2025-06-30

## 2025-06-30 NOTE — TELEPHONE ENCOUNTER
Name of Medication(s) Requested:  Requested Prescriptions     Pending Prescriptions Disp Refills    metoprolol succinate (TOPROL XL) 25 MG extended release tablet 90 tablet 2     Sig: TAKE 1 TABLET BY MOUTH ONCE DAILY FOR 90 DAYS       Medication is on current medication list Yes    Dosage and directions were verified? Yes    Quantity verified: 30 day supply     Pharmacy Verified?  Yes    Last Appointment:  6/5/2025    Future appts:  Future Appointments   Date Time Provider Department Center   10/8/2025  3:30 PM Roger Smith MD [facility] DEP        (If no appt send self scheduling link. .REFILLAPPT)  Scheduling request sent?     [] Yes  [] No    Does patient need updated?  [] Yes  [] No

## 2025-07-28 DIAGNOSIS — E11.42 TYPE 2 DIABETES MELLITUS WITH DIABETIC POLYNEUROPATHY, WITH LONG-TERM CURRENT USE OF INSULIN (HCC): ICD-10-CM

## 2025-07-28 DIAGNOSIS — M48.02 CERVICAL STENOSIS OF SPINE: ICD-10-CM

## 2025-07-28 DIAGNOSIS — Z79.4 TYPE 2 DIABETES MELLITUS WITH DIABETIC PERIPHERAL ANGIOPATHY WITHOUT GANGRENE, WITH LONG-TERM CURRENT USE OF INSULIN (HCC): ICD-10-CM

## 2025-07-28 DIAGNOSIS — Z79.4 TYPE 2 DIABETES MELLITUS WITH DIABETIC POLYNEUROPATHY, WITH LONG-TERM CURRENT USE OF INSULIN (HCC): ICD-10-CM

## 2025-07-28 DIAGNOSIS — E11.51 TYPE 2 DIABETES MELLITUS WITH DIABETIC PERIPHERAL ANGIOPATHY WITHOUT GANGRENE, WITH LONG-TERM CURRENT USE OF INSULIN (HCC): ICD-10-CM

## 2025-07-28 NOTE — TELEPHONE ENCOUNTER
Name of Medication(s) Requested:  Requested Prescriptions     Pending Prescriptions Disp Refills    oxyCODONE-acetaminophen (PERCOCET) 7.5-325 MG per tablet 60 tablet 0     Sig: Take 1 tablet by mouth 2 times daily as needed for Pain for up to 30 days.    gabapentin (NEURONTIN) 300 MG capsule 270 capsule 1     Sig: Take 1 capsule by mouth in the morning, at noon, and at bedtime for 180 days.       Medication is on current medication list Yes    Dosage and directions were verified? Yes    Quantity verified: 30 day supply     Pharmacy Verified?  Yes    Last Appointment:  6/5/2025    Future appts:  Future Appointments   Date Time Provider Department Center   10/8/2025  3:30 PM Roger Smith MD [facility] DEP        (If no appt send self scheduling link. .REFILLAPPT)  Scheduling request sent?     [] Yes  [] No    Does patient need updated?  [] Yes  [] No

## 2025-07-29 RX ORDER — OXYCODONE AND ACETAMINOPHEN 7.5; 325 MG/1; MG/1
1 TABLET ORAL 2 TIMES DAILY PRN
Qty: 60 TABLET | Refills: 0 | Status: SHIPPED | OUTPATIENT
Start: 2025-07-29 | End: 2025-08-28

## 2025-07-29 RX ORDER — GABAPENTIN 300 MG/1
300 CAPSULE ORAL 3 TIMES DAILY
Qty: 270 CAPSULE | Refills: 1 | OUTPATIENT
Start: 2025-07-29 | End: 2026-01-25

## 2025-08-27 DIAGNOSIS — M48.02 CERVICAL STENOSIS OF SPINE: ICD-10-CM

## 2025-08-27 RX ORDER — OXYCODONE AND ACETAMINOPHEN 7.5; 325 MG/1; MG/1
1 TABLET ORAL 2 TIMES DAILY PRN
Qty: 60 TABLET | Refills: 0 | Status: SHIPPED | OUTPATIENT
Start: 2025-08-27 | End: 2025-09-26

## (undated) DEVICE — SOLUTION IRRIG 3000ML 0.9% SOD CHL USP UROMATIC PLAS CONT

## (undated) DEVICE — HANDPIECE SET WITH BONE CLEANING TIP AND SUCTION TUBE: Brand: INTERPULSE

## (undated) DEVICE — TRAP,MUCUS SPECIMEN,40CC: Brand: MEDLINE

## (undated) DEVICE — CONTAINER SPEC ANAERB VACTNR

## (undated) DEVICE — BANDAGE,GAUZE,4.5"X4.1YD,STERILE,LF: Brand: MEDLINE

## (undated) DEVICE — GLOVE ORTHO 7   MSG9470

## (undated) DEVICE — SAGITTAL CMD II (14.0 X 0.38 X 25.5MM)

## (undated) DEVICE — SWAB SPEC COLL SHFT L5.25IN POLYUR FOAM TIP SFT DBL MEDIA

## (undated) DEVICE — LOWER EXT KNEE DRAPE: Brand: MEDLINE INDUSTRIES, INC.